# Patient Record
Sex: MALE | Race: BLACK OR AFRICAN AMERICAN | NOT HISPANIC OR LATINO | Employment: UNEMPLOYED | URBAN - METROPOLITAN AREA
[De-identification: names, ages, dates, MRNs, and addresses within clinical notes are randomized per-mention and may not be internally consistent; named-entity substitution may affect disease eponyms.]

---

## 2017-02-23 ENCOUNTER — ANTICOAGULATION MONITORING (OUTPATIENT)
Dept: VASCULAR LAB | Facility: MEDICAL CENTER | Age: 32
End: 2017-02-23

## 2017-02-23 DIAGNOSIS — I27.82 CHRONIC SEPTIC PULMONARY EMBOLISM WITH ACUTE COR PULMONALE (HCC): ICD-10-CM

## 2017-02-23 DIAGNOSIS — I26.01 CHRONIC SEPTIC PULMONARY EMBOLISM WITH ACUTE COR PULMONALE (HCC): ICD-10-CM

## 2017-02-23 NOTE — PROGRESS NOTES
Discharged from [facility].    Pt is managed by the correctional facility.  Consuelo More, PHARMD

## 2018-01-04 ENCOUNTER — APPOINTMENT (OUTPATIENT)
Dept: RADIOLOGY | Facility: MEDICAL CENTER | Age: 33
End: 2018-01-04
Attending: EMERGENCY MEDICINE
Payer: MEDICAID

## 2018-01-04 ENCOUNTER — HOSPITAL ENCOUNTER (EMERGENCY)
Facility: MEDICAL CENTER | Age: 33
End: 2018-01-04
Attending: EMERGENCY MEDICINE
Payer: MEDICAID

## 2018-01-04 VITALS
SYSTOLIC BLOOD PRESSURE: 121 MMHG | DIASTOLIC BLOOD PRESSURE: 92 MMHG | BODY MASS INDEX: 25.18 KG/M2 | HEART RATE: 82 BPM | WEIGHT: 190 LBS | TEMPERATURE: 98.4 F | HEIGHT: 73 IN | OXYGEN SATURATION: 94 % | RESPIRATION RATE: 18 BRPM

## 2018-01-04 DIAGNOSIS — R55 SYNCOPE, UNSPECIFIED SYNCOPE TYPE: ICD-10-CM

## 2018-01-04 DIAGNOSIS — S09.90XA CLOSED HEAD INJURY, INITIAL ENCOUNTER: ICD-10-CM

## 2018-01-04 DIAGNOSIS — F10.920 ALCOHOLIC INTOXICATION WITHOUT COMPLICATION (HCC): ICD-10-CM

## 2018-01-04 DIAGNOSIS — K08.89 PAIN, DENTAL: ICD-10-CM

## 2018-01-04 DIAGNOSIS — S16.1XXA STRAIN OF NECK MUSCLE, INITIAL ENCOUNTER: ICD-10-CM

## 2018-01-04 LAB
ALBUMIN SERPL BCP-MCNC: 4.5 G/DL (ref 3.2–4.9)
ALBUMIN/GLOB SERPL: 1 G/DL
ALP SERPL-CCNC: 71 U/L (ref 30–99)
ALT SERPL-CCNC: 11 U/L (ref 2–50)
ANION GAP SERPL CALC-SCNC: 12 MMOL/L (ref 0–11.9)
APTT PPP: 24.6 SEC (ref 24.7–36)
AST SERPL-CCNC: 29 U/L (ref 12–45)
BASOPHILS # BLD AUTO: 0 % (ref 0–1.8)
BASOPHILS # BLD: 0 K/UL (ref 0–0.12)
BILIRUB SERPL-MCNC: 0.1 MG/DL (ref 0.1–1.5)
BNP SERPL-MCNC: 21 PG/ML (ref 0–100)
BUN SERPL-MCNC: 11 MG/DL (ref 8–22)
CALCIUM SERPL-MCNC: 9.4 MG/DL (ref 8.4–10.2)
CHLORIDE SERPL-SCNC: 104 MMOL/L (ref 96–112)
CO2 SERPL-SCNC: 22 MMOL/L (ref 20–33)
CREAT SERPL-MCNC: 0.7 MG/DL (ref 0.5–1.4)
EKG IMPRESSION: NORMAL
EOSINOPHIL # BLD AUTO: 0 K/UL (ref 0–0.51)
EOSINOPHIL NFR BLD: 0 % (ref 0–6.9)
ERYTHROCYTE [DISTWIDTH] IN BLOOD BY AUTOMATED COUNT: 41 FL (ref 35.9–50)
ETHANOL BLD-MCNC: 0.38 G/DL
GFR SERPL CREATININE-BSD FRML MDRD: >60 ML/MIN/1.73 M 2
GLOBULIN SER CALC-MCNC: 4.5 G/DL (ref 1.9–3.5)
GLUCOSE SERPL-MCNC: 88 MG/DL (ref 65–99)
HCT VFR BLD AUTO: 43.6 % (ref 42–52)
HGB BLD-MCNC: 15 G/DL (ref 14–18)
INR PPP: 0.82 (ref 0.87–1.13)
LYMPHOCYTES # BLD AUTO: 6.1 K/UL (ref 1–4.8)
LYMPHOCYTES NFR BLD: 56 % (ref 22–41)
MANUAL DIFF BLD: NORMAL
MCH RBC QN AUTO: 30.7 PG (ref 27–33)
MCHC RBC AUTO-ENTMCNC: 34.4 G/DL (ref 33.7–35.3)
MCV RBC AUTO: 89.3 FL (ref 81.4–97.8)
MONOCYTES # BLD AUTO: 0.87 K/UL (ref 0–0.85)
MONOCYTES NFR BLD AUTO: 8 % (ref 0–13.4)
NEUTROPHILS # BLD AUTO: 3.92 K/UL (ref 1.82–7.42)
NEUTROPHILS NFR BLD: 34 % (ref 44–72)
NEUTS BAND NFR BLD MANUAL: 2 % (ref 0–10)
NRBC # BLD AUTO: 0 K/UL
NRBC BLD-RTO: 0 /100 WBC
PLATELET # BLD AUTO: 311 K/UL (ref 164–446)
PLATELET BLD QL SMEAR: NORMAL
PMV BLD AUTO: 9.8 FL (ref 9–12.9)
POTASSIUM SERPL-SCNC: 3.8 MMOL/L (ref 3.6–5.5)
PROT SERPL-MCNC: 9 G/DL (ref 6–8.2)
PROTHROMBIN TIME: 10.9 SEC (ref 12–14.6)
RBC # BLD AUTO: 4.88 M/UL (ref 4.7–6.1)
RBC BLD AUTO: NORMAL
SODIUM SERPL-SCNC: 138 MMOL/L (ref 135–145)
TROPONIN I SERPL-MCNC: <0.02 NG/ML (ref 0–0.04)
VARIANT LYMPHS BLD QL SMEAR: NORMAL
WBC # BLD AUTO: 10.9 K/UL (ref 4.8–10.8)

## 2018-01-04 PROCEDURE — 93005 ELECTROCARDIOGRAM TRACING: CPT | Performed by: EMERGENCY MEDICINE

## 2018-01-04 PROCEDURE — 99285 EMERGENCY DEPT VISIT HI MDM: CPT

## 2018-01-04 PROCEDURE — 83880 ASSAY OF NATRIURETIC PEPTIDE: CPT

## 2018-01-04 PROCEDURE — 72125 CT NECK SPINE W/O DYE: CPT

## 2018-01-04 PROCEDURE — 700111 HCHG RX REV CODE 636 W/ 250 OVERRIDE (IP): Performed by: EMERGENCY MEDICINE

## 2018-01-04 PROCEDURE — 71045 X-RAY EXAM CHEST 1 VIEW: CPT

## 2018-01-04 PROCEDURE — 70450 CT HEAD/BRAIN W/O DYE: CPT

## 2018-01-04 PROCEDURE — 96361 HYDRATE IV INFUSION ADD-ON: CPT

## 2018-01-04 PROCEDURE — 700105 HCHG RX REV CODE 258: Performed by: EMERGENCY MEDICINE

## 2018-01-04 PROCEDURE — 80307 DRUG TEST PRSMV CHEM ANLYZR: CPT

## 2018-01-04 PROCEDURE — 80053 COMPREHEN METABOLIC PANEL: CPT

## 2018-01-04 PROCEDURE — 85730 THROMBOPLASTIN TIME PARTIAL: CPT

## 2018-01-04 PROCEDURE — 84484 ASSAY OF TROPONIN QUANT: CPT

## 2018-01-04 PROCEDURE — 96374 THER/PROPH/DIAG INJ IV PUSH: CPT

## 2018-01-04 PROCEDURE — 85007 BL SMEAR W/DIFF WBC COUNT: CPT

## 2018-01-04 PROCEDURE — 85610 PROTHROMBIN TIME: CPT

## 2018-01-04 PROCEDURE — 85027 COMPLETE CBC AUTOMATED: CPT

## 2018-01-04 RX ORDER — SODIUM CHLORIDE 9 MG/ML
2000 INJECTION, SOLUTION INTRAVENOUS ONCE
Status: COMPLETED | OUTPATIENT
Start: 2018-01-04 | End: 2018-01-04

## 2018-01-04 RX ORDER — AMOXICILLIN 500 MG/1
500 CAPSULE ORAL 3 TIMES DAILY
Qty: 30 CAP | Refills: 0 | Status: SHIPPED | OUTPATIENT
Start: 2018-01-04 | End: 2018-01-04

## 2018-01-04 RX ORDER — AMOXICILLIN 500 MG/1
500 CAPSULE ORAL 3 TIMES DAILY
Qty: 21 CAP | Refills: 0 | Status: SHIPPED | OUTPATIENT
Start: 2018-01-04 | End: 2018-01-11

## 2018-01-04 RX ORDER — LORAZEPAM 2 MG/ML
2 INJECTION INTRAMUSCULAR ONCE
Status: COMPLETED | OUTPATIENT
Start: 2018-01-04 | End: 2018-01-04

## 2018-01-04 RX ADMIN — SODIUM CHLORIDE 2000 ML: 900 INJECTION, SOLUTION INTRAVENOUS at 18:30

## 2018-01-04 RX ADMIN — LORAZEPAM 2 MG: 2 INJECTION INTRAMUSCULAR; INTRAVENOUS at 18:30

## 2018-01-04 ASSESSMENT — LIFESTYLE VARIABLES
ON A TYPICAL DAY WHEN YOU DRINK ALCOHOL HOW MANY DRINKS DO YOU HAVE: 2
AVERAGE NUMBER OF DAYS PER WEEK YOU HAVE A DRINK CONTAINING ALCOHOL: 1
TOTAL SCORE: 1
EVER HAD A DRINK FIRST THING IN THE MORNING TO STEADY YOUR NERVES TO GET RID OF A HANGOVER: NO
DO YOU DRINK ALCOHOL: YES
TOTAL SCORE: 1
TOTAL SCORE: 1
HAVE PEOPLE ANNOYED YOU BY CRITICIZING YOUR DRINKING: NO
CONSUMPTION TOTAL: POSITIVE
EVER FELT BAD OR GUILTY ABOUT YOUR DRINKING: YES
HOW MANY TIMES IN THE PAST YEAR HAVE YOU HAD 5 OR MORE DRINKS IN A DAY: 50
HAVE YOU EVER FELT YOU SHOULD CUT DOWN ON YOUR DRINKING: NO

## 2018-01-04 ASSESSMENT — PAIN SCALES - GENERAL: PAINLEVEL_OUTOF10: 0

## 2018-01-05 NOTE — ED NOTES
Back from radiology - patient stating he wanted to leave for a cigarette. Security called. Patient calmed. Patient medicated per ERP orders.

## 2018-01-05 NOTE — ED NOTES
"Brought in via [facility]. Patient states he is anemic. Wife states he has had multiple syncopal episodes in the last several days.  Per EMS Patient has drank 2 pints of alcohol.   Initially patient was hollering \"I don't want to be here\". Security to bedside. Patient calmed. Patient now apologetic and cooperative.   "

## 2018-01-05 NOTE — ED PROVIDER NOTES
ED Provider Note    CHIEF COMPLAINT  Chief Complaint   Patient presents with   • Syncope       HPI  Sagrario Berkowitz is a 32 y.o. male with a history of HIV, oriented embolism who presents with his significant other saying he has had several syncopal episodes over the past several days. The patient is to drinking 2 pints of vodka today. His significant other says he only usually drinks once a week. The patient is quite intoxicated and somewhat belligerent and aggressive. He does complain of having head and neck pain from a fall yesterday. He has had no vomiting or diarrhea, denies any current chest pain, back pain, or abdominal pain.    REVIEW OF SYSTEMS  See HPI for further details. All other systems are negative.     PAST MEDICAL HISTORY  Past Medical History:   Diagnosis Date   • Infectious disease     HIV   • Pulmonary embolism (CMS-HCC)        FAMILY HISTORY  Family History   Problem Relation Age of Onset   • Thyroid Mother    • Hypertension Mother    • Hypertension Father        SOCIAL HISTORY  Social History     Social History   • Marital status: Single     Spouse name: N/A   • Number of children: N/A   • Years of education: N/A     Social History Main Topics   • Smoking status: Current Every Day Smoker     Packs/day: 0.50     Types: Cigarettes   • Smokeless tobacco: Never Used      Comment: ppd   • Alcohol use Yes      Comment: vodka   • Drug use: No      Comment: thc daily   • Sexual activity: Not Currently     Partners: Female     Other Topics Concern   • Not on file     Social History Narrative    ** Merged History Encounter **         ** Merged History Encounter **            SURGICAL HISTORY  No past surgical history on file.    CURRENT MEDICATIONS  Home Medications     Reviewed by Destini Kamara R.N. (Registered Nurse) on 01/04/18 at 1724  Med List Status: Complete   Medication Last Dose Status        Patient Alexander Taking any Medications                       ALLERGIES  No Known Allergies    PHYSICAL  "EXAM  VITAL SIGNS: /100   Pulse (!) 115   Temp 36.7 °C (98.1 °F)   Resp 18   Ht 1.854 m (6' 1\")   Wt 86.2 kg (190 lb)   SpO2 98%   BMI 25.07 kg/m²   Constitutional: Asleep, arousable, appears moderately intoxicated, agitated, swearing, verbally abusive to his significant other, Non-toxic appearance  HENT: Atraumatic. Bilateral external ears normal, mucous membranes dry, lips are dry, dentition is poor, there is a severely fractured 3rd molar to the right upper jaw but only about 20% remaining, there is no significant swelling to the gum or buccal mucosa. No swelling to the soft palate or uvula, no trismus, throat nonerythematous without exudates, nose is normal.  Eyes: PERRL, EOMI, conjunctiva moist, mildly injected.  Neck: Nontender, Normal range of motion, No nuchal rigidity, No stridor.   Lymphatic: No lymphadenopathy noted.   Cardiovascular: Regular rate and rhythm, no murmurs, rubs, gallops.  Thorax & Lungs:  Good breath sounds bilaterally, no wheezes, rales, or retractions.  No chest tenderness.  Abdomen: Bowel sounds normal, Soft, nontender, nondistended, no rebound, guarding, masses.  Back: No CVA or spinal tenderness.  Extremities: Intact distal pulses, No edema, No tenderness.   Skin: Warm, Dry, No rashes.   Musculoskeletal: No joint swelling or tenderness.  Neurologic: Alert & oriented x 3, sensory and motor function normal. No focal deficits.   Psychiatric: Affect intoxicated, verbally aggressive, Judgment inpaired due to intoxication.    EKG  Twelve-lead EKG shows a normal sinus rhythm, rate of D5, normal intervals, normal axis, no acute ST-T wave elevations or ST depressions, there is a early repolarization pattern, no pathologic Q waves, no evidence for an acute injury or ischemia pattern on my reading, in comparison to a previous EKG from May, 2016 there are no acute changes.        RADIOLOGY/PROCEDURES  DX-CHEST-PORTABLE (1 VIEW)   Final Result      No evidence of acute cardiopulmonary " process.      CT-CSPINE WITHOUT PLUS RECONS   Final Result      No evidence of cervical spine fracture.      CT-HEAD W/O   Final Result      No evidence of acute intracranial process.            COURSE & MEDICAL DECISION MAKING  Pertinent Labs & Imaging studies reviewed. (See chart for details)  The patient presents with the above complaints. His significant other says he has been drinking because of a toothache. The patient does admit to drinking 2/5 of vodka today. He also was complaining of a headache and neck pain earlier today, and his significant other said he has fallen down several times since yesterday. The patient was quite agitated and swearing at times. Clinically he appears dehydrated with dry mucous membranes and was given a bolus of normal saline. He was given Ativan for his agitation. EKG shows a normal sinus rhythm. Chest x-ray shows no acute cardiopulmonary abnormalities. CT scan of the head was negative for any acute intracranial findings. CT scan of cervical spine negative for fracture.  CBC shows a white count 10,900, with a rightward shift, hemoglobin normal 15.0, chemistry profile normal, alcohol 0.38, troponin less than 0.02, BNP 21, INR 0.82. I suspect the patient's syncope and falling is secondary to his alcohol use.  I spoke with the patient's significant other, and he has no desire to go to a rehab program. The patient will be placed on a course of amoxicillin for his dental pain, with referral to the [facility] clinic to see a dentist as soon as possible.  His lab work is otherwise unremarkable. When the patient is sober and able to ambulate safely he will be discharged home.    FINAL IMPRESSION  1. Acute alcohol intoxication  2. Syncope likely secondary to alcohol intoxication  3. Dental pain         Electronically signed by: Tunde Andrew, 1/4/2018 6:09 PM

## 2018-01-05 NOTE — DISCHARGE INSTRUCTIONS
"Alcohol Intoxication  Alcohol intoxication occurs when the amount of alcohol that a person has consumed impairs his or her ability to mentally and physically function. Alcohol directly impairs the normal chemical activity of the brain. Drinking large amounts of alcohol can lead to changes in mental function and behavior, and it can cause many physical effects that can be harmful.   Alcohol intoxication can range in severity from mild to very severe. Various factors can affect the level of intoxication that occurs, such as the person's age, gender, weight, frequency of alcohol consumption, and the presence of other medical conditions (such as diabetes, seizures, or heart conditions). Dangerous levels of alcohol intoxication may occur when people drink large amounts of alcohol in a short period (binge drinking). Alcohol can also be especially dangerous when combined with certain prescription medicines or \"recreational\" drugs.  SIGNS AND SYMPTOMS  Some common signs and symptoms of mild alcohol intoxication include:  · Loss of coordination.  · Changes in mood and behavior.  · Impaired judgment.  · Slurred speech.  As alcohol intoxication progresses to more severe levels, other signs and symptoms will appear. These may include:  · Vomiting.  · Confusion and impaired memory.  · Slowed breathing.  · Seizures.  · Loss of consciousness.  DIAGNOSIS   Your health care provider will take a medical history and perform a physical exam. You will be asked about the amount and type of alcohol you have consumed. Blood tests will be done to measure the concentration of alcohol in your blood. In many places, your blood alcohol level must be lower than 80 mg/dL (0.08%) to legally drive. However, many dangerous effects of alcohol can occur at much lower levels.   TREATMENT   People with alcohol intoxication often do not require treatment. Most of the effects of alcohol intoxication are temporary, and they go away as the alcohol naturally " leaves the body. Your health care provider will monitor your condition until you are stable enough to go home. Fluids are sometimes given through an IV access tube to help prevent dehydration.   HOME CARE INSTRUCTIONS  · Do not drive after drinking alcohol.  · Stay hydrated. Drink enough water and fluids to keep your urine clear or pale yellow. Avoid caffeine.    · Only take over-the-counter or prescription medicines as directed by your health care provider.    SEEK MEDICAL CARE IF:   · You have persistent vomiting.    · You do not feel better after a few days.  · You have frequent alcohol intoxication. Your health care provider can help determine if you should see a substance use treatment counselor.  SEEK IMMEDIATE MEDICAL CARE IF:   · You become shaky or tremble when you try to stop drinking.    · You shake uncontrollably (seizure).    · You throw up (vomit) blood. This may be bright red or may look like black coffee grounds.    · You have blood in your stool. This may be bright red or may appear as a black, tarry, bad smelling stool.    · You become lightheaded or faint.    MAKE SURE YOU:   · Understand these instructions.  · Will watch your condition.  · Will get help right away if you are not doing well or get worse.     This information is not intended to replace advice given to you by your health care provider. Make sure you discuss any questions you have with your health care provider.     Document Released: 09/27/2006 Document Revised: 08/20/2014 Document Reviewed: 05/23/2014  [employer] Interactive Patient Education ©2016 [employer]        Cervical Sprain  A cervical sprain is an injury in the neck in which the strong, fibrous tissues (ligaments) that connect your neck bones stretch or tear. Cervical sprains can range from mild to severe. Severe cervical sprains can cause the neck vertebrae to be unstable. This can lead to damage of the spinal cord and can result in serious nervous system problems. The  amount of time it takes for a cervical sprain to get better depends on the cause and extent of the injury. Most cervical sprains heal in 1 to 3 weeks.  CAUSES   Severe cervical sprains may be caused by:   · Contact sport injuries (such as from football, rugby, wrestling, hockey, auto racing, gymnastics, diving, martial arts, or boxing).    · Motor vehicle collisions.    · Whiplash injuries. This is an injury from a sudden forward and backward whipping movement of the head and neck.   · Falls.    Mild cervical sprains may be caused by:   · Being in an awkward position, such as while cradling a telephone between your ear and shoulder.    · Sitting in a chair that does not offer proper support.    · Working at a poorly designed computer station.    · Looking up or down for long periods of time.    SYMPTOMS   · Pain, soreness, stiffness, or a burning sensation in the front, back, or sides of the neck. This discomfort may develop immediately after the injury or slowly, 24 hours or more after the injury.    · Pain or tenderness directly in the middle of the back of the neck.    · Shoulder or upper back pain.    · Limited ability to move the neck.    · Headache.    · Dizziness.    · Weakness, numbness, or tingling in the hands or arms.    · Muscle spasms.    · Difficulty swallowing or chewing.    · Tenderness and swelling of the neck.    DIAGNOSIS   Most of the time your health care provider can diagnose a cervical sprain by taking your history and doing a physical exam. Your health care provider will ask about previous neck injuries and any known neck problems, such as arthritis in the neck. X-rays may be taken to find out if there are any other problems, such as with the bones of the neck. Other tests, such as a CT scan or MRI, may also be needed.   TREATMENT   Treatment depends on the severity of the cervical sprain. Mild sprains can be treated with rest, keeping the neck in place (immobilization), and pain medicines.  Severe cervical sprains are immediately immobilized. Further treatment is done to help with pain, muscle spasms, and other symptoms and may include:  · Medicines, such as pain relievers, numbing medicines, or muscle relaxants.    · Physical therapy. This may involve stretching exercises, strengthening exercises, and posture training. Exercises and improved posture can help stabilize the neck, strengthen muscles, and help stop symptoms from returning.    HOME CARE INSTRUCTIONS   · Put ice on the injured area.    ¨ Put ice in a plastic bag.    ¨ Place a towel between your skin and the bag.    ¨ Leave the ice on for 15-20 minutes, 3-4 times a day.    · If your injury was severe, you may have been given a cervical collar to wear. A cervical collar is a two-piece collar designed to keep your neck from moving while it heals.  ¨ Do not remove the collar unless instructed by your health care provider.  ¨ If you have long hair, keep it outside of the collar.  ¨ Ask your health care provider before making any adjustments to your collar. Minor adjustments may be required over time to improve comfort and reduce pressure on your chin or on the back of your head.  ¨ If you are allowed to remove the collar for cleaning or bathing, follow your health care provider's instructions on how to do so safely.  ¨ Keep your collar clean by wiping it with mild soap and water and drying it completely. If the collar you have been given includes removable pads, remove them every 1-2 days and hand wash them with soap and water. Allow them to air dry. They should be completely dry before you wear them in the collar.  ¨ If you are allowed to remove the collar for cleaning and bathing, wash and dry the skin of your neck. Check your skin for irritation or sores. If you see any, tell your health care provider.  ¨ Do not drive while wearing the collar.    · Only take over-the-counter or prescription medicines for pain, discomfort, or fever as directed  by your health care provider.    · Keep all follow-up appointments as directed by your health care provider.    · Keep all physical therapy appointments as directed by your health care provider.    · Make any needed adjustments to your workstation to promote good posture.    · Avoid positions and activities that make your symptoms worse.    · Warm up and stretch before being active to help prevent problems.    SEEK MEDICAL CARE IF:   · Your pain is not controlled with medicine.    · You are unable to decrease your pain medicine over time as planned.    · Your activity level is not improving as expected.    SEEK IMMEDIATE MEDICAL CARE IF:   · You develop any bleeding.  · You develop stomach upset.  · You have signs of an allergic reaction to your medicine.    · Your symptoms get worse.    · You develop new, unexplained symptoms.    · You have numbness, tingling, weakness, or paralysis in any part of your body.    MAKE SURE YOU:   · Understand these instructions.  · Will watch your condition.  · Will get help right away if you are not doing well or get worse.     This information is not intended to replace advice given to you by your health care provider. Make sure you discuss any questions you have with your health care provider.     Document Released: 10/14/2008 Document Revised: 12/23/2014 Document Reviewed: 06/25/2014  [employer] Interactive Patient Education ©2016 [employer]  Dental Pain  Dental pain may be caused by many things, including:  · Tooth decay (cavities or caries). Cavities expose the nerve of your tooth to air and hot or cold temperatures. This can cause pain or discomfort.  · Abscess or infection. A dental abscess is a collection of infected pus from a bacterial infection in the inner part of the tooth (pulp). It usually occurs at the end of the tooth's root.  · Injury.  · An unknown reason (idiopathic).  Your pain may be mild or severe. It may only occur when:  · You are chewing.  · You are  exposed to hot or cold temperature.  · You are eating or drinking sugary foods or beverages, such as soda or candy.  Your pain may also be constant.  HOME CARE INSTRUCTIONS  Watch your dental pain for any changes. The following actions may help to lessen any discomfort that you are feeling:  · Take medicines only as directed by your dentist.  · If you were prescribed an antibiotic medicine, finish all of it even if you start to feel better.  · Keep all follow-up visits as directed by your dentist. This is important.  · Do not apply heat to the outside of your face.  · Rinse your mouth or gargle with salt water if directed by your dentist. This helps with pain and swelling.  ¨ You can make salt water by adding ¼ tsp of salt to 1 cup of warm water.  · Apply ice to the painful area of your face:  ¨ Put ice in a plastic bag.  ¨ Place a towel between your skin and the bag.  ¨ Leave the ice on for 20 minutes, 2-3 times per day.  · Avoid foods or drinks that cause you pain, such as:  ¨ Very hot or very cold foods or drinks.  ¨ Sweet or sugary foods or drinks.  SEEK MEDICAL CARE IF:  · Your pain is not controlled with medicines.  · Your symptoms are worse.  · You have new symptoms.  SEEK IMMEDIATE MEDICAL CARE IF:  · You are unable to open your mouth.  · You are having trouble breathing or swallowing.  · You have a fever.  · Your face, neck, or jaw is swollen.     This information is not intended to replace advice given to you by your health care provider. Make sure you discuss any questions you have with your health care provider.     Document Released: 12/18/2006 Document Revised: 05/03/2016 Document Reviewed: 12/14/2015  [employer] Interactive Patient Education ©2016 [employer]

## 2018-01-05 NOTE — ED NOTES
Patient educated to stay in bed.   Lab called to check on why blood drawn on arrival is not in process.

## 2018-01-05 NOTE — ED NOTES
Discharge instructions provided.  Pt verbalized the understanding of discharge instructions to follow up with Dentist, PCP and to return to ER if condition worsens.  Pt ambulated out of ER without difficulty with significant other.

## 2018-02-23 ENCOUNTER — HOSPITAL ENCOUNTER (EMERGENCY)
Facility: MEDICAL CENTER | Age: 33
End: 2018-02-23
Attending: EMERGENCY MEDICINE
Payer: MEDICAID

## 2018-02-23 VITALS
RESPIRATION RATE: 16 BRPM | HEART RATE: 95 BPM | SYSTOLIC BLOOD PRESSURE: 139 MMHG | WEIGHT: 175 LBS | HEIGHT: 74 IN | BODY MASS INDEX: 22.46 KG/M2 | DIASTOLIC BLOOD PRESSURE: 97 MMHG | TEMPERATURE: 98.4 F

## 2018-02-23 PROCEDURE — 302449 STATCHG TRIAGE ONLY (STATISTIC)

## 2018-02-23 ASSESSMENT — LIFESTYLE VARIABLES: DO YOU DRINK ALCOHOL: NO

## 2018-02-24 NOTE — ED NOTES
PT UNABLE TO FOCUS ON ANY COMPLAINT, RAMBLING ABOUT HIS FAMILY AND US RESPECTING HIM, PT REFUSING TO STAY, SAYING WE'RE NOT RESPECTING HIM.  AMB WITH STEADY GAIT OUT TO WR WITH SECURITY OBSERVATION.  LEFT BEFORE MD SEYMOUR.

## 2018-02-24 NOTE — ED PROVIDER NOTES
"ED Provider Note    Scribed for Guido Browning M.D. by Waqar Silva. 2/23/2018, 6:51 PM.    Primary care provider: None  Means of arrival: walk-in    CHIEF COMPLAINT  Chief Complaint   Patient presents with   • Syncope     PT BROUGHT IN BY EMS, JOVANNY, C/O \"PASSING OUT 7 TIMES TODAY\", AND THAT HIS TOOTH HURTS, APPEARS EITHER MANIC OR INTOXICATED       HPI    Patient eloped from the ED and was arrested prior to my evaluation.     "

## 2019-12-25 ENCOUNTER — HOSPITAL ENCOUNTER (EMERGENCY)
Facility: MEDICAL CENTER | Age: 34
End: 2019-12-25
Attending: EMERGENCY MEDICINE
Payer: MEDICAID

## 2019-12-25 VITALS
SYSTOLIC BLOOD PRESSURE: 139 MMHG | WEIGHT: 182.1 LBS | RESPIRATION RATE: 16 BRPM | DIASTOLIC BLOOD PRESSURE: 103 MMHG | BODY MASS INDEX: 24.66 KG/M2 | OXYGEN SATURATION: 98 % | HEIGHT: 72 IN | HEART RATE: 99 BPM | TEMPERATURE: 98.1 F

## 2019-12-25 DIAGNOSIS — H10.33 ACUTE CONJUNCTIVITIS OF BOTH EYES, UNSPECIFIED ACUTE CONJUNCTIVITIS TYPE: ICD-10-CM

## 2019-12-25 LAB
GRAM STN SPEC: NORMAL
SIGNIFICANT IND 70042: NORMAL
SITE SITE: NORMAL
SOURCE SOURCE: NORMAL
SPECIMEN SOURCE: NORMAL

## 2019-12-25 PROCEDURE — 87186 SC STD MICRODIL/AGAR DIL: CPT

## 2019-12-25 PROCEDURE — 87077 CULTURE AEROBIC IDENTIFY: CPT

## 2019-12-25 PROCEDURE — 87491 CHLMYD TRACH DNA AMP PROBE: CPT

## 2019-12-25 PROCEDURE — 87070 CULTURE OTHR SPECIMN AEROBIC: CPT

## 2019-12-25 PROCEDURE — 99283 EMERGENCY DEPT VISIT LOW MDM: CPT

## 2019-12-25 PROCEDURE — 87591 N.GONORRHOEAE DNA AMP PROB: CPT

## 2019-12-25 PROCEDURE — 87205 SMEAR GRAM STAIN: CPT

## 2019-12-25 RX ORDER — TOBRAMYCIN 3 MG/ML
1 SOLUTION/ DROPS OPHTHALMIC EVERY 4 HOURS
Qty: 1 BOTTLE | Refills: 0 | Status: ON HOLD | OUTPATIENT
Start: 2019-12-25 | End: 2019-12-30

## 2019-12-25 NOTE — LETTER
12/28/2019               Sagrario Berkowitz  [address]  [facility] [address]        Dear Sagrario (MR#[id number])    This letter is sent in regards to your, recent visit to the [facility] Emergency Department on 12/25/2019.  During the visit, tests were performed to assist the physician in a medical diagnosis.  A review of those tests requires that we notify you of the following:    Your wound culture was POSITIVE for a bacteria called Methicillin Sensitive Staphylococcus aureus and Neisseria gonorrhoeae. The antibiotic prescribed for you (tobramycin eye drops) should be active to treat methicillin sensitive Staphlococcus aureus, but not Neisseria gonorrhoeae IT IS IMPORTANT THAT YOU CONTINUE ADMINISTERING THE EYE DROPS UNTIL IT IS FINISHED, AND RETURN TO THE ED, OR FOLLOW UP WITH YOUR PRIMARY CARE PROVIDER OR [facility] DEPARTMENT ([phone]) TO RECEIVE TREATMENT FOR GONORRHEA. It is recommended you avoid sexual activity for 7 days after treatment, notify partners within the past 60 days to seek testing, and obtain testing for additional sexually transmitted diseases from the [facility].       Please feel free to contact me at the number below if you have any questions or concerns. Thank you for your cooperation in the matter.    Sincerely,  ED Culture Follow-Up Staff  Kelli Christy, PharmD    [facility], Emergency Department  [address], [place] [id number] [phone] (ED Culture Line)  [phone]

## 2019-12-26 NOTE — DISCHARGE INSTRUCTIONS
"Conjunctivitis  Conjunctivitis is commonly called \"pink eye.\" Conjunctivitis can be caused by bacterial or viral infection, allergies, or injuries. There is usually redness of the lining of the eye, itching, discomfort, and sometimes discharge. There may be deposits of matter along the eyelids. A viral infection usually causes a watery discharge, while a bacterial infection causes a yellowish, thick discharge. Pink eye is very contagious and spreads by direct contact.  You may be given antibiotic eyedrops as part of your treatment. Before using your eye medicine, remove all drainage from the eye by washing gently with warm water and cotton balls. Continue to use the medication until you have awakened 2 mornings in a row without discharge from the eye. Do not rub your eye. This increases the irritation and helps spread infection. Use separate towels from other household members. Wash your hands with soap and water before and after touching your eyes. Use cold compresses to reduce pain and sunglasses to relieve irritation from light. Do not wear contact lenses or wear eye makeup until the infection is gone.  SEEK MEDICAL CARE IF:   · Your symptoms are not better after 3 days of treatment.  · You have increased pain or trouble seeing.  · The outer eyelids become very red or swollen.  Document Released: 01/25/2006 Document Revised: 03/11/2013 Document Reviewed: 12/18/2006  [employer]® Patient Information ©2014 [employer].    "

## 2019-12-26 NOTE — ED PROVIDER NOTES
ED Provider Note    CHIEF COMPLAINT  Chief Complaint   Patient presents with   • Eye Drainage     LT eye since yesterday. large amount of discharge, periorbital swelling observed.        HPI  Sagrario Berkowitz is a 34 y.o. male with a history of HIV currently noncompliant with medications, who presents with eye drainage from both eyes left greater than right.  He reports that the symptoms started yesterday but got worse today.  The patient denies fever, he denies cough.  He denies any other infectious symptoms.    REVIEW OF SYSTEMS  See HPI for further details. All other systems are negative.     PAST MEDICAL HISTORY   has a past medical history of Infectious disease and Pulmonary embolism (HCC).    SOCIAL HISTORY  Social History     Tobacco Use   • Smoking status: Current Every Day Smoker     Packs/day: 0.50     Types: Cigarettes   • Smokeless tobacco: Never Used   • Tobacco comment: ppd   Substance and Sexual Activity   • Alcohol use: Yes     Comment: vodka    • Drug use: No     Types: Inhaled     Comment: thc daily   • Sexual activity: Not Currently     Partners: Female       SURGICAL HISTORY  patient denies any surgical history    CURRENT MEDICATIONS  Home Medications     Reviewed by Carson Flores R.N. (Registered Nurse) on 12/25/19 at 1654  Med List Status: Partial   Medication Last Dose Status        Patient Alexander Taking any Medications                       ALLERGIES  No Known Allergies    PHYSICAL EXAM  VITAL SIGNS: /103   Pulse 99   Temp 36.7 °C (98.1 °F) (Temporal)   Resp 16   Ht 1.829 m (6')   Wt 82.6 kg (182 lb 1.6 oz)   SpO2 98%   BMI 24.70 kg/m²  @DIONTE[[id number]::@   Pulse ox interpretation: I interpret this pulse ox as normal.  Constitutional: Alert.  HENT: No signs of trauma, Bilateral external ears normal, Nose normal.   Eyes: Pupils are equal and reactive, the patient has erythematous conjunctiva bilaterally, he has pus with crusting of the left eye, he has chemosis of the left eye.  The  right eye has the same symptoms to a lesser degree.  Neck: Normal range of motion, No tenderness, Supple, No stridor.   Lymphatic: No lymphadenopathy noted.   Cardiovascular: Regular rate and rhythm, no murmurs.   Thorax & Lungs: Normal breath sounds, No respiratory distress, No wheezing, No chest tenderness.   Abdomen: Bowel sounds normal, Soft, No tenderness, No masses, No pulsatile masses. No peritoneal signs.  Skin: Warm, Dry, No erythema, No rash.   Back: No bony tenderness, No CVA tenderness.   Extremities: Intact distal pulses, No edema, No tenderness, No cyanosis.  Musculoskeletal: Good range of motion in all major joints. No tenderness to palpation or major deformities noted.   Neurologic: Alert , Normal motor function, Normal sensory function, No focal deficits noted.   Psychiatric: Affect normal, Judgment normal, Mood normal.       DIAGNOSTIC STUDIES / PROCEDURES      LABS  Labs Reviewed   CHLAMYDIA/GC PCR URINE OR SWAB    Narrative:     Left eye conjuctivitis   CULTURE WOUND W/ GRAM STAIN             COURSE & MEDICAL DECISION MAKING  Pertinent Labs & Imaging studies reviewed. (See chart for details)    The patient had visual acuity, 20/30 left, 20/20 right, 20/20 both.    The pus from the eye was cultured for general culture as well as gonorrhea and chlamydia.  The patient is treated with tobramycin eyedrops.  He will return to the ER if no better in 1 to 2 days or at any time if worse.  He is asked to follow-up with the [facility] clinic to have his viral load tested and to establish a primary care doctor.  He is asked to return for any cough or any signs or symptoms of infection.    The patient will return for new or worsening symptoms and is stable at the time of discharge.    The patient is referred to a primary physician for blood pressure management, diabetic screening, and for all other preventative health concerns.        DISPOSITION:  Patient will be discharged home in stable condition.    FOLLOW  UP:  [facility], Emergency Dept  [address] [address]  [phone]    If symptoms worsen    [facility][1] [place]  [address] [address]  [phone]    Call for appointment to have your viral load tested and to establish a primary care doctor      OUTPATIENT MEDICATIONS:  New Prescriptions    TOBRAMYCIN (TOBREX) 0.3 % SOLUTION OPHTHALMIC SOLUTION    Place 1 Drop in both eyes every 4 hours for 7 days.       The patient will return for worsening symptoms and is stable at the time of discharge. The patient verbalizes understanding and will comply.    FINAL IMPRESSION  1. Acute conjunctivitis of both eyes, unspecified acute conjunctivitis type                Electronically signed by: Gordon Romano, 12/25/2019 5:37 PM

## 2019-12-26 NOTE — ED TRIAGE NOTES
Chief Complaint   Patient presents with   • Eye Drainage     LT eye since yesterday. large amount of discharge, periorbital swelling observed.      Pt to triage for above. Vision blurred from drainage.     /103   Pulse 99   Temp 36.7 °C (98.1 °F) (Temporal)   Resp 16   Ht 1.829 m (6')   Wt 82.6 kg (182 lb 1.6 oz)   SpO2 98%   BMI 24.70 kg/m²

## 2019-12-27 LAB — TEST NAME 95000: ABNORMAL

## 2019-12-28 LAB
BACTERIA WND AEROBE CULT: ABNORMAL
BACTERIA WND AEROBE CULT: ABNORMAL
GRAM STN SPEC: ABNORMAL
SIGNIFICANT IND 70042: ABNORMAL
SITE SITE: ABNORMAL
SOURCE SOURCE: ABNORMAL

## 2019-12-28 NOTE — ED NOTES
ED Positive Culture Follow-up/Notification Note:    Date: 12/28/2019     Patient seen in the ED on 12/25/2019 for L eye drainage w/periorbital swelling. H/o HIV noncompliant with medication. Bilateral erythematous conjunctiva and pus with crusting and chemosis of left eye, less in R eye.  1. Acute conjunctivitis of both eyes, unspecified acute conjunctivitis type       Discharge Medication List as of 12/25/2019  5:50 PM      START taking these medications    Details   tobramycin (TOBREX) 0.3 % Solution ophthalmic solution Place 1 Drop in both eyes every 4 hours for 7 days., Disp-1 Bottle, R-0, Print Rx Paper             Allergies: Patient has no known allergies.     Vitals:    12/25/19 1651 12/25/19 1652   BP: 139/103    Pulse: 99    Resp: 16    Temp: 36.7 °C (98.1 °F)    TempSrc: Temporal    SpO2: 98%    Weight:  82.6 kg (182 lb 1.6 oz)   Height: 1.829 m (6')        Final cultures:   Results     Procedure Component Value Units Date/Time    CULTURE WOUND W/ GRAM STAIN [[id number]]  (Abnormal)  (Susceptibility) Collected:  12/25/19 2020    Order Status:  Completed Specimen:  Wound from Head Updated:  12/28/19 0832     Significant Indicator POS     Source WND     Site Left Eye     Culture Result Growth noted after further incubation, see below for  organism identification.       Gram Stain Result Few WBCs.  No organisms seen.       Culture Result Staphylococcus aureus  Rare growth      Narrative:       Left eye conjuctivitis  Left eye conjuctivitis    Susceptibility     Staphylococcus aureus (1)     Antibiotic Interpretation Microscan Method Status    Azithromycin Sensitive <=2 mcg/mL MARGARETH Final    Clindamycin Sensitive <=0.5 mcg/mL MARGARETH Final    Cefazolin Sensitive <=8 mcg/mL MARGARETH Final    Ceftaroline Sensitive <=0.5 mcg/mL MARGARETH Final    Daptomycin Sensitive <=1 mcg/mL MARGARETH Final    Ampicillin/sulbactam Sensitive <=8/4 mcg/mL MARGARETH Final    Erythromycin Sensitive 0.5 mcg/mL MARGARETH Final    Vancomycin Sensitive 1 mcg/mL MARGARETH  Final    Oxacillin Sensitive <=0.25 mcg/mL MARGARETH Final    Pip/Tazobactam Sensitive <=4 mcg/mL MARGARETH Final    Trimeth/Sulfa Sensitive <=0.5/9.5 mcg/mL MARGARETH Final    Tetracycline Sensitive <=4 mcg/mL MARGARETH Final                   GRAM STAIN [[id number]] Collected:  12/25/19 1730    Order Status:  Completed Specimen:  Wound Updated:  12/25/19 2100     Significant Indicator .     Source WND     Site Left Eye     Gram Stain Result Few WBCs.  No organisms seen.      Narrative:       Left eye conjuctivitis  Left eye conjuctivitis    Chlamydia/GC PCR Urine Or Swab [[id number]] Collected:  12/25/19 1730    Order Status:  Completed Specimen:  Genital Updated:  12/25/19 1741     Source Other    Narrative:       Left eye conjuctivitis        Collected: 12/25/19 1747   Comment: Test name                     Result Flag Units  RefIntvl   -------------------------------------------------------------   APTIMA Media Type        Unisex Swab   Specimen Source                  Eye   C. trachomatis by TMA       Negative             Negative   N. gonorrhoeae by TMA       Positive A           Negative      Plan:   Per tobramycin eye drop package insert, is active against susceptible S aureus. Although micro lab didn't have tobramycin susceptibilities, did report gentamicin sensitive. Isolated MSSA likely sensitive to prescribed therapy.     Unable to reach by phone, sent letter to notify of results, recommend returning to ED, or follow up with PCP or [facility] Department to receive treatment for gonococcal conjunctivitis (ceftriaxone 1g IM + azithromycin 1 g PO), abstain from sexual activity for 7 days after treatment, notifying partners within the past 60 days to receive treatment, and obtaining additional STD screening.     Kelli Christy

## 2019-12-29 ENCOUNTER — APPOINTMENT (OUTPATIENT)
Dept: RADIOLOGY | Facility: MEDICAL CENTER | Age: 34
DRG: 603 | End: 2019-12-29
Attending: EMERGENCY MEDICINE
Payer: MEDICAID

## 2019-12-29 ENCOUNTER — HOSPITAL ENCOUNTER (INPATIENT)
Facility: MEDICAL CENTER | Age: 34
LOS: 1 days | DRG: 603 | End: 2019-12-30
Attending: EMERGENCY MEDICINE | Admitting: HOSPITALIST
Payer: MEDICAID

## 2019-12-29 DIAGNOSIS — D84.9 IMMUNOCOMPROMISED STATE (HCC): ICD-10-CM

## 2019-12-29 DIAGNOSIS — L03.213 PERIORBITAL CELLULITIS OF LEFT EYE: ICD-10-CM

## 2019-12-29 DIAGNOSIS — H10.32 ACUTE BACTERIAL CONJUNCTIVITIS OF LEFT EYE: ICD-10-CM

## 2019-12-29 PROBLEM — A54.9 GONORRHEA: Status: ACTIVE | Noted: 2019-12-29

## 2019-12-29 PROBLEM — H16.9: Status: ACTIVE | Noted: 2019-12-29

## 2019-12-29 LAB
ALBUMIN SERPL BCP-MCNC: 3.8 G/DL (ref 3.2–4.9)
ALBUMIN/GLOB SERPL: 0.9 G/DL
ALP SERPL-CCNC: 93 U/L (ref 30–99)
ALT SERPL-CCNC: 8 U/L (ref 2–50)
ANION GAP SERPL CALC-SCNC: 10 MMOL/L (ref 0–11.9)
AST SERPL-CCNC: 22 U/L (ref 12–45)
BASOPHILS # BLD AUTO: 0.2 % (ref 0–1.8)
BASOPHILS # BLD: 0.01 K/UL (ref 0–0.12)
BILIRUB SERPL-MCNC: 0.2 MG/DL (ref 0.1–1.5)
BUN SERPL-MCNC: 11 MG/DL (ref 8–22)
CALCIUM SERPL-MCNC: 9.4 MG/DL (ref 8.5–10.5)
CHLORIDE SERPL-SCNC: 103 MMOL/L (ref 96–112)
CO2 SERPL-SCNC: 24 MMOL/L (ref 20–33)
CREAT SERPL-MCNC: 0.77 MG/DL (ref 0.5–1.4)
EOSINOPHIL # BLD AUTO: 0.02 K/UL (ref 0–0.51)
EOSINOPHIL NFR BLD: 0.3 % (ref 0–6.9)
ERYTHROCYTE [DISTWIDTH] IN BLOOD BY AUTOMATED COUNT: 39.7 FL (ref 35.9–50)
GLOBULIN SER CALC-MCNC: 4.3 G/DL (ref 1.9–3.5)
GLUCOSE SERPL-MCNC: 84 MG/DL (ref 65–99)
HCT VFR BLD AUTO: 42.1 % (ref 42–52)
HGB BLD-MCNC: 14.2 G/DL (ref 14–18)
IMM GRANULOCYTES # BLD AUTO: 0.01 K/UL (ref 0–0.11)
IMM GRANULOCYTES NFR BLD AUTO: 0.2 % (ref 0–0.9)
LYMPHOCYTES # BLD AUTO: 1.85 K/UL (ref 1–4.8)
LYMPHOCYTES NFR BLD: 29 % (ref 22–41)
MAGNESIUM SERPL-MCNC: 2 MG/DL (ref 1.5–2.5)
MCH RBC QN AUTO: 30.7 PG (ref 27–33)
MCHC RBC AUTO-ENTMCNC: 33.7 G/DL (ref 33.7–35.3)
MCV RBC AUTO: 90.9 FL (ref 81.4–97.8)
MONOCYTES # BLD AUTO: 0.5 K/UL (ref 0–0.85)
MONOCYTES NFR BLD AUTO: 7.8 % (ref 0–13.4)
NEUTROPHILS # BLD AUTO: 3.99 K/UL (ref 1.82–7.42)
NEUTROPHILS NFR BLD: 62.5 % (ref 44–72)
NRBC # BLD AUTO: 0 K/UL
NRBC BLD-RTO: 0 /100 WBC
PLATELET # BLD AUTO: 213 K/UL (ref 164–446)
PMV BLD AUTO: 9.6 FL (ref 9–12.9)
POTASSIUM SERPL-SCNC: 4.1 MMOL/L (ref 3.6–5.5)
PROT SERPL-MCNC: 8.1 G/DL (ref 6–8.2)
RBC # BLD AUTO: 4.63 M/UL (ref 4.7–6.1)
SODIUM SERPL-SCNC: 137 MMOL/L (ref 135–145)
WBC # BLD AUTO: 6.4 K/UL (ref 4.8–10.8)

## 2019-12-29 PROCEDURE — 99223 1ST HOSP IP/OBS HIGH 75: CPT | Performed by: HOSPITALIST

## 2019-12-29 PROCEDURE — A9270 NON-COVERED ITEM OR SERVICE: HCPCS | Performed by: HOSPITALIST

## 2019-12-29 PROCEDURE — 700101 HCHG RX REV CODE 250: Performed by: HOSPITALIST

## 2019-12-29 PROCEDURE — 770006 HCHG ROOM/CARE - MED/SURG/GYN SEMI*

## 2019-12-29 PROCEDURE — 83735 ASSAY OF MAGNESIUM: CPT

## 2019-12-29 PROCEDURE — 96365 THER/PROPH/DIAG IV INF INIT: CPT

## 2019-12-29 PROCEDURE — 87040 BLOOD CULTURE FOR BACTERIA: CPT | Mod: 91

## 2019-12-29 PROCEDURE — 700102 HCHG RX REV CODE 250 W/ 637 OVERRIDE(OP): Performed by: HOSPITALIST

## 2019-12-29 PROCEDURE — 99285 EMERGENCY DEPT VISIT HI MDM: CPT

## 2019-12-29 PROCEDURE — 85025 COMPLETE CBC W/AUTO DIFF WBC: CPT

## 2019-12-29 PROCEDURE — 96367 TX/PROPH/DG ADDL SEQ IV INF: CPT

## 2019-12-29 PROCEDURE — 80053 COMPREHEN METABOLIC PANEL: CPT

## 2019-12-29 PROCEDURE — 700105 HCHG RX REV CODE 258: Performed by: HOSPITALIST

## 2019-12-29 PROCEDURE — 87205 SMEAR GRAM STAIN: CPT

## 2019-12-29 PROCEDURE — 700105 HCHG RX REV CODE 258: Performed by: EMERGENCY MEDICINE

## 2019-12-29 PROCEDURE — 700111 HCHG RX REV CODE 636 W/ 250 OVERRIDE (IP): Performed by: EMERGENCY MEDICINE

## 2019-12-29 PROCEDURE — 96366 THER/PROPH/DIAG IV INF ADDON: CPT

## 2019-12-29 PROCEDURE — 700111 HCHG RX REV CODE 636 W/ 250 OVERRIDE (IP): Performed by: HOSPITALIST

## 2019-12-29 PROCEDURE — 70481 CT ORBIT/EAR/FOSSA W/DYE: CPT

## 2019-12-29 PROCEDURE — 87070 CULTURE OTHR SPECIMN AEROBIC: CPT

## 2019-12-29 PROCEDURE — 700117 HCHG RX CONTRAST REV CODE 255: Performed by: EMERGENCY MEDICINE

## 2019-12-29 RX ORDER — PROMETHAZINE HYDROCHLORIDE 25 MG/1
12.5-25 TABLET ORAL EVERY 4 HOURS PRN
Status: DISCONTINUED | OUTPATIENT
Start: 2019-12-29 | End: 2019-12-31 | Stop reason: HOSPADM

## 2019-12-29 RX ORDER — ACETAMINOPHEN 325 MG/1
650 TABLET ORAL EVERY 6 HOURS PRN
Status: DISCONTINUED | OUTPATIENT
Start: 2019-12-29 | End: 2019-12-31 | Stop reason: HOSPADM

## 2019-12-29 RX ORDER — OXYCODONE HYDROCHLORIDE 5 MG/1
5 TABLET ORAL EVERY 4 HOURS PRN
Status: DISCONTINUED | OUTPATIENT
Start: 2019-12-29 | End: 2019-12-31 | Stop reason: HOSPADM

## 2019-12-29 RX ORDER — ONDANSETRON 4 MG/1
4 TABLET, ORALLY DISINTEGRATING ORAL EVERY 4 HOURS PRN
Status: DISCONTINUED | OUTPATIENT
Start: 2019-12-29 | End: 2019-12-31 | Stop reason: HOSPADM

## 2019-12-29 RX ORDER — PROCHLORPERAZINE EDISYLATE 5 MG/ML
5-10 INJECTION INTRAMUSCULAR; INTRAVENOUS EVERY 4 HOURS PRN
Status: DISCONTINUED | OUTPATIENT
Start: 2019-12-29 | End: 2019-12-31 | Stop reason: HOSPADM

## 2019-12-29 RX ORDER — PROMETHAZINE HYDROCHLORIDE 12.5 MG/1
12.5-25 SUPPOSITORY RECTAL EVERY 4 HOURS PRN
Status: DISCONTINUED | OUTPATIENT
Start: 2019-12-29 | End: 2019-12-31 | Stop reason: HOSPADM

## 2019-12-29 RX ORDER — TOBRAMYCIN 3 MG/ML
1 SOLUTION/ DROPS OPHTHALMIC EVERY 4 HOURS
Status: DISCONTINUED | OUTPATIENT
Start: 2019-12-29 | End: 2019-12-29

## 2019-12-29 RX ORDER — ERYTHROMYCIN 5 MG/G
OINTMENT OPHTHALMIC EVERY 4 HOURS
Status: DISCONTINUED | OUTPATIENT
Start: 2019-12-29 | End: 2019-12-31 | Stop reason: HOSPADM

## 2019-12-29 RX ORDER — BISACODYL 10 MG
10 SUPPOSITORY, RECTAL RECTAL
Status: DISCONTINUED | OUTPATIENT
Start: 2019-12-29 | End: 2019-12-31 | Stop reason: HOSPADM

## 2019-12-29 RX ORDER — AMOXICILLIN 250 MG
2 CAPSULE ORAL 2 TIMES DAILY
Status: DISCONTINUED | OUTPATIENT
Start: 2019-12-29 | End: 2019-12-31 | Stop reason: HOSPADM

## 2019-12-29 RX ORDER — ONDANSETRON 2 MG/ML
4 INJECTION INTRAMUSCULAR; INTRAVENOUS EVERY 4 HOURS PRN
Status: DISCONTINUED | OUTPATIENT
Start: 2019-12-29 | End: 2019-12-31 | Stop reason: HOSPADM

## 2019-12-29 RX ORDER — IBUPROFEN 800 MG/1
400 TABLET ORAL EVERY 6 HOURS PRN
Status: DISCONTINUED | OUTPATIENT
Start: 2019-12-29 | End: 2019-12-31 | Stop reason: HOSPADM

## 2019-12-29 RX ORDER — POLYETHYLENE GLYCOL 3350 17 G/17G
1 POWDER, FOR SOLUTION ORAL
Status: DISCONTINUED | OUTPATIENT
Start: 2019-12-29 | End: 2019-12-31 | Stop reason: HOSPADM

## 2019-12-29 RX ORDER — CEFAZOLIN SODIUM 2 G/100ML
2 INJECTION, SOLUTION INTRAVENOUS EVERY 8 HOURS
Status: DISCONTINUED | OUTPATIENT
Start: 2019-12-29 | End: 2019-12-29

## 2019-12-29 RX ORDER — AZITHROMYCIN 250 MG/1
1000 TABLET, FILM COATED ORAL ONCE
Status: COMPLETED | OUTPATIENT
Start: 2019-12-29 | End: 2019-12-29

## 2019-12-29 RX ADMIN — IOHEXOL 80 ML: 350 INJECTION, SOLUTION INTRAVENOUS at 16:01

## 2019-12-29 RX ADMIN — CEFTRIAXONE SODIUM 2 G: 2 INJECTION, POWDER, FOR SOLUTION INTRAMUSCULAR; INTRAVENOUS at 20:58

## 2019-12-29 RX ADMIN — ERYTHROMYCIN: 5 OINTMENT OPHTHALMIC at 20:59

## 2019-12-29 RX ADMIN — AZITHROMYCIN 1000 MG: 250 TABLET, FILM COATED ORAL at 20:32

## 2019-12-29 RX ADMIN — VANCOMYCIN HYDROCHLORIDE 2100 MG: 500 INJECTION, POWDER, LYOPHILIZED, FOR SOLUTION INTRAVENOUS at 16:15

## 2019-12-29 ASSESSMENT — COGNITIVE AND FUNCTIONAL STATUS - GENERAL
SUGGESTED CMS G CODE MODIFIER MOBILITY: CH
SUGGESTED CMS G CODE MODIFIER DAILY ACTIVITY: CH
MOBILITY SCORE: 24
DAILY ACTIVITIY SCORE: 24

## 2019-12-29 ASSESSMENT — LIFESTYLE VARIABLES
TOTAL SCORE: 4
PAROXYSMAL SWEATS: NO SWEAT VISIBLE
ALCOHOL_USE: YES
TOTAL SCORE: 0
VISUAL DISTURBANCES: NOT PRESENT
EVER HAD A DRINK FIRST THING IN THE MORNING TO STEADY YOUR NERVES TO GET RID OF A HANGOVER: YES
NAUSEA AND VOMITING: NO NAUSEA AND NO VOMITING
AGITATION: NORMAL ACTIVITY
HAVE PEOPLE ANNOYED YOU BY CRITICIZING YOUR DRINKING: YES
TOTAL SCORE: 4
EVER FELT BAD OR GUILTY ABOUT YOUR DRINKING: YES
ANXIETY: NO ANXIETY (AT EASE)
DO YOU DRINK ALCOHOL: YES
DOES PATIENT WANT TO STOP DRINKING: NO
HAVE YOU EVER FELT YOU SHOULD CUT DOWN ON YOUR DRINKING: YES
CONSUMPTION TOTAL: POSITIVE
AUDITORY DISTURBANCES: NOT PRESENT
SUBSTANCE_ABUSE: 0
EVER_SMOKED: YES
AVERAGE NUMBER OF DAYS PER WEEK YOU HAVE A DRINK CONTAINING ALCOHOL: 4
TOTAL SCORE: 4
ON A TYPICAL DAY WHEN YOU DRINK ALCOHOL HOW MANY DRINKS DO YOU HAVE: 5
HOW MANY TIMES IN THE PAST YEAR HAVE YOU HAD 5 OR MORE DRINKS IN A DAY: 208
TREMOR: NO TREMOR
ORIENTATION AND CLOUDING OF SENSORIUM: ORIENTED AND CAN DO SERIAL ADDITIONS
HEADACHE, FULLNESS IN HEAD: NOT PRESENT

## 2019-12-29 ASSESSMENT — ENCOUNTER SYMPTOMS
FALLS: 0
SINUS PAIN: 0
SORE THROAT: 0
EYE REDNESS: 1
HEARTBURN: 0
WHEEZING: 0
CLAUDICATION: 0
ORTHOPNEA: 0
DOUBLE VISION: 0
DIARRHEA: 0
FEVER: 0
ABDOMINAL PAIN: 0
FLANK PAIN: 0
VOMITING: 0
CHILLS: 0
WEAKNESS: 0
EYE DISCHARGE: 1
NECK PAIN: 0
BLOOD IN STOOL: 0
DEPRESSION: 0
BRUISES/BLEEDS EASILY: 0
PND: 0
HEADACHES: 0
CONSTIPATION: 0
MYALGIAS: 0
BACK PAIN: 0
DIZZINESS: 0
SHORTNESS OF BREATH: 0
TREMORS: 0
HEMOPTYSIS: 0
PHOTOPHOBIA: 1
SPUTUM PRODUCTION: 0
BLURRED VISION: 1
PALPITATIONS: 0
DIAPHORESIS: 0
EYE PAIN: 1
HALLUCINATIONS: 0
STRIDOR: 0
COUGH: 0
POLYDIPSIA: 0
TINGLING: 0
NAUSEA: 0

## 2019-12-29 ASSESSMENT — COPD QUESTIONNAIRES
DO YOU EVER COUGH UP ANY MUCUS OR PHLEGM?: NO/ONLY WITH OCCASIONAL COLDS OR INFECTIONS
IN THE PAST 12 MONTHS DO YOU DO LESS THAN YOU USED TO BECAUSE OF YOUR BREATHING PROBLEMS: DISAGREE/UNSURE
DURING THE PAST 4 WEEKS HOW MUCH DID YOU FEEL SHORT OF BREATH: NONE/LITTLE OF THE TIME
HAVE YOU SMOKED AT LEAST 100 CIGARETTES IN YOUR ENTIRE LIFE: NO/DON'T KNOW

## 2019-12-29 ASSESSMENT — PATIENT HEALTH QUESTIONNAIRE - PHQ9
SUM OF ALL RESPONSES TO PHQ9 QUESTIONS 1 AND 2: 0
1. LITTLE INTEREST OR PLEASURE IN DOING THINGS: NOT AT ALL
2. FEELING DOWN, DEPRESSED, IRRITABLE, OR HOPELESS: NOT AT ALL

## 2019-12-29 NOTE — ED PROVIDER NOTES
ED Provider Note    Scribed for Nataly Beauchamp M.D. by Racquel Bowman. 12/29/2019  2:15 PM    Primary care provider: None  Means of arrival: Walk In  History obtained from: Patient  History limited by: None    CHIEF COMPLAINT  Chief Complaint   Patient presents with   • Eye Pain     Pt with pain/drainage to left eye. seen here recently for same and pt states not better.        HPI  Sagrario Berkowitz is a 34 y.o. male with a history of HIV and medication noncompliance who presents to the Emergency Department for evaluation of left eye pain/drainage. The patient states he was seen on the 25th for similar complaints of eye pain and drainage. He reports vision is blurry and he has a hard time seeing. He states he has been using the prescribed eyedrops 4x daily. No vomiting or abdominal pain. No neck stiffness. The patient is unsure of last viral load or CD4.      REVIEW OF SYSTEMS  HEENT:  No ear pain, congestion, or sore throat   EYES: Left eye: pain and drainage. Difficulty seeing from left eye.  CARDIAC: no chest pain, no palpitations    PULMONARY: no dyspnea, cough, or congestion   GI: no vomiting, diarrhea, or abdominal pain   Neuro: no weakness, numbness, aphasia, or headache  Endocrine: no fevers, sweating, or weight loss     See history of present illness. All other systems are negative. C.    PAST MEDICAL HISTORY   has a past medical history of Infectious disease and Pulmonary embolism (HCC).    SURGICAL HISTORY  patient denies any surgical history    SOCIAL HISTORY  Social History     Tobacco Use   • Smoking status: Current Every Day Smoker     Packs/day: 0.50     Types: Cigarettes   • Smokeless tobacco: Never Used   • Tobacco comment: ppd   Substance Use Topics   • Alcohol use: Yes     Comment: vodka    • Drug use: No     Types: Inhaled     Comment: thc daily      Social History     Substance and Sexual Activity   Drug Use No   • Types: Inhaled    Comment: thc daily       FAMILY HISTORY  Family History   Problem  "Relation Age of Onset   • Thyroid Mother    • Hypertension Mother    • Hypertension Father        CURRENT MEDICATIONS  Current Outpatient Medications on File Prior to Encounter   Medication Sig Dispense Refill   • tobramycin (TOBREX) 0.3 % Solution ophthalmic solution Place 1 Drop in both eyes every 4 hours for 7 days. 1 Bottle 0        ALLERGIES  No Known Allergies    PHYSICAL EXAM  VITAL SIGNS: /98   Pulse 99   Temp 37 °C (98.6 °F) (Temporal)   Resp 18   Ht 1.854 m (6' 1\")   Wt 83 kg (182 lb 15.7 oz)   SpO2 95%   BMI 24.14 kg/m²     Constitutional: Uncomfortable appearing, Well developed, Well nourished,  Non-toxic appearance.   HEENT: Normocephalic, Atraumatic,  external ears normal, pharynx pink,  Mucous  Membranes moist, No rhinorrhea or mucosal edema  Eyes: Left eye: Lot of purulent drainage that is green and yellow color. Injection and ecchymosis of conjunctiva. Pain with EOMI  Neck: Normal range of motion, No tenderness, Supple, No stridor.   Lymphatic: No lymphadenopathy    Cardiovascular: Regular Rate and Rhythm, No murmurs,  rubs, or gallops.   Thorax & Lungs: Lungs clear to auscultation bilaterally, No respiratory distress, No wheezes, rhales or rhonchi, No chest wall tenderness.   Abdomen: Bowel sounds normal, Soft, non tender, non distended,  No pulsatile masses., no rebound guarding or peritoneal signs.   Skin: Multiple healed track marks on forearms. Warm, Dry, No erythema, No rash,   Back:  No CVA tenderness,  No spinal tenderness, bony crepitance, step offs, or instability.   Neurologic: Alert & oriented x 3, Normal motor function, Normal sensory function, No focal deficits noted. Normal reflexes. Normal Cranial Nerves.  Extremities: Normal ROM. Equal, intact distal pulses, No cyanosis, clubbing or edema,  No tenderness.   Musculoskeletal: Good range of motion in all major joints. No tenderness to palpation or major deformities noted.       DIAGNOSTIC STUDIES / " PROCEDURES    LABS  Results for orders placed or performed during the hospital encounter of 12/29/19   CBC WITH DIFFERENTIAL   Result Value Ref Range    WBC 6.4 4.8 - 10.8 K/uL    RBC 4.63 (L) 4.70 - 6.10 M/uL    Hemoglobin 14.2 14.0 - 18.0 g/dL    Hematocrit 42.1 42.0 - 52.0 %    MCV 90.9 81.4 - 97.8 fL    MCH 30.7 27.0 - 33.0 pg    MCHC 33.7 33.7 - 35.3 g/dL    RDW 39.7 35.9 - 50.0 fL    Platelet Count 213 164 - 446 K/uL    MPV 9.6 9.0 - 12.9 fL    Neutrophils-Polys 62.50 44.00 - 72.00 %    Lymphocytes 29.00 22.00 - 41.00 %    Monocytes 7.80 0.00 - 13.40 %    Eosinophils 0.30 0.00 - 6.90 %    Basophils 0.20 0.00 - 1.80 %    Immature Granulocytes 0.20 0.00 - 0.90 %    Nucleated RBC 0.00 /100 WBC    Neutrophils (Absolute) 3.99 1.82 - 7.42 K/uL    Lymphs (Absolute) 1.85 1.00 - 4.80 K/uL    Monos (Absolute) 0.50 0.00 - 0.85 K/uL    Eos (Absolute) 0.02 0.00 - 0.51 K/uL    Baso (Absolute) 0.01 0.00 - 0.12 K/uL    Immature Granulocytes (abs) 0.01 0.00 - 0.11 K/uL    NRBC (Absolute) 0.00 K/uL   COMP METABOLIC PANEL   Result Value Ref Range    Sodium 137 135 - 145 mmol/L    Potassium 4.1 3.6 - 5.5 mmol/L    Chloride 103 96 - 112 mmol/L    Co2 24 20 - 33 mmol/L    Anion Gap 10.0 0.0 - 11.9    Glucose 84 65 - 99 mg/dL    Bun 11 8 - 22 mg/dL    Creatinine 0.77 0.50 - 1.40 mg/dL    Calcium 9.4 8.5 - 10.5 mg/dL    AST(SGOT) 22 12 - 45 U/L    ALT(SGPT) 8 2 - 50 U/L    Alkaline Phosphatase 93 30 - 99 U/L    Total Bilirubin 0.2 0.1 - 1.5 mg/dL    Albumin 3.8 3.2 - 4.9 g/dL    Total Protein 8.1 6.0 - 8.2 g/dL    Globulin 4.3 (H) 1.9 - 3.5 g/dL    A-G Ratio 0.9 g/dL   ESTIMATED GFR   Result Value Ref Range    GFR If African American >60 >60 mL/min/1.73 m 2    GFR If Non African American >60 >60 mL/min/1.73 m 2   Magnesium   Result Value Ref Range    Magnesium 2.0 1.5 - 2.5 mg/dL      All labs reviewed by me.      RADIOLOGY  [id number] WITH PLUS RECONS   Final Result      1.  Extensive preseptal inflammatory changes in the LEFT  orbit, likely periorbital cellulitis.   2.  No postseptal inflammatory changes or intraorbital abscess.   3.  Calcification is seen along the RIGHT optic nerve, likely chronic.   4.  Mild chronic paranasal sinus disease.        The radiologist's interpretation of all radiological studies have been reviewed by me.    COURSE & MEDICAL DECISION MAKING  Nursing notes, VS, PMSFHx reviewed in chart.    Obtained and reviewed past medical records from 12/25/192 which indicated the patient has a history of HIV, and is noncompliant with medications. He was seen by Dr. Rangel on the 25th for purulent drainage to eyes. Culture was notable for staph and he was prescribed Tobramycin eye drops and follow up with [facility].     2:15 PM Patient seen and examined at bedside. Discussed plan of care with patient. Patient will be given fluids, cultures will be taken. CT imaging will be ordered, and opthalmology will be consulted. I informed them that labs and imaging will be ordered to evaluate symptoms. Patient is understanding and agreeable with plan.  Patient will be treated with vancomycin 2100 mg in  mL IVPB. Intravenous fluids administered for medication administration. Ordered CBC with diff. CMP, blood culture x2, CT Orbbits sella plus recons to evaluate his symptoms. The differential diagnoses include but are not limited to: orbital cellulitis.     4:14 PM Patient was reevaluated at bedside. Discussed lab and radiology results with the patient and informed them that results show periorbital cellulitis, no orbital cellulitis. Considering the patient is immune compromised, he will be admitted to the hospitalist and Ophthalmology will consult patient. Hospitalist (Dr. Pastor) and Ophthalmology (Dr. Bishop are both on page.     4:17 PM I discussed the patient's case and the above findings with Dr. Pastor ([facility]) who will evaluate the patient for hospitalization.    4:35 PM - I discussed the patient's case and  the above findings with Dr. Bishop (Opthamology) who states there is no need for antibiotic eye drops due to periorbital cellulitis, and need for IV antibiotics.They will follow up with the patient as an outpatient and advises for patient to return if symptoms are not improving within the next 24 hours. Updated the patient on plan and they are agreeable. The patient will return for new or worsening symptoms and is stable at the time of discharge. The patient is referred to a primary physician for blood pressure management, diabetic screening, and for all other preventative health concerns.     HYDRATION: Based on the patient's presentation of Other medication administration the patient was given IV fluids. IV Hydration was used because oral hydration was not adequate alone. Upon recheck following hydration, the patient was improved.        DISPOSITION:  Patient will be discharged home in stable condition.    FOLLOW UP:  No follow-up provider specified.    OUTPATIENT MEDICATIONS:  New Prescriptions    No medications on file        FINAL IMPRESSION  1. Acute bacterial conjunctivitis of left eye    2. Periorbital cellulitis of left eye    3. Immunocompromised state (HCC)          Racquel GIRON (Janee), am scribing for, and in the presence of, Nataly Beauchamp M.D..    Electronically signed by: Racquel Bowman (Janee), 12/29/2019    Nataly GIRON M.D. personally performed the services described in this documentation, as scribed by Racquel Bowman in my presence, and it is both accurate and complete. C    The note accurately reflects work and decisions made by me.  Nataly Beauchamp  12/29/2019  7:11 PM

## 2019-12-30 ENCOUNTER — PATIENT OUTREACH (OUTPATIENT)
Dept: HEALTH INFORMATION MANAGEMENT | Facility: OTHER | Age: 34
End: 2019-12-30

## 2019-12-30 VITALS
SYSTOLIC BLOOD PRESSURE: 128 MMHG | OXYGEN SATURATION: 96 % | RESPIRATION RATE: 16 BRPM | HEIGHT: 73 IN | WEIGHT: 188.27 LBS | TEMPERATURE: 98.3 F | BODY MASS INDEX: 24.95 KG/M2 | DIASTOLIC BLOOD PRESSURE: 95 MMHG | HEART RATE: 83 BPM

## 2019-12-30 PROBLEM — H10.32 ACUTE BACTERIAL CONJUNCTIVITIS OF LEFT EYE: Status: ACTIVE | Noted: 2019-12-30

## 2019-12-30 PROBLEM — A49.01 MSSA (METHICILLIN SUSCEPTIBLE STAPHYLOCOCCUS AUREUS) INFECTION: Status: ACTIVE | Noted: 2019-12-30

## 2019-12-30 LAB
ALBUMIN SERPL BCP-MCNC: 3.6 G/DL (ref 3.2–4.9)
ALBUMIN/GLOB SERPL: 0.9 G/DL
ALP SERPL-CCNC: 81 U/L (ref 30–99)
ALT SERPL-CCNC: 7 U/L (ref 2–50)
ANION GAP SERPL CALC-SCNC: 6 MMOL/L (ref 0–11.9)
AST SERPL-CCNC: 18 U/L (ref 12–45)
BASOPHILS # BLD AUTO: 0.2 % (ref 0–1.8)
BASOPHILS # BLD: 0.01 K/UL (ref 0–0.12)
BILIRUB SERPL-MCNC: 0.3 MG/DL (ref 0.1–1.5)
BUN SERPL-MCNC: 12 MG/DL (ref 8–22)
CALCIUM SERPL-MCNC: 8.9 MG/DL (ref 8.5–10.5)
CHLORIDE SERPL-SCNC: 102 MMOL/L (ref 96–112)
CO2 SERPL-SCNC: 26 MMOL/L (ref 20–33)
CREAT SERPL-MCNC: 0.68 MG/DL (ref 0.5–1.4)
EOSINOPHIL # BLD AUTO: 0.04 K/UL (ref 0–0.51)
EOSINOPHIL NFR BLD: 0.8 % (ref 0–6.9)
ERYTHROCYTE [DISTWIDTH] IN BLOOD BY AUTOMATED COUNT: 40.9 FL (ref 35.9–50)
GLOBULIN SER CALC-MCNC: 3.8 G/DL (ref 1.9–3.5)
GLUCOSE SERPL-MCNC: 93 MG/DL (ref 65–99)
GRAM STN SPEC: NORMAL
HCT VFR BLD AUTO: 42.6 % (ref 42–52)
HGB BLD-MCNC: 14.3 G/DL (ref 14–18)
IMM GRANULOCYTES # BLD AUTO: 0.01 K/UL (ref 0–0.11)
IMM GRANULOCYTES NFR BLD AUTO: 0.2 % (ref 0–0.9)
LYMPHOCYTES # BLD AUTO: 1.7 K/UL (ref 1–4.8)
LYMPHOCYTES NFR BLD: 34.8 % (ref 22–41)
MCH RBC QN AUTO: 30.8 PG (ref 27–33)
MCHC RBC AUTO-ENTMCNC: 33.6 G/DL (ref 33.7–35.3)
MCV RBC AUTO: 91.8 FL (ref 81.4–97.8)
MONOCYTES # BLD AUTO: 0.42 K/UL (ref 0–0.85)
MONOCYTES NFR BLD AUTO: 8.6 % (ref 0–13.4)
NEUTROPHILS # BLD AUTO: 2.7 K/UL (ref 1.82–7.42)
NEUTROPHILS NFR BLD: 55.4 % (ref 44–72)
NRBC # BLD AUTO: 0 K/UL
NRBC BLD-RTO: 0 /100 WBC
PLATELET # BLD AUTO: 200 K/UL (ref 164–446)
PMV BLD AUTO: 9.6 FL (ref 9–12.9)
POTASSIUM SERPL-SCNC: 4 MMOL/L (ref 3.6–5.5)
PROT SERPL-MCNC: 7.4 G/DL (ref 6–8.2)
RBC # BLD AUTO: 4.64 M/UL (ref 4.7–6.1)
SIGNIFICANT IND 70042: NORMAL
SITE SITE: NORMAL
SODIUM SERPL-SCNC: 134 MMOL/L (ref 135–145)
SOURCE SOURCE: NORMAL
VANCOMYCIN TROUGH SERPL-MCNC: 20.7 UG/ML (ref 10–20)
WBC # BLD AUTO: 4.9 K/UL (ref 4.8–10.8)

## 2019-12-30 PROCEDURE — 80053 COMPREHEN METABOLIC PANEL: CPT

## 2019-12-30 PROCEDURE — 99239 HOSP IP/OBS DSCHRG MGMT >30: CPT | Performed by: HOSPITALIST

## 2019-12-30 PROCEDURE — 85025 COMPLETE CBC W/AUTO DIFF WBC: CPT

## 2019-12-30 PROCEDURE — 700105 HCHG RX REV CODE 258: Performed by: HOSPITALIST

## 2019-12-30 PROCEDURE — 36415 COLL VENOUS BLD VENIPUNCTURE: CPT

## 2019-12-30 PROCEDURE — 87536 HIV-1 QUANT&REVRSE TRNSCRPJ: CPT

## 2019-12-30 PROCEDURE — 700105 HCHG RX REV CODE 258

## 2019-12-30 PROCEDURE — 700111 HCHG RX REV CODE 636 W/ 250 OVERRIDE (IP): Performed by: HOSPITALIST

## 2019-12-30 PROCEDURE — 86360 T CELL ABSOLUTE COUNT/RATIO: CPT

## 2019-12-30 PROCEDURE — 86359 T CELLS TOTAL COUNT: CPT

## 2019-12-30 PROCEDURE — A9270 NON-COVERED ITEM OR SERVICE: HCPCS | Performed by: HOSPITALIST

## 2019-12-30 PROCEDURE — 80202 ASSAY OF VANCOMYCIN: CPT

## 2019-12-30 PROCEDURE — 700102 HCHG RX REV CODE 250 W/ 637 OVERRIDE(OP): Performed by: HOSPITALIST

## 2019-12-30 RX ORDER — SULFAMETHOXAZOLE AND TRIMETHOPRIM 800; 160 MG/1; MG/1
1 TABLET ORAL 2 TIMES DAILY
Qty: 14 TAB | Refills: 0 | Status: SHIPPED | OUTPATIENT
Start: 2019-12-30 | End: 2020-01-06

## 2019-12-30 RX ORDER — SODIUM CHLORIDE 9 MG/ML
INJECTION, SOLUTION INTRAVENOUS
Status: COMPLETED
Start: 2019-12-30 | End: 2019-12-30

## 2019-12-30 RX ORDER — ERYTHROMYCIN 5 MG/G
1 OINTMENT OPHTHALMIC EVERY 4 HOURS
Qty: 2 TUBE | Refills: 0 | Status: SHIPPED | OUTPATIENT
Start: 2019-12-30 | End: 2020-01-06

## 2019-12-30 RX ADMIN — ERYTHROMYCIN: 5 OINTMENT OPHTHALMIC at 09:03

## 2019-12-30 RX ADMIN — OXYCODONE HYDROCHLORIDE 5 MG: 5 TABLET ORAL at 09:10

## 2019-12-30 RX ADMIN — SODIUM CHLORIDE 500 ML: 9 INJECTION, SOLUTION INTRAVENOUS at 00:39

## 2019-12-30 RX ADMIN — VANCOMYCIN HYDROCHLORIDE 1200 MG: 500 INJECTION, POWDER, LYOPHILIZED, FOR SOLUTION INTRAVENOUS at 00:39

## 2019-12-30 RX ADMIN — VANCOMYCIN HYDROCHLORIDE 1200 MG: 500 INJECTION, POWDER, LYOPHILIZED, FOR SOLUTION INTRAVENOUS at 09:03

## 2019-12-30 RX ADMIN — ERYTHROMYCIN: 5 OINTMENT OPHTHALMIC at 05:27

## 2019-12-30 RX ADMIN — ERYTHROMYCIN: 5 OINTMENT OPHTHALMIC at 12:35

## 2019-12-30 RX ADMIN — ERYTHROMYCIN: 5 OINTMENT OPHTHALMIC at 00:40

## 2019-12-30 RX ADMIN — ERYTHROMYCIN: 5 OINTMENT OPHTHALMIC at 16:51

## 2019-12-30 NOTE — ASSESSMENT & PLAN NOTE
Significant drainage coming from the periorbital site  Drainage sent for wound culture  Previous cultures of found MSSA- failed outpatient topical tobramycin  Found to be worsening and I will cover him with broad-spectrum de-escalate as necessary  Start ceftriaxone and vancomycin  IV vancomycin to cover probable strep and MRSA  Dose of vancomycin will be titrated to serum trough levels to ensure adequate levels and reduce risk of vancomycin toxicity  Goal trough 15-20  Ophthalmology was consulted in ER and suggested to start IV antibiotics.  If no improvement by tomorrow she asked to be called.  The patient returns back to baseline he can be discharged with oral antibiotics

## 2019-12-30 NOTE — ED NOTES
Pt was asking for pain medication, but had a difficult time waking him to give oral abx, will hold at this time. Pt VSS. Cleaned left eye, but continues to have discharge. Girlfriend is at bedside,

## 2019-12-30 NOTE — ED NOTES
Patient had 2 handheld devices and cash on side table, sent upstairs with the girlfriend with the okay of the patient.

## 2019-12-30 NOTE — PROGRESS NOTES
Patient is AOx4, patient is very sleepy    Pain controlled at this time.  Patient is tolerating regular diet, denies nausea/vomiting. + flatus (last BM PTA), + void  Up self with steady gait.    L eye crust and swelling.    Plan of care discussed, all questions answered.    Call light and belongings within reach, treaded slipper socks on, bed in lowest locked position.  Hourly rounding in place, all needs met at this time

## 2019-12-30 NOTE — PROGRESS NOTES
"Pharmacy Kinetics 34 y.o. male on vancomycin day # 1 2019    Currently on Vancomycin new start  Provider specified end date: TBD    Indication for Treatment: periorbital cellulitis     Pertinent history per medical record: Admitted on 2019 for worsening swelling L eye drainage.  Patient has a history of HIV (noncompliant with meds). Patient had eye swelling 3 days PTA, with rapid progression and eventual discharge. He was seen in the ED on  and cultures from that visit were positive for MSSA. Given worsening with tobramycin drops, coverage broadened pending further plan of care.     Other antibiotics: ceftriaxone 2 g IV q24h    Allergies: Patient has no known allergies.     List concerns for renal function: none    Pertinent cultures to date:    wound culture: in process   PBC x 2: in process   wound cultures: MSSA    MRSA nares swab if pneumonia is a concern: N/A    Recent Labs     19  1505   WBC 6.4   NEUTSPOLYS 62.50     Recent Labs     19  1505   BUN 11   CREATININE 0.77   ALBUMIN 3.8     /89   Pulse 83   Temp 37 °C (98.6 °F) (Temporal)   Resp 15   Ht 1.854 m (6' 1\")   Wt 83 kg (182 lb 15.7 oz)   SpO2 98%  Temp (24hrs), Av °C (98.6 °F), Min:37 °C (98.6 °F), Max:37 °C (98.6 °F)      A/P   1. Vancomycin dose change: initiate 14 mg/kg q8h  2. Next vancomycin level:  @ 1530 (prior to 4th total dose)  3. Goal trough: 12-16 mcg/mL  4. Comments: new start vancomycin for periorbital cellulitis. Minimal risk factors for renal insult and accumulation. Dose per protocol for now, level ordered for steady state. Anticipating de-escalation with new culture results. Will follow.    Abigail Ortiz, PharmD    "

## 2019-12-30 NOTE — DISCHARGE PLANNING
Care Transition Team Assessment    Pt to discharge home with no needs; uses [facility] on [address] for Rx. Pt to follow up with [facility] clinic.       Information Source  Orientation : Oriented x 4  Information Given By: Patient  Informant's Name: Sagrario Berkowitz         Elopement Risk  Legal Hold: No  Ambulatory or Self Mobile in Wheelchair: Yes  Disoriented: No  Psychiatric Symptoms: None  History of Wandering: No  Elopement this Admit: No  Vocalizing Wanting to Leave: No  Displays Behaviors, Body Language Wanting to Leave: No-Not at Risk for Elopement  Elopement Risk: Not at Risk for Elopement    Interdisciplinary Discharge Planning  Does Admitting Nurse Feel This Could be a Complex Discharge?: No  Lives with - Patient's Self Care Capacity: Other (Comments)(2 brothers)  Patient or legal guardian wants to designate a caregiver (see row info): No  Support Systems: Family Member(s), Friends / Neighbors  Housing / Facility: [address] [facility]""  Do You Take your Prescribed Medications Regularly: No  Reasons Why Not Taking Medications : Doesn't like Side Effects  Able to Return to Previous ADL's: Yes  Mobility Issues: No  Prior Services: None  Patient Expects to be Discharged to:: Home  Assistance Needed: No  Durable Medical Equipment: Not Applicable    Discharge Preparedness  What is your plan after discharge?: Home with help  What are your discharge supports?: Partner  Prior Functional Level: Ambulatory, Drives Self, Independent with Activities of Daily Living, Independent with Medication Management  Difficulity with ADLs: None  Difficulity with IADLs: None    Functional Assesment  Prior Functional Level: Ambulatory, Drives Self, Independent with Activities of Daily Living, Independent with Medication Management    Finances  Financial Barriers to Discharge: No  Prescription Coverage: Yes    Vision / Hearing Impairment  Vision Impairment : Yes  Left Eye Vision: Other (Comments)(cellulitis)  Hearing Impairment : No               Domestic Abuse  Have you ever been the victim of abuse or violence?: No  Physical Abuse or Sexual Abuse: No  Verbal Abuse or Emotional Abuse: No  Possible Abuse Reported to:: Not Applicable         Discharge Risks or Barriers  Discharge risks or barriers?: Uninsured / underinsured, Complex medical needs, No PCP  Patient risk factors: No PCP    Anticipated Discharge Information  Anticipated discharge disposition: Home  Discharge Address: [facility] [id number]  Discharge Contact Phone Number: [phone]

## 2019-12-30 NOTE — CARE PLAN
"  Problem: Communication  Goal: The ability to communicate needs accurately and effectively will improve  Outcome: PROGRESSING AS EXPECTED  Note:   Appropriately asks for what he needs.      Problem: Safety  Goal: Will remain free from falls  Outcome: PROGRESSING AS EXPECTED  Note:   Stable on his feet.      Problem: Infection  Goal: Will remain free from infection  Outcome: PROGRESSING AS EXPECTED  Note:   ABX on board. WBCs decreasing. \"My eye is feeling better\"     "

## 2019-12-30 NOTE — ED NOTES
Report given to Reginaldo RN for admit. Patient sleeping at time of transfer, also sent with eye ointment .

## 2019-12-30 NOTE — CARE PLAN
Problem: Infection  Goal: Will remain free from infection  Outcome: PROGRESSING AS EXPECTED  Intervention: Implement standard precautions and perform hand washing before and after patient contact  Note:   Implemented standard precautions foaming in and out before care     Problem: Knowledge Deficit  Goal: Knowledge of disease process/condition, treatment plan, diagnostic tests, and medications will improve  Outcome: PROGRESSING AS EXPECTED  Intervention: Assess knowledge level of disease process/condition, treatment plan, diagnostic tests, and medications  Note:   Educated patient on plan of care

## 2019-12-30 NOTE — H&P
Hospital Medicine History & Physical Note    Date of Service  12/29/2019    Primary Care Physician  No primary care provider on file.    Consultants  Ophthalmology- Dr. Bishop    Code Status  Full    Chief Complaint  Left eye discharge    History of Presenting Illness  34 y.o. male who presented 12/29/2019 with past medical history of HIV, pulmonary embolism comes into the emergency room with left eye swelling and eye pain.  Patient states that the swelling started 3 days ago.  At the time he had a runny nose and headaches.  Swelling progressed, his eyes started turning red and then he noticed discharge.  Patient does have some vision loss but states that he cannot open up his eyelids.  He also feels a gritty feeling and burning sensation of his eyes.  Patient denies any fever, chills, nausea, vomiting.  Patient right the hospital his vitals were within normal limits.  Lactic acid was within normal limits  CT scan of the head found periorbital cellulitis  Review of Systems  Review of Systems   Constitutional: Negative for chills, diaphoresis, fever and malaise/fatigue.   HENT: Positive for congestion. Negative for ear discharge, ear pain, hearing loss, nosebleeds, sinus pain, sore throat and tinnitus.    Eyes: Positive for blurred vision, photophobia, pain, discharge and redness. Negative for double vision.   Respiratory: Negative for cough, hemoptysis, sputum production, shortness of breath, wheezing and stridor.    Cardiovascular: Negative for chest pain, palpitations, orthopnea, claudication, leg swelling and PND.   Gastrointestinal: Negative for abdominal pain, blood in stool, constipation, diarrhea, heartburn, melena, nausea and vomiting.   Genitourinary: Negative for dysuria, flank pain, frequency, hematuria and urgency.   Musculoskeletal: Negative for back pain, falls, joint pain, myalgias and neck pain.   Skin: Negative for itching and rash.   Neurological: Negative for dizziness, tingling, tremors,  weakness and headaches.   Endo/Heme/Allergies: Negative for environmental allergies and polydipsia. Does not bruise/bleed easily.   Psychiatric/Behavioral: Negative for depression, hallucinations, substance abuse and suicidal ideas.       Past Medical History   has a past medical history of Infectious disease and Pulmonary embolism (HCC).    Surgical History  Surgical history is reviewed and not pertinent    Family History  family history includes Hypertension in his father and mother; Thyroid in his mother.     Social History   reports that he has been smoking cigarettes. He has been smoking about 0.50 packs per day. He has never used smokeless tobacco. He reports current alcohol use. He reports that he does not use drugs.    Allergies  No Known Allergies    Medications  Prior to Admission Medications   Prescriptions Last Dose Informant Patient Reported? Taking?   tobramycin (TOBREX) 0.3 % Solution ophthalmic solution   No No   Sig: Place 1 Drop in both eyes every 4 hours for 7 days.      Facility-Administered Medications: None       Physical Exam  Temp:  [37 °C (98.6 °F)] 37 °C (98.6 °F)  Pulse:  [83-99] 83  Resp:  [15-18] 15  BP: (107-160)/(63-98) 130/89  SpO2:  [95 %-98 %] 98 %    Physical Exam  Vitals signs and nursing note reviewed.   Constitutional:       General: He is not in acute distress.     Appearance: Normal appearance. He is not ill-appearing, toxic-appearing or diaphoretic.   HENT:      Head: Normocephalic and atraumatic.      Nose: No congestion or rhinorrhea.      Mouth/Throat:      Pharynx: No oropharyngeal exudate or posterior oropharyngeal erythema.   Eyes:      General: No scleral icterus.        Left eye: Discharge present.     Comments: Keratitis noticed   Neck:      Musculoskeletal: No neck rigidity or muscular tenderness.      Vascular: No carotid bruit.   Cardiovascular:      Rate and Rhythm: Normal rate and regular rhythm.      Pulses: Normal pulses.      Heart sounds: Normal heart  sounds. No murmur. No friction rub. No gallop.    Pulmonary:      Effort: Pulmonary effort is normal. No respiratory distress.      Breath sounds: Normal breath sounds. No stridor. No wheezing or rhonchi.   Abdominal:      General: Abdomen is flat. There is no distension.      Palpations: There is no mass.      Tenderness: There is no tenderness. There is no left CVA tenderness, guarding or rebound.      Hernia: No hernia is present.   Musculoskeletal: Normal range of motion.         General: No swelling.      Right lower leg: No edema.      Left lower leg: No edema.   Lymphadenopathy:      Cervical: No cervical adenopathy.   Skin:     General: Skin is warm and dry.      Capillary Refill: Capillary refill takes more than 3 seconds.      Coloration: Skin is not jaundiced or pale.      Findings: No bruising or erythema.   Neurological:      Mental Status: He is alert.         Laboratory:  Recent Labs     12/29/19  1505   WBC 6.4   RBC 4.63*   HEMOGLOBIN 14.2   HEMATOCRIT 42.1   MCV 90.9   MCH 30.7   MCHC 33.7   RDW 39.7   PLATELETCT 213   MPV 9.6     Recent Labs     12/29/19  1505   SODIUM 137   POTASSIUM 4.1   CHLORIDE 103   CO2 24   GLUCOSE 84   BUN 11   CREATININE 0.77   CALCIUM 9.4     Recent Labs     12/29/19  1505   ALTSGPT 8   ASTSGOT 22   ALKPHOSPHAT 93   TBILIRUBIN 0.2   GLUCOSE 84         No results for input(s): NTPROBNP in the last 72 hours.      No results for input(s): TROPONINT in the last 72 hours.    Urinalysis:    No results found     Imaging:  [id number] WITH PLUS RECONS   Final Result      1.  Extensive preseptal inflammatory changes in the LEFT orbit, likely periorbital cellulitis.   2.  No postseptal inflammatory changes or intraorbital abscess.   3.  Calcification is seen along the RIGHT optic nerve, likely chronic.   4.  Mild chronic paranasal sinus disease.            Assessment/Plan:  I anticipate this patient will require at least two midnights for appropriate medical management,  necessitating inpatient admission.    * Periorbital cellulitis  Assessment & Plan  Significant drainage coming from the periorbital site  Drainage sent for wound culture  Previous cultures of found MSSA- failed outpatient topical tobramycin  Found to be worsening and I will cover him with broad-spectrum de-escalate as necessary  Start ceftriaxone and vancomycin  IV vancomycin to cover probable strep and MRSA  Dose of vancomycin will be titrated to serum trough levels to ensure adequate levels and reduce risk of vancomycin toxicity  Goal trough 15-20  Ophthalmology was consulted in ER and suggested to start IV antibiotics.  If no improvement by tomorrow she asked to be called.  The patient returns back to baseline he can be discharged with oral antibiotics    Gonorrhea  Assessment & Plan  Positive for gonorrhea  Continue ceftriaxone  1 dose of azithromycin was given    Keratitis due to infection  Assessment & Plan  Prescribe topical erythromycin    HIV (human immunodeficiency virus infection) (HCC)- (present on admission)  Assessment & Plan  Follow-up on CD4 count and HIV DNA  Currently not on Haart therapy      VTE prophylaxis: SCD

## 2019-12-30 NOTE — DISCHARGE PLANNING
Patient is eligible for Medicaid Meds to Beds at discharge if they have coverage with [place] Medicaid, Medicaid FFS, Medicaid HMO ([facility]), or [place]. This service is provided through the [facility] if orders are received prior to 4 p.m. Monday through Friday, excluding holidays. Please call [phone] prior to discharge.

## 2019-12-30 NOTE — PROGRESS NOTES
2 RN skin check:    Left eye swelling and crustiness with yellow drainage.  Healing abrasions on BUE's and BLE's.  No other remarkable skin findings

## 2019-12-31 LAB
ANNOTATION COMMENT IMP: ABNORMAL
CD3 CELLS # BLD: 1445 CELLS/UL (ref 570–2400)
CD3+CD4+ CELLS # BLD: 251 CELLS/UL (ref 430–1800)
CD3+CD4+ CELLS/CD3+CD8+ CLL BLD: 0.22 RATIO (ref 0.8–3.9)
CD3+CD8+ CELLS # BLD: 1129 CELLS/UL (ref 210–1200)

## 2019-12-31 NOTE — DISCHARGE INSTRUCTIONS
Discharge Instructions    Discharged to home by car with friend. Discharged via walking, hospital escort: Refused.  Special equipment needed: Not Applicable    Be sure to schedule a follow-up appointment with your primary care doctor or any specialists as instructed.     Discharge Plan:   Diet Plan: Discussed  Activity Level: Discussed  Smoking Cessation Offered: Patient Refused  Confirmed Follow up Appointment: Patient to Call and Schedule Appointment  Confirmed Symptoms Management: Discussed  Medication Reconciliation Updated: Yes  Influenza Vaccine Indication: Patient Refuses    I understand that a diet low in cholesterol, fat, and sodium is recommended for good health. Unless I have been given specific instructions below for another diet, I accept this instruction as my diet prescription.   Other diet: Regular Diet    Special Instructions: None    · Is patient discharged on Warfarin / Coumadin?   No     Depression / Suicide Risk    As [facility] are [facility], it is important to learn how to keep safe from harming yourself.    Recognize the warning signs:  · Abrupt changes in personality, positive or negative- including increase in energy   · Giving away possessions  · Change in eating patterns- significant weight changes-  positive or negative  · Change in sleeping patterns- unable to sleep or sleeping all the time   · Unwillingness or inability to communicate  · Depression  · Unusual sadness, discouragement and loneliness  · Talk of wanting to die  · Neglect of personal appearance   · Rebelliousness- reckless behavior  · Withdrawal from people/activities they love  · Confusion- inability to concentrate     If you or a loved one observes any of these behaviors or has concerns about self-harm, here's what you can do:  · Talk about it- your feelings and reasons for harming yourself  · Remove any means that you might use to hurt yourself (examples: pills, rope, extension cords,  firearm)  · Get professional help from the community (Mental Health, Substance Abuse, psychological counseling)  · Do not be alone:Call your Safe Contact- someone whom you trust who will be there for you.  · Call your local CRISIS HOTLINE 629-2575 or 982-997-4812  · Call your local Children's Mobile Crisis Response Team [place] (770) 688-0446 or www.Networker  · Call the toll free National Suicide Prevention Hotlines   · National Suicide Prevention Lifeline 732-775-PNND (7915)  · [employer] Line Network 800-SUICIDE (235-1446)      Orbital Cellulitis  Introduction  Orbital cellulitis is an infection in the eye socket (orbit) and the tissues that surround the eye. The infection can spread to the eyelids, eyebrow area, and cheek. It can also cause a pocket of pus to develop around the eye (orbital abscess). In severe cases, the infection can spread to the brain. Orbital cellulitis is a medical emergency.  What are the causes?  The most common cause of this condition is a bacterial infection. The infection usually spreads to the eye socket from another part of the body. The infection may start in:  · The nose or sinuses.  · The eyelids.  · Facial skin.  · The bloodstream.  What increases the risk?  This condition is more likely to develop in people who have recently had one of the following:  · Upper respiratory infection.  · Sinus infection.  · Eyelid or facial infection.  · Eye injury.  · Infection that affects the entire body or the bloodstream (systemic infection).  What are the signs or symptoms?  Symptoms of this condition usually start quickly. Symptoms include:  · Eye pain that gets worse with eye movement.  · Swelling around the eye.  · Eye redness.  · Bulging of the eye.  · Inability to move the eye.  · Double vision.  · Fever.  How is this diagnosed?  This condition may be diagnosed based on your symptoms and an eye exam. You may also have tests to confirm the diagnosis and to check for an  orbital abscess. Other tests (cultures) may be done to find out what type of bacteria is causing the infection. Tests may include:  · Complete blood count (CBC).  · Blood culture.  · Nose, sinus, or throat culture.  · Imaging studies such as a CT scan or MRI.  How is this treated?  This condition is usually treated in a hospital. Antibiotic medicines are given directly into a vein through an IV tube.  · At first, you may get IV antibiotics to kill bacteria that often cause orbital cellulitis (broad spectrum antibiotics).  · Your medicine may be changed if cultures suggest that another antibiotic would be better.  · If the IV antibiotics are working to treat your infection, you may be switched to oral antibiotics and allowed to go home.  · In some cases, surgery may be needed to drain an orbital abscess.  Follow these instructions at home:  · Take medicines only as directed by your health care provider.  · Take your antibiotic medicine as directed by your health care provider. Finish the antibiotic even if you start to feel better.  · Return to your normal activities as directed by your health care provider. Ask your health care provider what activities are safe for you.  · Keep all follow-up visits as directed by your health care provider. This is important.  Get help right away if:  · Your eye pain or swelling returns or it gets worse.  · You have any changes in your vision.  · You have a fever.  This information is not intended to replace advice given to you by your health care provider. Make sure you discuss any questions you have with your health care provider.  Document Released: 12/12/2002 Document Revised: 05/25/2017 Document Reviewed: 12/14/2015  © 2017 Elsevier

## 2019-12-31 NOTE — DISCHARGE SUMMARY
Discharge Summary    CHIEF COMPLAINT ON ADMISSION  Chief Complaint   Patient presents with   • Eye Pain     Pt with pain/drainage to left eye. seen here recently for same and pt states not better.        Reason for Admission  Eye pain     Admission Date  12/29/2019    CODE STATUS  Full Code    HPI & HOSPITAL COURSE  This is a 34 y.o. male here with past history of HIV + not on antiviral medication and remote PE presents to the ER on 12/25/19 with left eye discharge.  Started on tobramycin ophthalmic eye drops without improvement.  Left eye culture obtained on 12/25 grew MSSA.  He had a CT orbits with preseptal cellulitis, no abscesses seen.  He has bright erythematous conjunctiva, no blurred vision.  Overnight on vancomycin IV, rocephin IV and erythromycin ophthalmic ointment, had dramatic improvement of swelling and drainage. EOMI intact, no pain on exam, no photophobia. No keratitis on exam.  Given his culture results of MSSA, dc home with bactrim DS and erythromycin ophthalmic ointment x 7 more days.  He was counseled about starting antiretroviral medications for his HIV.  Given instructions for health care clinic on [facility], he is discharged in good and stable condition to home with close outpatient follow-up.    Improved quicker than originally anticipated.    Discharge Date  12/30/19    FOLLOW UP ITEMS POST DISCHARGE  Follow up with PCP in 1 week for recheck.    DISCHARGE DIAGNOSES  Principal Problem:    Periorbital cellulitis POA: Yes  Active Problems:    HIV (human immunodeficiency virus infection) ([facility]) POA: Yes    Acute bacterial conjunctivitis of left eye POA: Yes    MSSA (methicillin susceptible Staphylococcus aureus) infection POA: Yes  Resolved Problems:    * No resolved hospital problems. *      FOLLOW UP  No future appointments.  [address] [address]  [phone]  Schedule an appointment as soon as possible for a visit        MEDICATIONS  ON DISCHARGE     Medication List      START taking these medications      Instructions   erythromycin 5 MG/GM Oint   Place 1 Application in left eye every 4 hours for 7 days.  Dose:  1 Application     sulfamethoxazole-trimethoprim 800-160 MG tablet  Commonly known as:  BACTRIM DS   Take 1 Tab by mouth 2 times a day for 7 days.  Dose:  1 Tab        STOP taking these medications    tobramycin 0.3 % Soln ophthalmic solution  Commonly known as:  TOBREX            Allergies  No Known Allergies    DIET  Orders Placed This Encounter   Procedures   • Diet Order Regular     Standing Status:   Standing     Number of Occurrences:   1     Order Specific Question:   Diet:     Answer:   Regular [1]       ACTIVITY  As tolerated.  Weight bearing as tolerated    CONSULTATIONS  Dr. Posada    PROCEDURES  12/29/2019 3:42 PM     HISTORY/REASON FOR EXAM:  Orbital cellulitis.  LEFT eye pain with drainage.     TECHNIQUE/EXAM DESCRIPTION AND NUMBER OF VIEWS:  CT Orbits with contrast, axial with coronal reconstructions.     The study was performed on a helical multidetector CT scanner. Contiguous thin section helical images were obtained of the orbits in axial plane. Coronal images were reconstructed from the helical data set. Images are reviewed at bone and soft tissue   windows.     80 mL of Omnipaque 350 nonionic contrast was injected intravenously.     Low dose optimization technique was utilized for this CT exam including automated exposure control and adjustment of the mA and/or kV according to patient size.     COMPARISON: CT head 1/4/2018     FINDINGS:  Bony orbits are intact.  Calcification seen along the RIGHT optic nerve, likely chronic.  Ocular globes are symmetric.  Disconjugate gaze noted.  Extraocular muscles are symmetric and within normal limits.  LEFT preseptal inflammatory changes with periorbital soft tissue swelling.  LEFT superior ophthalmic vein is prominent but enhances normally.  LEFT maxillary sinus polypoid  mucosal thickening.  The visualized mastoids are unremarkable.     IMPRESSION:     1.  Extensive preseptal inflammatory changes in the LEFT orbit, likely periorbital cellulitis.  2.  No postseptal inflammatory changes or intraorbital abscess.  3.  Calcification is seen along the RIGHT optic nerve, likely chronic.  4.  Mild chronic paranasal sinus disease.    LABORATORY  Lab Results   Component Value Date    SODIUM 134 (L) 12/30/2019    POTASSIUM 4.0 12/30/2019    CHLORIDE 102 12/30/2019    CO2 26 12/30/2019    GLUCOSE 93 12/30/2019    BUN 12 12/30/2019    CREATININE 0.68 12/30/2019        Lab Results   Component Value Date    WBC 4.9 12/30/2019    HEMOGLOBIN 14.3 12/30/2019    HEMATOCRIT 42.6 12/30/2019    PLATELETCT 200 12/30/2019        Total time of the discharge process exceeds 40 minutes.

## 2019-12-31 NOTE — PROGRESS NOTES
Community Health Worker Intake completed at bedside 12/30/19 by OVIDIO Salguero.  • Social determinates of health intake completed.   • Identified transportation barrier.  • Contact information provided to Sagrario Berkowitz.   • Pt. Would like to TC ZAIN[facility] to schedule an appointment in the [facility].  • Referral to RN or SW declined at this time.  • Pt. Accepted meds-to beds.    Pt. Reports living in a home with his family and girlfriend, has a support system, and feels confident managing his health.  Pt. Provided CHW with Mother's phone number and gave permission to send text messages/phone calls post-discharge.     Plan:  OVIDIO Salguero will TC pt. to conference call [facility] for an establishing pt. Appointment and a hospital follow-up appointment.  OVIDIO Salguero will provide pt. With [facility] and the [facility] transportation resource information.

## 2019-12-31 NOTE — DISCHARGE PLANNING
Medicaid Meds-to-Beds: Discharge prescription orders listed below delivered to patient's bedside. RN notified. Patient counseled.       Sagrario Berkowitz   Home Medication Instructions GILMA:[id number]    Printed on:12/30/19 2593   Medication Information                      erythromycin 5 MG/GM Ointment  Place 1 Application in left eye every 4 hours for 7 days.             sulfamethoxazole-trimethoprim (BACTRIM DS) 800-160 MG tablet  Take 1 Tab by mouth 2 times a day for 7 days.               Jane Chandler, PharmD

## 2020-01-01 LAB
HIV-1 NAAT (COPIES/ML) L204479A: ABNORMAL
HIV-1 NAAT (LOG COPIES/ML) L295410: 5.58 LOG CPY/ML
HIV1 RNA SERPL QL NAA+PROBE: DETECTED

## 2020-01-02 LAB
BACTERIA WND AEROBE CULT: NORMAL
GRAM STN SPEC: NORMAL
SIGNIFICANT IND 70042: NORMAL
SITE SITE: NORMAL
SOURCE SOURCE: NORMAL

## 2020-01-02 NOTE — PROGRESS NOTES
OVIDIO Salguero mailed pt. CCM letter since unable to reach through telephone.     Plan:  OVIDIO Salguero will d/c pt. From [facility] services 1/6/20 if unable to make contact.

## 2020-01-03 LAB
BACTERIA BLD CULT: NORMAL
BACTERIA BLD CULT: NORMAL
SIGNIFICANT IND 70042: NORMAL
SIGNIFICANT IND 70042: NORMAL
SITE SITE: NORMAL
SITE SITE: NORMAL
SOURCE SOURCE: NORMAL
SOURCE SOURCE: NORMAL

## 2020-01-09 NOTE — DOCUMENTATION QUERY
[facility]                                                                       Query Response Note      PATIENT:               EARL MCDONNELL  ACCT #:                  [id number]  MRN:                     [id number]  :                      1985  ADMIT DATE:       2019 1:51 PM  DISCH DATE:        2019 10:32 PM  RESPONDING  PROVIDER #:        [id number]           QUERY TEXT:    Please clarify patient's HIV-related diagnosis.  If an appropriate response is not listed below, please respond with a new note.      The patient's Clinical Indicators include:  Patient is a 33 y/o male presenting with periorbital cellulitis and bacterial conjunctivitis.  Patient is also HIV+ non-compliant with medications and was diagnosed this admission with Gonorrhea.  Patient was treated with IV antibiotics but was not (re)started on Haart therapy.  CD4 count was 251 on 19.  No documentation indicating patient is currently symptomatic.    Clarification is needed regarding the patient's HIV diagnosis as it impacts how ICD-10 codes are assigned.  Options provided:   -- AIDS   -- HIV+ status only   -- Unable to determine      Query created by: Odessa Craig on 2020 4:54 AM    RESPONSE TEXT:    HIV+ status only          Electronically signed by:  JORY HICKMAN MD 2020 6:12 PM

## 2020-04-06 ENCOUNTER — HOSPITAL ENCOUNTER (EMERGENCY)
Facility: MEDICAL CENTER | Age: 35
End: 2020-04-06
Attending: EMERGENCY MEDICINE
Payer: MEDICAID

## 2020-04-06 VITALS
RESPIRATION RATE: 16 BRPM | DIASTOLIC BLOOD PRESSURE: 84 MMHG | OXYGEN SATURATION: 95 % | BODY MASS INDEX: 23.58 KG/M2 | WEIGHT: 177.91 LBS | HEIGHT: 73 IN | SYSTOLIC BLOOD PRESSURE: 146 MMHG | HEART RATE: 82 BPM | TEMPERATURE: 97.2 F

## 2020-04-06 DIAGNOSIS — L02.91 ABSCESS: ICD-10-CM

## 2020-04-06 PROCEDURE — 303977 HCHG I & D

## 2020-04-06 PROCEDURE — 99283 EMERGENCY DEPT VISIT LOW MDM: CPT

## 2020-04-06 PROCEDURE — 700101 HCHG RX REV CODE 250: Performed by: EMERGENCY MEDICINE

## 2020-04-06 RX ORDER — SULFAMETHOXAZOLE AND TRIMETHOPRIM 800; 160 MG/1; MG/1
1 TABLET ORAL 2 TIMES DAILY
Qty: 14 TAB | Refills: 0 | Status: SHIPPED | OUTPATIENT
Start: 2020-04-06 | End: 2020-04-13

## 2020-04-06 RX ORDER — LIDOCAINE HYDROCHLORIDE AND EPINEPHRINE BITARTRATE 20; .01 MG/ML; MG/ML
10 INJECTION, SOLUTION SUBCUTANEOUS ONCE
Status: COMPLETED | OUTPATIENT
Start: 2020-04-06 | End: 2020-04-06

## 2020-04-06 RX ORDER — CEPHALEXIN 500 MG/1
1000 CAPSULE ORAL 2 TIMES DAILY
Qty: 28 CAP | Refills: 0 | Status: SHIPPED | OUTPATIENT
Start: 2020-04-06 | End: 2020-04-13

## 2020-04-06 RX ADMIN — LIDOCAINE HYDROCHLORIDE AND EPINEPHRINE 10 ML: 20; 10 INJECTION, SOLUTION INFILTRATION; PERINEURAL at 09:15

## 2020-04-06 ASSESSMENT — FIBROSIS 4 INDEX: FIB4 SCORE: 1.16

## 2020-04-06 NOTE — DISCHARGE INSTRUCTIONS
Follow-up in the next 2 to 3 days for recheck.  Return for increased redness swelling or discharge or should you develop fever

## 2020-04-06 NOTE — ED PROVIDER NOTES
ED Provider Note    Scribed for Hany Stoner M.D. by Reinaldo Washburn. 4/6/2020, 8:45 AM.    Primary care provider: None noted  Means of arrival: Walk in  History obtained from: Patient  History limited by: None    CHIEF COMPLAINT  Chief Complaint   Patient presents with   • Abscess     in left jaw x days. states it was just ingown hair then pulled it out popped it then worse the last 2 days. denies fever/denies chills.         HPI  Sagrario Berkowitz is a 34 y.o. male who presents to the Emergency Department for evaluation of an abscess to the left jaw onset 6 days. He reports the abscess is secondary to an ingrown hair, which he pulled out. He says he popped the abscess and a large amount of purulent discharge was expressed. He notes symptoms have worsened over the past 2 days. He denies fever, chills, cough or shortness of breath. He denies allergies to antibiotics. He denies alleviating factors.     REVIEW OF SYSTEMS  Pertinent positives include abscess to the left jaw, purulent discharge. Pertinent negatives include no fever, chills, cough or shortness of breath.      PAST MEDICAL HISTORY   has a past medical history of Infectious disease and Pulmonary embolism (HCC).    SURGICAL HISTORY  patient denies any surgical history    SOCIAL HISTORY  Social History     Tobacco Use   • Smoking status: Current Every Day Smoker     Packs/day: 0.50     Types: Cigarettes   • Smokeless tobacco: Never Used   • Tobacco comment: ppd   Substance Use Topics   • Alcohol use: Yes     Comment: vodka    • Drug use: No     Types: Inhaled     Comment: thc daily      Social History     Substance and Sexual Activity   Drug Use No   • Types: Inhaled    Comment: thc daily       FAMILY HISTORY  Family History   Problem Relation Age of Onset   • Thyroid Mother    • Hypertension Mother    • Hypertension Father        CURRENT MEDICATIONS  Home Medications     Reviewed by Laly Escobedo R.N. (Registered Nurse) on 04/06/20 at 0836  Med List Status:  "<None>   Medication Last Dose Status        Patient Alexander Taking any Medications                     ALLERGIES  No Known Allergies    PHYSICAL EXAM  VITAL SIGNS: /84   Pulse 82   Temp 36.2 °C (97.2 °F) (Temporal)   Resp 16   Ht 1.854 m (6' 1\")   Wt 80.7 kg (177 lb 14.6 oz)   SpO2 95%   BMI 23.47 kg/m²     Constitutional: Well developed, Well nourished, No acute distress, Non-toxic appearance.   HENT: Open wound at the angle of left mandible, 1 inch in diameter, With opening of 1 cm, no active purulent drainage, No surrounding erythema, No submandibular or sublingual swelling. Normocephalic, Atraumatic, TMs normal, mucous membranes moist, no erythema, exudates, swelling, or masses, nares patent  Eyes: nonicteric  Neck: No neck swelling. Supple, no meningismus  Lymphatic: No lymphadenopathy noted.   Cardiovascular: Regular rate and rhythm, no gallops rubs or murmurs  Lungs: Clear bilaterally   Abdomen: Bowel sounds normal, Soft, No tenderness  Skin: Warm, Dry, no rash  Back: No tenderness, No CVA tenderness.   Genitalia: Deferred  Rectal: Deferred  Extremities: No edema  Neurologic: Alert, appropriate, follows commands, moving all extremities, normal speech   Psychiatric: Affect normal    Incision and Drainage Procedure Note    Indication: Abscess    Procedure: The patient was positioned appropriately and the skin over the incision site was prepped with betadine and draped in a sterile fashion. Local anesthesia was obtained by infiltration using 2% Lidocaine with epinephrine. Purulent material was expressed. Loculations were not present. The drainage cavity was then irrigated and dressed with a sterile dressing. The patient’s tetanus status was up to date and did not require a booster dose.    The patient tolerated the procedure well.    Complications: None    COURSE & MEDICAL DECISION MAKING  Nursing notes, VS, PMSFHx reviewed in chart.     8:45 AM Patient seen and examined at bedside. Incision and " Drainage procedure performed by me. I discussed plan for discharge and follow up as outlined below. The patient verbalizes they feel comfortable going home. The patient is stable for discharge at this time and will return for any new or worsening symptoms. Patient verbalizes understanding and support with my plan for discharge.      Decision Making:  This is a 34 y.o. year old male who presents with a previously infected hair follicle that he pulled out and an associated abscess that he lanced several days ago.  He has an open wound at this time.  It was debrided after anesthetization.  The patient will be placed on antibiotics to cover for MRSA and standard strep.  He will otherwise follow-up in the next 2 days for recheck.    The patient will return for new or worsening symptoms and is stable at the time of discharge.    The patient is referred to a primary physician for blood pressure management, diabetic screening, and for all other preventative health concerns.    DISPOSITION:  Patient will be discharged home in stable condition.    FOLLOW UP:  [address] [address]  [phone]  In 2 days        OUTPATIENT MEDICATIONS:  New Prescriptions    CEPHALEXIN (KEFLEX) 500 MG CAP    Take 2 Caps by mouth 2 times a day for 7 days.    SULFAMETHOXAZOLE-TRIMETHOPRIM (BACTRIM DS) 800-160 MG TABLET    Take 1 Tab by mouth 2 times a day for 7 days.        FINAL IMPRESSION  1. Abscess    2.      Incision and Drainage procedure performed by Reinaldo VENEGAS (Janee), am scribing for, and in the presence of, Hany Stoner M.D..    Electronically signed by: Reinaldo Kim), 4/6/2020    Hany GIRON M.D. personally performed the services described in this documentation, as scribed by Reinaldo Washburn in my presence, and it is both accurate and complete.    The note accurately reflects work and decisions made by me.  Hany Stoner M.D.  4/6/2020  9:04 AM

## 2020-04-06 NOTE — ED TRIAGE NOTES
Chief Complaint   Patient presents with   • Abscess     in left jaw x days. states it was just ingown hair then pulled it out popped it then worse the last 2 days. denies fever/denies chills.       Denies any respiratory complaints. Denies travel/exposure to covid positive pt. Educated on triage process. Instructed to notify staff for any worsening symptoms. NAD.

## 2020-04-06 NOTE — ED NOTES
Discharge instructions reviewed with patient and signed. Prescriptions provided to him. He has all his belongings and ambulates with a steady gait

## 2020-09-29 PROCEDURE — RXMED WILLOW AMBULATORY MEDICATION CHARGE: Performed by: SPECIALIST

## 2020-09-30 ENCOUNTER — PHARMACY VISIT (OUTPATIENT)
Dept: PHARMACY | Facility: MEDICAL CENTER | Age: 35
End: 2020-09-30
Payer: OTHER MISCELLANEOUS

## 2020-10-26 PROCEDURE — RXMED WILLOW AMBULATORY MEDICATION CHARGE: Performed by: SPECIALIST

## 2020-11-10 ENCOUNTER — PHARMACY VISIT (OUTPATIENT)
Dept: PHARMACY | Facility: MEDICAL CENTER | Age: 35
End: 2020-11-10
Payer: OTHER GOVERNMENT

## 2020-12-01 PROCEDURE — RXMED WILLOW AMBULATORY MEDICATION CHARGE: Performed by: SPECIALIST

## 2020-12-02 ENCOUNTER — PHARMACY VISIT (OUTPATIENT)
Dept: PHARMACY | Facility: MEDICAL CENTER | Age: 35
End: 2020-12-02
Payer: OTHER GOVERNMENT

## 2021-01-06 PROCEDURE — RXMED WILLOW AMBULATORY MEDICATION CHARGE: Performed by: SPECIALIST

## 2021-01-07 ENCOUNTER — PHARMACY VISIT (OUTPATIENT)
Dept: PHARMACY | Facility: MEDICAL CENTER | Age: 36
End: 2021-01-07
Payer: OTHER GOVERNMENT

## 2021-01-13 ENCOUNTER — TELEMEDICINE2 (OUTPATIENT)
Dept: VASCULAR LAB | Facility: MEDICAL CENTER | Age: 36
End: 2021-01-13
Attending: INTERNAL MEDICINE
Payer: OTHER GOVERNMENT

## 2021-01-13 DIAGNOSIS — I26.99 PULMONARY EMBOLISM, OTHER, UNSPECIFIED CHRONICITY, UNSPECIFIED WHETHER ACUTE COR PULMONALE PRESENT (HCC): ICD-10-CM

## 2021-01-13 DIAGNOSIS — R63.5 WEIGHT GAIN: ICD-10-CM

## 2021-01-13 DIAGNOSIS — D72.810 CD4 T LYMPHOCYTE DEFICIENCY: ICD-10-CM

## 2021-01-13 DIAGNOSIS — B20 SYMPTOMATIC HIV INFECTION (HCC): ICD-10-CM

## 2021-01-13 PROCEDURE — 99204 OFFICE O/P NEW MOD 45 MIN: CPT | Performed by: INTERNAL MEDICINE

## 2021-01-13 ASSESSMENT — ENCOUNTER SYMPTOMS
SPUTUM PRODUCTION: 0
HEADACHES: 0
CONSTIPATION: 0
ABDOMINAL PAIN: 0
VOMITING: 0
BACK PAIN: 0
BLURRED VISION: 0
MYALGIAS: 0
NAUSEA: 0
SHORTNESS OF BREATH: 0
WEAKNESS: 0
WEIGHT LOSS: 0
DOUBLE VISION: 0
CHILLS: 0
DIARRHEA: 0
NERVOUS/ANXIOUS: 0
COUGH: 0
FEVER: 0

## 2021-01-13 NOTE — PROGRESS NOTES
Date of Service: 1/13/2021    Consult Requested By: UNIQUE    Reason for Consultation: HIV     History of Present Illness:   Sagrario Hale is a 35 y.o.  Pt has a past medical history of HIV diagnosed approximately 6 years ago.  Risk factors are unprotected sex with women.  He had not been on medications until June 2020 when they were initiated in [facility].  He was started on Tivicay 50 mg daily and Epzicom on once daily.  States he is tolerating the medications well without adverse effect and has no missed doses.  He reports that started since trying medications he is gained 15 to 20 pounds.  No recent illnesses or current complaints.    Current ART regimen: Tivicay and Epzicom    Diagnosed with HIV: 2015, confirmed HIV 1 antibody positive on 10/15/2020  Risk factors: MSW  Initial viral load, CD4, resistance testing: unknown  Prior ART regimens:     Infectious history: As any prior history of STD    Review Of Systems:  Review of Systems   Constitutional: Negative for chills, fever, malaise/fatigue and weight loss.   HENT: Negative for hearing loss.    Eyes: Negative for blurred vision and double vision.   Respiratory: Negative for cough, sputum production and shortness of breath.    Cardiovascular: Negative for chest pain and leg swelling.   Gastrointestinal: Negative for abdominal pain, constipation, diarrhea, nausea and vomiting.   Musculoskeletal: Negative for back pain, joint pain and myalgias.   Skin: Negative for rash.   Neurological: Negative for weakness and headaches.   Psychiatric/Behavioral: The patient is not nervous/anxious.          PMH:   There is no problem list on file for this patient.        PSH:  No past surgical history on file.      FAMILY HX:  None pertinent    SOCIAL HX:  ETOH: denies   Tobacco: Denies  Drug use: Denies   sexual activity: Denies    Allergies/Intolerances:  Not on File    Other Current Medications:  ART as above only    Most Recent Vital Signs:  T 97.1, heart rate 66, /79,  90% on room air, weight 214 pounds    Physical Exam(Telemedicine appointment and exam through video/audio and assistant) the auscultation equipment at a fluctuating connection so relied on the onsite provider for this portion of the exam.    Physical Exam   Constitutional: He is oriented to person, place, and time and well-developed, well-nourished, and in no distress. No distress.   HENT:   Head: Normocephalic and atraumatic.   Right Ear: External ear normal.   Left Ear: External ear normal.   Nose: Nose normal.   Mouth/Throat: Oropharynx is clear and moist.   Eyes: Pupils are equal, round, and reactive to light. Conjunctivae and EOM are normal.   Neck: Normal range of motion. Neck supple.   Cardiovascular: Normal rate, regular rhythm and normal heart sounds.   Pulmonary/Chest: Effort normal and breath sounds normal.   Abdominal: Soft. Bowel sounds are normal.   Musculoskeletal: Normal range of motion.   Neurological: He is alert and oriented to person, place, and time.   Skin: Skin is warm and dry. He is not diaphoretic.   Psychiatric: Affect and judgment normal.       Vaccines    There is no immunization history on file for this patient.      Pertinent Lab Results:    Date  CD4/%  Viral Load  12/23/20 593/26% <20     Screening:   HCV: Negative on 12/20/2020  HBV: Surface antibody low, no immunity, corner body total positive,   HAV: Hep A antibody total negative, not immune-no surface antigen results provide  Syphilis: RPR nonreactive on 12/20/20  TB  Chlamydia   GC  UA      CBC  Date  WBC  HGB  PLAT  12/23/20 5.9  16.1  237      CMP  Date  CR/BUN E-LYTE TBili  AlkPh  AST ALT  12/23/20  1.00/12   0.3  81  20 6    Lipids   Total cholesterol 183, triglycerides 124, HDL 33, PDL D 23,     HgbA1C:  6.3     Imaging/Studies:      ASSESSMENT:     35 y.o.  Pt has a past medical history of HIV diagnosed approximately 6 years ago.  Risk factors are unprotected sex with women.  He had not been on medications until June  2020 when [facility]  He was started on Tivicay 50 mg daily and Epzicom on once daily.  States he is tolerating the medications well without adverse effect and has no missed doses.  He reports that started since trying medications he is gained 15 to 20 pounds.      Current ART regimen: Tivicay and Epzicom, or load undetected        PLAN:     HIV  --- Continue ART -we will transition from Tivicay and Epzicom to Triumeq which is essentially the same 3 drug regimen but combined into 1 pill as he appears to be tolerating it well and viral load is undetected.  Unknown if HLA-B*5701 was ever tested.  Unlikely to be a concern given that he is tolerating well but will recommend testing to ensure no potential for adverse effect.  If the patient has new onset fevers, rash nausea vomiting diarrhea or abdominal pain or respiratory symptoms then stop Triumeq and notify ID.    --- Labs now:  Hepatitis B surface antigen & HLA-B*5701 screen (179068)     --- Labs prior to next visit: ([facility] numbers are provided):   o HIV Viral Load (444810)  o CD4-Lymphocyte Assay (787983)   o Complete Blood Count with differential and platelets  o Comprehensive Metabolic Profile  o Urinalysis   o Hepatitis C virus antibody (214362) - prior to 1st visit and once a year   o PPD or Quantiferon Gold - or documentation -  prior to 1st visit and once a year   o Documentation of CXR if positive PPD    --- Vaccines: HBV and HAV vaccine series if patient is not immune. Tdap, PCV13, PPSV 23, Influenza and meningococcal vaccines series once available. (Do not give meningococcal and PCV13 together space by at least 4 weeks. Do not give PPSV 23 and PCV13 together - space by at least 8 weeks.)     --- RTC in 3 months     Positive hepatitis B core antibody, surface antigen testing not available on recent labs  Patients with a positive HepB core antibody and negative surface antigen likely have a resolved prior HBV infection.  Rarely the  testing could be due to a false positive, low-level chronic HBV infection or resolving acute infection.     --- Monitor liver function as above and notify ID if any new concerning symptoms such as fever, jaundice, severe abdominal pain or marked increase in ALT.      Weight gain, 15-20 pound since starting ART  -Weight gain has been associated with dolutegravir but it is more typically associated with articular plus tenofovir which patient is not on.  Weight gain can also be normalization of weight which was abnormally low due to HIV.  --- Monitor, increase exercise and healthy diet    Prediabetes  --- Discuss with onsite provider    History of pulmonary embolism  -Per patient in no records to confirm  --- Discuss with onsite provider      Jesenia Lundberg MD  Infectious Diseases           Jesenia Lundberg M.D.

## 2021-02-08 PROCEDURE — RXMED WILLOW AMBULATORY MEDICATION CHARGE: Performed by: INTERNAL MEDICINE

## 2021-02-25 ENCOUNTER — PHARMACY VISIT (OUTPATIENT)
Dept: PHARMACY | Facility: MEDICAL CENTER | Age: 36
End: 2021-02-25
Payer: OTHER GOVERNMENT

## 2021-05-13 ENCOUNTER — HOSPITAL ENCOUNTER (EMERGENCY)
Facility: MEDICAL CENTER | Age: 36
End: 2021-05-13
Attending: EMERGENCY MEDICINE
Payer: MEDICAID

## 2021-05-13 VITALS
BODY MASS INDEX: 23.96 KG/M2 | SYSTOLIC BLOOD PRESSURE: 136 MMHG | TEMPERATURE: 98.2 F | OXYGEN SATURATION: 95 % | RESPIRATION RATE: 18 BRPM | DIASTOLIC BLOOD PRESSURE: 84 MMHG | HEART RATE: 109 BPM | HEIGHT: 73 IN | WEIGHT: 180.78 LBS

## 2021-05-13 DIAGNOSIS — Z11.52 ENCOUNTER FOR SCREENING FOR COVID-19: ICD-10-CM

## 2021-05-13 PROCEDURE — 99283 EMERGENCY DEPT VISIT LOW MDM: CPT

## 2021-05-13 PROCEDURE — U0005 INFEC AGEN DETEC AMPLI PROBE: HCPCS

## 2021-05-13 PROCEDURE — U0003 INFECTIOUS AGENT DETECTION BY NUCLEIC ACID (DNA OR RNA); SEVERE ACUTE RESPIRATORY SYNDROME CORONAVIRUS 2 (SARS-COV-2) (CORONAVIRUS DISEASE [COVID-19]), AMPLIFIED PROBE TECHNIQUE, MAKING USE OF HIGH THROUGHPUT TECHNOLOGIES AS DESCRIBED BY CMS-2020-01-R: HCPCS

## 2021-05-13 ASSESSMENT — FIBROSIS 4 INDEX: FIB4 SCORE: 1.19

## 2021-05-14 LAB
SARS-COV-2 RNA RESP QL NAA+PROBE: NOTDETECTED
SPECIMEN SOURCE: NORMAL

## 2021-05-14 NOTE — ED TRIAGE NOTES
Chief Complaint   Patient presents with   • Coronavirus Screening     pt's neighbor just recently got out of the hospital for COVID and pt has been over to neighbor's house multiple times before     Pt ambulatory to triage with girlfriend for above complaint. Pt denies any symptoms at this time.     Pt is alert/oriented and follows commands. Pt speaking in full sentences and responds appropriately to questions. No acute distress noted in triage and respirations are even and unlabored.     Pt placed in lobby and educated on triage process. Pt encouraged to alert staff for any changes in condition.

## 2021-05-14 NOTE — ED PROVIDER NOTES
"ED Provider Note    Scribed for Bishop Viera M.D. by Raleigh Romo. 5/13/2021, 8:24 PM.    Primary care provider: No primary care provider noted.  Means of arrival: Walk in  History obtained from: Patient  History limited by: None    CHIEF COMPLAINT  Chief Complaint   Patient presents with    Coronavirus Screening     pt's neighbor just recently got out of the hospital for COVID and pt has been over to neighbor's house multiple times before       HPI  Sagrario Berkowitz is a 35 y.o. male who presents to the Emergency Department for evaluation of possible COVID-19. He denies cough, fever, or chills. No alleviating factors were reported. The patient is relatively healthy but has a history of pulmonary embolism, and HIV.  Currently taking medications his CD4 count has been normal viral load is undetectable.      REVIEW OF SYSTEMS  See HPI above     PAST MEDICAL HISTORY   has a past medical history of Infectious disease and Pulmonary embolism (HCC).    SURGICAL HISTORY  patient denies any surgical history    SOCIAL HISTORY  Social History     Tobacco Use    Smoking status: Current Every Day Smoker     Packs/day: 0.50     Types: Cigarettes    Smokeless tobacco: Former User    Tobacco comment: ppd   Vaping Use    Vaping Use: Former   Substance Use Topics    Alcohol use: Yes     Comment: Occasionally; \"2-3x/month maybe\"    Drug use: Not Currently     Types: Inhaled     Comment: previously smoked marijauna      Social History     Substance and Sexual Activity   Drug Use Not Currently    Types: Inhaled    Comment: previously smoked marijauna       FAMILY HISTORY  Family History   Problem Relation Age of Onset    Thyroid Mother     Hypertension Mother     Hypertension Father        CURRENT MEDICATIONS  Home Medications       Reviewed by Krystal Banks R.N. (Registered Nurse) on 05/13/21 at 1721  Med List Status: Not Addressed     Medication Last Dose Status   abacavir-lamivudine (EPZICOM) 600-300 MG per tablet  Active " "  dolutegravir (TIVICAY) 50 MG Tab tablet  Active                    ALLERGIES  No Known Allergies    PHYSICAL EXAM  VITAL SIGNS: /81   Pulse (!) 105   Temp 36.8 °C (98.3 °F) (Temporal)   Resp 18   Ht 1.854 m (6' 1\")   Wt 82 kg (180 lb 12.4 oz)   SpO2 96%   BMI 23.85 kg/m²     Constitutional: Well developed, Well nourished, No acute distress, Non-toxic appearance.   Eyes: conjunctiva is normal. There are no signs of exudate.  Cardiovascular: Regular rate and rhythm without murmurs gallops or rubs.   Thorax & Lungs: No respiratory distress. Breathing comfortably. Lungs are clear to auscultation bilaterally, there are no wheezes no rales. Chest wall is nontender.  Neurologic: Alert & oriented x 3, Normal motor function, Normal sensory function, No focal deficits noted.   Psychiatric: Affect normal, Judgment normal, Mood normal.     COURSE & MEDICAL DECISION MAKING  Nursing notes, VS, PMSFHx reviewed in chart.    8:24 PM - Patient seen and examined at bedside. Patient exhibited no symptoms at bedside but is concerned of infection due to exposure to infected individual with COVID-19. Ordered SARS-CoV-2 PCR to evaluate his symptoms. I discussed plan for discharge and follow up as outlined below. The patient verbalizes they feel comfortable going home. The patient is stable for discharge at this time and will return for any new or worsening symptoms. Patient verbalizes understanding and support with my plan for discharge.     Decision Making:  Patient is requesting a Covid test just for his area of location.  Currently is asymptomatic.  I advised for patient to continue quarantining for ten days after exposure. Patient should hydrate and rest if symptoms occur. Fever and muscle aches may be treated with Tylenol and ibuprofen. If patient tests positive for COVID-19, they should proceed with caution and take all preventative measures such as social distancing and isolating. See primary care physician for any " new or worsening symptoms.    The patient will return for new or worsening symptoms and is stable at the time of discharge.    The patient is referred to a primary physician for blood pressure management, diabetic screening, and for all other preventative health concerns.    DISPOSITION:  Patient will be discharged home in stable condition.    FOLLOW UP:  [address] [id number] [phone]        [address] [address] [phone]      FINAL IMPRESSION  1. Encounter for screening for COVID-19          Raleigh GIRON (Scribe), am scribing for, and in the presence of, Bishop Viera M.D..    Electronically signed by: Raleigh Romo (Joniibkady), 5/13/2021    IBsihop M.D. personally performed the services described in this documentation, as scribed by Raleigh Romo in my presence, and it is both accurate and complete. E    The note accurately reflects work and decisions made by me.  Bishop Viera M.D.  5/13/2021  10:24 PM

## 2021-10-26 ENCOUNTER — APPOINTMENT (OUTPATIENT)
Dept: RADIOLOGY | Facility: MEDICAL CENTER | Age: 36
DRG: 917 | End: 2021-10-26
Attending: EMERGENCY MEDICINE
Payer: MEDICAID

## 2021-10-26 ENCOUNTER — APPOINTMENT (OUTPATIENT)
Dept: RADIOLOGY | Facility: MEDICAL CENTER | Age: 36
DRG: 917 | End: 2021-10-26
Attending: INTERNAL MEDICINE
Payer: MEDICAID

## 2021-10-26 ENCOUNTER — HOSPITAL ENCOUNTER (INPATIENT)
Facility: MEDICAL CENTER | Age: 36
LOS: 5 days | DRG: 917 | End: 2021-10-31
Attending: EMERGENCY MEDICINE | Admitting: INTERNAL MEDICINE
Payer: MEDICAID

## 2021-10-26 DIAGNOSIS — J96.00 ACUTE RESPIRATORY FAILURE, UNSPECIFIED WHETHER WITH HYPOXIA OR HYPERCAPNIA (HCC): ICD-10-CM

## 2021-10-26 DIAGNOSIS — K04.7 DENTAL ABSCESS: ICD-10-CM

## 2021-10-26 DIAGNOSIS — K04.7 APICAL ALVEOLAR ABSCESS: ICD-10-CM

## 2021-10-26 DIAGNOSIS — G93.40 ENCEPHALOPATHY: ICD-10-CM

## 2021-10-26 LAB
ALBUMIN SERPL BCP-MCNC: 4.5 G/DL (ref 3.2–4.9)
ALBUMIN/GLOB SERPL: 1.1 G/DL
ALP SERPL-CCNC: 94 U/L (ref 30–99)
ALT SERPL-CCNC: 11 U/L (ref 2–50)
AMMONIA PLAS-SCNC: 16 UMOL/L (ref 11–45)
AMPHET UR QL SCN: POSITIVE
ANION GAP SERPL CALC-SCNC: 14 MMOL/L (ref 7–16)
ANION GAP SERPL CALC-SCNC: 8 MMOL/L (ref 7–16)
APPEARANCE UR: CLEAR
APTT PPP: 30.8 SEC (ref 24.7–36)
AST SERPL-CCNC: 28 U/L (ref 12–45)
BACTERIA #/AREA URNS HPF: NEGATIVE /HPF
BARBITURATES UR QL SCN: NEGATIVE
BASE EXCESS BLDA CALC-SCNC: -4 MMOL/L (ref -4–3)
BASE EXCESS BLDA CALC-SCNC: 2 MMOL/L (ref -4–3)
BASOPHILS # BLD AUTO: 0.2 % (ref 0–1.8)
BASOPHILS # BLD: 0.02 K/UL (ref 0–0.12)
BENZODIAZ UR QL SCN: NEGATIVE
BILIRUB SERPL-MCNC: 0.2 MG/DL (ref 0.1–1.5)
BILIRUB UR QL STRIP.AUTO: NEGATIVE
BODY TEMPERATURE: ABNORMAL DEGREES
BODY TEMPERATURE: ABNORMAL DEGREES
BREATHS SETTING VENT: 20
BREATHS SETTING VENT: 24
BUN SERPL-MCNC: 11 MG/DL (ref 8–22)
BUN SERPL-MCNC: 11 MG/DL (ref 8–22)
BZE UR QL SCN: NEGATIVE
CALCIUM SERPL-MCNC: 10.2 MG/DL (ref 8.5–10.5)
CALCIUM SERPL-MCNC: 8.8 MG/DL (ref 8.5–10.5)
CANNABINOIDS UR QL SCN: POSITIVE
CHLORIDE SERPL-SCNC: 100 MMOL/L (ref 96–112)
CHLORIDE SERPL-SCNC: 103 MMOL/L (ref 96–112)
CO2 BLDA-SCNC: 25 MMOL/L (ref 20–33)
CO2 BLDA-SCNC: 28 MMOL/L (ref 20–33)
CO2 SERPL-SCNC: 22 MMOL/L (ref 20–33)
CO2 SERPL-SCNC: 25 MMOL/L (ref 20–33)
COLOR UR: YELLOW
CREAT SERPL-MCNC: 0.84 MG/DL (ref 0.5–1.4)
CREAT SERPL-MCNC: 1.01 MG/DL (ref 0.5–1.4)
CRYPTOC AG CSF QL LA: NEGATIVE
DELSYS IDSYS: ABNORMAL
DELSYS IDSYS: ABNORMAL
EKG IMPRESSION: NORMAL
END TIDAL CARBON DIOXIDE IECO2: 34 MMHG
EOSINOPHIL # BLD AUTO: 0.07 K/UL (ref 0–0.51)
EOSINOPHIL NFR BLD: 0.8 % (ref 0–6.9)
EPI CELLS #/AREA URNS HPF: ABNORMAL /HPF
ERYTHROCYTE [DISTWIDTH] IN BLOOD BY AUTOMATED COUNT: 39.7 FL (ref 35.9–50)
GLOBULIN SER CALC-MCNC: 4.2 G/DL (ref 1.9–3.5)
GLUCOSE BLD-MCNC: 104 MG/DL (ref 65–99)
GLUCOSE BLD-MCNC: 108 MG/DL (ref 65–99)
GLUCOSE BLD-MCNC: 44 MG/DL (ref 65–99)
GLUCOSE BLD-MCNC: 93 MG/DL (ref 65–99)
GLUCOSE CSF-MCNC: 58 MG/DL (ref 40–80)
GLUCOSE SERPL-MCNC: 106 MG/DL (ref 65–99)
GLUCOSE SERPL-MCNC: 135 MG/DL (ref 65–99)
GLUCOSE UR STRIP.AUTO-MCNC: NEGATIVE MG/DL
GRAM STN SPEC: NORMAL
HCO3 BLDA-SCNC: 23.8 MMOL/L (ref 17–25)
HCO3 BLDA-SCNC: 26.7 MMOL/L (ref 17–25)
HCT VFR BLD AUTO: 48.5 % (ref 42–52)
HGB BLD-MCNC: 16.7 G/DL (ref 14–18)
HOROWITZ INDEX BLDA+IHG-RTO: 257 MM[HG]
HOROWITZ INDEX BLDA+IHG-RTO: 393 MM[HG]
HYALINE CASTS #/AREA URNS LPF: ABNORMAL /LPF
IMM GRANULOCYTES # BLD AUTO: 0.03 K/UL (ref 0–0.11)
IMM GRANULOCYTES NFR BLD AUTO: 0.3 % (ref 0–0.9)
INR PPP: 0.95 (ref 0.87–1.13)
KETONES UR STRIP.AUTO-MCNC: NEGATIVE MG/DL
LACTATE BLD-SCNC: 2 MMOL/L (ref 0.5–2)
LACTATE BLD-SCNC: 2.8 MMOL/L (ref 0.5–2)
LEUKOCYTE ESTERASE UR QL STRIP.AUTO: NEGATIVE
LYMPHOCYTES # BLD AUTO: 2.28 K/UL (ref 1–4.8)
LYMPHOCYTES NFR BLD: 25 % (ref 22–41)
MAGNESIUM SERPL-MCNC: 2.2 MG/DL (ref 1.5–2.5)
MCH RBC QN AUTO: 30.9 PG (ref 27–33)
MCHC RBC AUTO-ENTMCNC: 34.4 G/DL (ref 33.7–35.3)
MCV RBC AUTO: 89.8 FL (ref 81.4–97.8)
METHADONE UR QL SCN: NEGATIVE
MICRO URNS: ABNORMAL
MODE IMODE: ABNORMAL
MODE IMODE: ABNORMAL
MONOCYTES # BLD AUTO: 0.7 K/UL (ref 0–0.85)
MONOCYTES NFR BLD AUTO: 7.7 % (ref 0–13.4)
NEUTROPHILS # BLD AUTO: 6.03 K/UL (ref 1.82–7.42)
NEUTROPHILS NFR BLD: 66 % (ref 44–72)
NITRITE UR QL STRIP.AUTO: NEGATIVE
NRBC # BLD AUTO: 0 K/UL
NRBC BLD-RTO: 0 /100 WBC
O2/TOTAL GAS SETTING VFR VENT: 30 %
O2/TOTAL GAS SETTING VFR VENT: 30 %
OPIATES UR QL SCN: NEGATIVE
OSMOLALITY SERPL: 332 MOSM/KG H2O (ref 278–298)
OXYCODONE UR QL SCN: NEGATIVE
PCO2 BLDA: 41.3 MMHG (ref 26–37)
PCO2 BLDA: 52 MMHG (ref 26–37)
PCO2 TEMP ADJ BLDA: 40.7 MMHG (ref 26–37)
PCO2 TEMP ADJ BLDA: 51.8 MMHG (ref 26–37)
PCP UR QL SCN: NEGATIVE
PEEP END EXPIRATORY PRESSURE IPEEP: 8 CMH20
PEEP END EXPIRATORY PRESSURE IPEEP: 8 CMH20
PH BLDA: 7.27 [PH] (ref 7.4–7.5)
PH BLDA: 7.42 [PH] (ref 7.4–7.5)
PH TEMP ADJ BLDA: 7.27 [PH] (ref 7.4–7.5)
PH TEMP ADJ BLDA: 7.42 [PH] (ref 7.4–7.5)
PH UR STRIP.AUTO: 6 [PH] (ref 5–8)
PLATELET # BLD AUTO: 305 K/UL (ref 164–446)
PMV BLD AUTO: 9.6 FL (ref 9–12.9)
PO2 BLDA: 118 MMHG (ref 64–87)
PO2 BLDA: 77 MMHG (ref 64–87)
PO2 TEMP ADJ BLDA: 117 MMHG (ref 64–87)
PO2 TEMP ADJ BLDA: 75 MMHG (ref 64–87)
POTASSIUM SERPL-SCNC: 4 MMOL/L (ref 3.6–5.5)
POTASSIUM SERPL-SCNC: 4.5 MMOL/L (ref 3.6–5.5)
PROPOXYPH UR QL SCN: NEGATIVE
PROT CSF-MCNC: 29 MG/DL (ref 15–45)
PROT SERPL-MCNC: 8.7 G/DL (ref 6–8.2)
PROT UR QL STRIP: NEGATIVE MG/DL
PROTHROMBIN TIME: 12.4 SEC (ref 12–14.6)
RBC # BLD AUTO: 5.4 M/UL (ref 4.7–6.1)
RBC # URNS HPF: ABNORMAL /HPF
RBC UR QL AUTO: ABNORMAL
RENAL EPI CELLS #/AREA URNS HPF: ABNORMAL /HPF
RPR SER QL: NON REACTIVE
SAO2 % BLDA: 95 % (ref 93–99)
SAO2 % BLDA: 98 % (ref 93–99)
SARS-COV-2 RNA RESP QL NAA+PROBE: NOTDETECTED
SIGNIFICANT IND 70042: NORMAL
SITE SITE: NORMAL
SODIUM SERPL-SCNC: 136 MMOL/L (ref 135–145)
SODIUM SERPL-SCNC: 136 MMOL/L (ref 135–145)
SOURCE SOURCE: NORMAL
SP GR UR STRIP.AUTO: 1.02
SPECIMEN DRAWN FROM PATIENT: ABNORMAL
SPECIMEN DRAWN FROM PATIENT: ABNORMAL
SPECIMEN SOURCE: NORMAL
TIDAL VOLUME IVT: 400 ML
TIDAL VOLUME IVT: 430 ML
TREPONEMA PALLIDUM IGG+IGM AB [PRESENCE] IN SERUM OR PLASMA BY IMMUNOASSAY: REACTIVE
TROPONIN T SERPL-MCNC: 7 NG/L (ref 6–19)
UROBILINOGEN UR STRIP.AUTO-MCNC: 0.2 MG/DL
WBC # BLD AUTO: 9.1 K/UL (ref 4.8–10.8)
WBC #/AREA URNS HPF: ABNORMAL /HPF

## 2021-10-26 PROCEDURE — 31500 INSERT EMERGENCY AIRWAY: CPT

## 2021-10-26 PROCEDURE — 83605 ASSAY OF LACTIC ACID: CPT

## 2021-10-26 PROCEDURE — 96367 TX/PROPH/DG ADDL SEQ IV INF: CPT

## 2021-10-26 PROCEDURE — 5A1945Z RESPIRATORY VENTILATION, 24-96 CONSECUTIVE HOURS: ICD-10-PCS | Performed by: EMERGENCY MEDICINE

## 2021-10-26 PROCEDURE — 82962 GLUCOSE BLOOD TEST: CPT

## 2021-10-26 PROCEDURE — 80053 COMPREHEN METABOLIC PANEL: CPT

## 2021-10-26 PROCEDURE — 86592 SYPHILIS TEST NON-TREP QUAL: CPT

## 2021-10-26 PROCEDURE — 81001 URINALYSIS AUTO W/SCOPE: CPT

## 2021-10-26 PROCEDURE — 96365 THER/PROPH/DIAG IV INF INIT: CPT

## 2021-10-26 PROCEDURE — 700101 HCHG RX REV CODE 250: Performed by: INTERNAL MEDICINE

## 2021-10-26 PROCEDURE — 36600 WITHDRAWAL OF ARTERIAL BLOOD: CPT

## 2021-10-26 PROCEDURE — U0005 INFEC AGEN DETEC AMPLI PROBE: HCPCS

## 2021-10-26 PROCEDURE — 94003 VENT MGMT INPAT SUBQ DAY: CPT

## 2021-10-26 PROCEDURE — 700102 HCHG RX REV CODE 250 W/ 637 OVERRIDE(OP): Performed by: INTERNAL MEDICINE

## 2021-10-26 PROCEDURE — 700105 HCHG RX REV CODE 258: Performed by: EMERGENCY MEDICINE

## 2021-10-26 PROCEDURE — 85025 COMPLETE CBC W/AUTO DIFF WBC: CPT

## 2021-10-26 PROCEDURE — 86403 PARTICLE AGGLUT ANTBDY SCRN: CPT

## 2021-10-26 PROCEDURE — 87040 BLOOD CULTURE FOR BACTERIA: CPT | Mod: 91

## 2021-10-26 PROCEDURE — 96368 THER/DIAG CONCURRENT INF: CPT

## 2021-10-26 PROCEDURE — 70450 CT HEAD/BRAIN W/O DYE: CPT

## 2021-10-26 PROCEDURE — 82945 GLUCOSE OTHER FLUID: CPT

## 2021-10-26 PROCEDURE — 82140 ASSAY OF AMMONIA: CPT

## 2021-10-26 PROCEDURE — 94799 UNLISTED PULMONARY SVC/PX: CPT

## 2021-10-26 PROCEDURE — 87070 CULTURE OTHR SPECIMN AEROBIC: CPT

## 2021-10-26 PROCEDURE — 80307 DRUG TEST PRSMV CHEM ANLYZR: CPT

## 2021-10-26 PROCEDURE — 82803 BLOOD GASES ANY COMBINATION: CPT | Mod: 91

## 2021-10-26 PROCEDURE — 89051 BODY FLUID CELL COUNT: CPT

## 2021-10-26 PROCEDURE — 85610 PROTHROMBIN TIME: CPT

## 2021-10-26 PROCEDURE — 009U3ZX DRAINAGE OF SPINAL CANAL, PERCUTANEOUS APPROACH, DIAGNOSTIC: ICD-10-PCS | Performed by: EMERGENCY MEDICINE

## 2021-10-26 PROCEDURE — 770022 HCHG ROOM/CARE - ICU (200)

## 2021-10-26 PROCEDURE — 84484 ASSAY OF TROPONIN QUANT: CPT

## 2021-10-26 PROCEDURE — 87205 SMEAR GRAM STAIN: CPT

## 2021-10-26 PROCEDURE — 94002 VENT MGMT INPAT INIT DAY: CPT

## 2021-10-26 PROCEDURE — 84157 ASSAY OF PROTEIN OTHER: CPT

## 2021-10-26 PROCEDURE — 96375 TX/PRO/DX INJ NEW DRUG ADDON: CPT

## 2021-10-26 PROCEDURE — 700111 HCHG RX REV CODE 636 W/ 250 OVERRIDE (IP): Performed by: INTERNAL MEDICINE

## 2021-10-26 PROCEDURE — 700105 HCHG RX REV CODE 258: Performed by: INTERNAL MEDICINE

## 2021-10-26 PROCEDURE — 303105 HCHG CATHETER EXTRA

## 2021-10-26 PROCEDURE — 700117 HCHG RX CONTRAST REV CODE 255: Performed by: EMERGENCY MEDICINE

## 2021-10-26 PROCEDURE — 85730 THROMBOPLASTIN TIME PARTIAL: CPT

## 2021-10-26 PROCEDURE — 80048 BASIC METABOLIC PNL TOTAL CA: CPT

## 2021-10-26 PROCEDURE — 62270 DX LMBR SPI PNXR: CPT

## 2021-10-26 PROCEDURE — 71045 X-RAY EXAM CHEST 1 VIEW: CPT

## 2021-10-26 PROCEDURE — 700111 HCHG RX REV CODE 636 W/ 250 OVERRIDE (IP): Performed by: EMERGENCY MEDICINE

## 2021-10-26 PROCEDURE — 94760 N-INVAS EAR/PLS OXIMETRY 1: CPT

## 2021-10-26 PROCEDURE — 302214 INTUBATION BOX: Performed by: EMERGENCY MEDICINE

## 2021-10-26 PROCEDURE — 93005 ELECTROCARDIOGRAM TRACING: CPT | Performed by: EMERGENCY MEDICINE

## 2021-10-26 PROCEDURE — 99291 CRITICAL CARE FIRST HOUR: CPT

## 2021-10-26 PROCEDURE — A9270 NON-COVERED ITEM OR SERVICE: HCPCS | Performed by: INTERNAL MEDICINE

## 2021-10-26 PROCEDURE — 51702 INSERT TEMP BLADDER CATH: CPT

## 2021-10-26 PROCEDURE — 70487 CT MAXILLOFACIAL W/DYE: CPT

## 2021-10-26 PROCEDURE — U0003 INFECTIOUS AGENT DETECTION BY NUCLEIC ACID (DNA OR RNA); SEVERE ACUTE RESPIRATORY SYNDROME CORONAVIRUS 2 (SARS-COV-2) (CORONAVIRUS DISEASE [COVID-19]), AMPLIFIED PROBE TECHNIQUE, MAKING USE OF HIGH THROUGHPUT TECHNOLOGIES AS DESCRIBED BY CMS-2020-01-R: HCPCS

## 2021-10-26 PROCEDURE — 700101 HCHG RX REV CODE 250: Performed by: STUDENT IN AN ORGANIZED HEALTH CARE EDUCATION/TRAINING PROGRAM

## 2021-10-26 PROCEDURE — 86780 TREPONEMA PALLIDUM: CPT

## 2021-10-26 PROCEDURE — 99291 CRITICAL CARE FIRST HOUR: CPT | Mod: GC | Performed by: INTERNAL MEDICINE

## 2021-10-26 PROCEDURE — 83930 ASSAY OF BLOOD OSMOLALITY: CPT

## 2021-10-26 PROCEDURE — 0BH17EZ INSERTION OF ENDOTRACHEAL AIRWAY INTO TRACHEA, VIA NATURAL OR ARTIFICIAL OPENING: ICD-10-PCS | Performed by: EMERGENCY MEDICINE

## 2021-10-26 PROCEDURE — 99292 CRITICAL CARE ADDL 30 MIN: CPT | Performed by: INTERNAL MEDICINE

## 2021-10-26 PROCEDURE — 700111 HCHG RX REV CODE 636 W/ 250 OVERRIDE (IP)

## 2021-10-26 PROCEDURE — 83735 ASSAY OF MAGNESIUM: CPT

## 2021-10-26 RX ORDER — HALOPERIDOL 5 MG/ML
5 INJECTION INTRAMUSCULAR ONCE
Status: COMPLETED | OUTPATIENT
Start: 2021-10-26 | End: 2021-10-26

## 2021-10-26 RX ORDER — DIPHENHYDRAMINE HYDROCHLORIDE 50 MG/ML
50 INJECTION INTRAMUSCULAR; INTRAVENOUS ONCE
Status: COMPLETED | OUTPATIENT
Start: 2021-10-26 | End: 2021-10-26

## 2021-10-26 RX ORDER — SUCCINYLCHOLINE CHLORIDE 20 MG/ML
100 INJECTION INTRAMUSCULAR; INTRAVENOUS ONCE
Status: COMPLETED | OUTPATIENT
Start: 2021-10-26 | End: 2021-10-26

## 2021-10-26 RX ORDER — LORAZEPAM 2 MG/ML
2 INJECTION INTRAMUSCULAR ONCE
Status: COMPLETED | OUTPATIENT
Start: 2021-10-26 | End: 2021-10-26

## 2021-10-26 RX ORDER — LORAZEPAM 2 MG/ML
INJECTION INTRAMUSCULAR
Status: COMPLETED
Start: 2021-10-26 | End: 2021-10-26

## 2021-10-26 RX ORDER — FAMOTIDINE 20 MG/1
20 TABLET, FILM COATED ORAL EVERY 12 HOURS
Status: DISCONTINUED | OUTPATIENT
Start: 2021-10-26 | End: 2021-10-28

## 2021-10-26 RX ORDER — ETOMIDATE 2 MG/ML
10 INJECTION INTRAVENOUS ONCE
Status: COMPLETED | OUTPATIENT
Start: 2021-10-26 | End: 2021-10-26

## 2021-10-26 RX ORDER — HALOPERIDOL 5 MG/ML
INJECTION INTRAMUSCULAR
Status: COMPLETED
Start: 2021-10-26 | End: 2021-10-26

## 2021-10-26 RX ORDER — DIPHENHYDRAMINE HYDROCHLORIDE 50 MG/ML
INJECTION INTRAMUSCULAR; INTRAVENOUS
Status: DISPENSED
Start: 2021-10-26 | End: 2021-10-26

## 2021-10-26 RX ORDER — AMOXICILLIN 250 MG
2 CAPSULE ORAL 2 TIMES DAILY
Status: DISCONTINUED | OUTPATIENT
Start: 2021-10-26 | End: 2021-10-28

## 2021-10-26 RX ORDER — IPRATROPIUM BROMIDE AND ALBUTEROL SULFATE 2.5; .5 MG/3ML; MG/3ML
3 SOLUTION RESPIRATORY (INHALATION)
Status: DISCONTINUED | OUTPATIENT
Start: 2021-10-26 | End: 2021-10-31 | Stop reason: HOSPADM

## 2021-10-26 RX ORDER — SODIUM CHLORIDE 9 MG/ML
1000 INJECTION, SOLUTION INTRAVENOUS ONCE
Status: COMPLETED | OUTPATIENT
Start: 2021-10-26 | End: 2021-10-26

## 2021-10-26 RX ORDER — POLYETHYLENE GLYCOL 3350 17 G/17G
1 POWDER, FOR SOLUTION ORAL
Status: DISCONTINUED | OUTPATIENT
Start: 2021-10-26 | End: 2021-10-28

## 2021-10-26 RX ORDER — CLINDAMYCIN PHOSPHATE 600 MG/50ML
600 INJECTION, SOLUTION INTRAVENOUS EVERY 8 HOURS
Status: DISCONTINUED | OUTPATIENT
Start: 2021-10-26 | End: 2021-10-27

## 2021-10-26 RX ORDER — DEXTROSE MONOHYDRATE 25 G/50ML
50 INJECTION, SOLUTION INTRAVENOUS
Status: DISCONTINUED | OUTPATIENT
Start: 2021-10-26 | End: 2021-10-27

## 2021-10-26 RX ORDER — SODIUM CHLORIDE 9 MG/ML
500 INJECTION, SOLUTION INTRAVENOUS ONCE
Status: COMPLETED | OUTPATIENT
Start: 2021-10-26 | End: 2021-10-26

## 2021-10-26 RX ORDER — NALOXONE HYDROCHLORIDE 1 MG/ML
INJECTION INTRAMUSCULAR; INTRAVENOUS; SUBCUTANEOUS
Status: COMPLETED
Start: 2021-10-26 | End: 2021-10-26

## 2021-10-26 RX ORDER — FOLIC ACID 1 MG/1
1 TABLET ORAL ONCE
Status: COMPLETED | OUTPATIENT
Start: 2021-10-26 | End: 2021-10-26

## 2021-10-26 RX ORDER — NALOXONE HYDROCHLORIDE 1 MG/ML
2 INJECTION INTRAMUSCULAR; INTRAVENOUS; SUBCUTANEOUS ONCE
Status: COMPLETED | OUTPATIENT
Start: 2021-10-26 | End: 2021-10-26

## 2021-10-26 RX ORDER — BISACODYL 10 MG
10 SUPPOSITORY, RECTAL RECTAL
Status: DISCONTINUED | OUTPATIENT
Start: 2021-10-26 | End: 2021-10-28

## 2021-10-26 RX ORDER — SODIUM CHLORIDE, SODIUM LACTATE, POTASSIUM CHLORIDE, CALCIUM CHLORIDE 600; 310; 30; 20 MG/100ML; MG/100ML; MG/100ML; MG/100ML
INJECTION, SOLUTION INTRAVENOUS CONTINUOUS
Status: DISCONTINUED | OUTPATIENT
Start: 2021-10-26 | End: 2021-10-27

## 2021-10-26 RX ADMIN — SODIUM CHLORIDE, POTASSIUM CHLORIDE, SODIUM LACTATE AND CALCIUM CHLORIDE: 600; 310; 30; 20 INJECTION, SOLUTION INTRAVENOUS at 07:58

## 2021-10-26 RX ADMIN — PROPOFOL 10 MCG/KG/MIN: 10 INJECTION, EMULSION INTRAVENOUS at 01:18

## 2021-10-26 RX ADMIN — THIAMINE HYDROCHLORIDE: 100 INJECTION, SOLUTION INTRAMUSCULAR; INTRAVENOUS at 20:31

## 2021-10-26 RX ADMIN — Medication 200 MCG/HR: at 15:01

## 2021-10-26 RX ADMIN — SODIUM CHLORIDE, POTASSIUM CHLORIDE, SODIUM LACTATE AND CALCIUM CHLORIDE: 600; 310; 30; 20 INJECTION, SOLUTION INTRAVENOUS at 17:19

## 2021-10-26 RX ADMIN — PROPOFOL 20 MCG/KG/MIN: 10 INJECTION, EMULSION INTRAVENOUS at 17:19

## 2021-10-26 RX ADMIN — PIPERACILLIN AND TAZOBACTAM 4.5 G: 4; .5 INJECTION, POWDER, LYOPHILIZED, FOR SOLUTION INTRAVENOUS; PARENTERAL at 01:53

## 2021-10-26 RX ADMIN — FAMOTIDINE 20 MG: 10 INJECTION INTRAVENOUS at 05:18

## 2021-10-26 RX ADMIN — SODIUM CHLORIDE 500 ML: 9 INJECTION, SOLUTION INTRAVENOUS at 08:45

## 2021-10-26 RX ADMIN — NALOXONE HYDROCHLORIDE 2 MG: 1 INJECTION INTRAMUSCULAR; INTRAVENOUS; SUBCUTANEOUS at 00:53

## 2021-10-26 RX ADMIN — HALOPERIDOL 5 MG: 5 INJECTION INTRAMUSCULAR at 01:04

## 2021-10-26 RX ADMIN — LORAZEPAM 2 MG: 2 INJECTION INTRAMUSCULAR at 01:04

## 2021-10-26 RX ADMIN — IOHEXOL 80 ML: 350 INJECTION, SOLUTION INTRAVENOUS at 02:15

## 2021-10-26 RX ADMIN — DOCUSATE SODIUM 50 MG AND SENNOSIDES 8.6 MG 2 TABLET: 8.6; 5 TABLET, FILM COATED ORAL at 17:18

## 2021-10-26 RX ADMIN — VANCOMYCIN HYDROCHLORIDE 2000 MG: 500 INJECTION, POWDER, LYOPHILIZED, FOR SOLUTION INTRAVENOUS at 03:15

## 2021-10-26 RX ADMIN — DEXTROSE MONOHYDRATE 50 ML: 500 INJECTION PARENTERAL at 17:20

## 2021-10-26 RX ADMIN — DIPHENHYDRAMINE HYDROCHLORIDE 50 MG: 50 INJECTION INTRAMUSCULAR; INTRAVENOUS at 01:03

## 2021-10-26 RX ADMIN — NALOXONE HYDROCHLORIDE 2 MG: 1 INJECTION PARENTERAL at 00:53

## 2021-10-26 RX ADMIN — SODIUM CHLORIDE 1000 ML: 9 INJECTION, SOLUTION INTRAVENOUS at 01:25

## 2021-10-26 RX ADMIN — CLINDAMYCIN IN 5 PERCENT DEXTROSE 600 MG: 12 INJECTION, SOLUTION INTRAVENOUS at 21:00

## 2021-10-26 RX ADMIN — ETOMIDATE 10 MG: 2 INJECTION INTRAVENOUS at 01:07

## 2021-10-26 RX ADMIN — FENTANYL CITRATE 100 MCG: 50 INJECTION INTRAMUSCULAR; INTRAVENOUS at 04:36

## 2021-10-26 RX ADMIN — SUCCINYLCHOLINE CHLORIDE 100 MG: 20 INJECTION, SOLUTION INTRAMUSCULAR; INTRAVENOUS at 01:08

## 2021-10-26 RX ADMIN — HALOPERIDOL LACTATE 5 MG: 5 INJECTION, SOLUTION INTRAMUSCULAR at 01:04

## 2021-10-26 RX ADMIN — LORAZEPAM 2 MG: 2 INJECTION INTRAMUSCULAR; INTRAVENOUS at 01:04

## 2021-10-26 RX ADMIN — FAMOTIDINE 20 MG: 20 TABLET ORAL at 17:19

## 2021-10-26 RX ADMIN — PROPOFOL 20 MCG/KG/MIN: 10 INJECTION, EMULSION INTRAVENOUS at 09:00

## 2021-10-26 RX ADMIN — CLINDAMYCIN IN 5 PERCENT DEXTROSE 600 MG: 12 INJECTION, SOLUTION INTRAVENOUS at 05:18

## 2021-10-26 RX ADMIN — ENOXAPARIN SODIUM 40 MG: 40 INJECTION SUBCUTANEOUS at 13:06

## 2021-10-26 RX ADMIN — CLINDAMYCIN IN 5 PERCENT DEXTROSE 600 MG: 12 INJECTION, SOLUTION INTRAVENOUS at 13:06

## 2021-10-26 RX ADMIN — FOLIC ACID 1 MG: 1 TABLET ORAL at 20:44

## 2021-10-26 RX ADMIN — Medication 100 MCG/HR: at 04:36

## 2021-10-26 ASSESSMENT — COPD QUESTIONNAIRES
HAVE YOU SMOKED AT LEAST 100 CIGARETTES IN YOUR ENTIRE LIFE: YES
DO YOU EVER COUGH UP ANY MUCUS OR PHLEGM?: NO/ONLY WITH OCCASIONAL COLDS OR INFECTIONS
COPD SCREENING SCORE: 2
DURING THE PAST 4 WEEKS HOW MUCH DID YOU FEEL SHORT OF BREATH: NONE/LITTLE OF THE TIME

## 2021-10-26 ASSESSMENT — PAIN DESCRIPTION - PAIN TYPE
TYPE: ACUTE PAIN

## 2021-10-26 ASSESSMENT — FIBROSIS 4 INDEX: FIB4 SCORE: 1.22

## 2021-10-26 NOTE — ED TRIAGE NOTES
"Chief Complaint   Patient presents with   • Dental Pain     \"hole in one of his back molars\", started last night. the pain got progressively worse throughout the day   • Altered Mental Status     pt unable to answer questions appropriately, hunched over and rocking back and forth. girlfriend answering most questions.     Pt's girlfriend states he started to act \"funny\" in the car over to the ER. Denies any drug use when asked. Assessed O2 saturations, above 95%    Hx of HIV      /98   Pulse (!) 101   Temp 36.3 °C (97.3 °F) (Temporal)   Resp (!) 101   Ht 1.88 m (6' 2\")   Wt 83.9 kg (185 lb)   SpO2 93%   BMI 23.75 kg/m²     "

## 2021-10-26 NOTE — RESPIRATORY CARE
Intubation         Pt intubated per ERP 8@25 (teeth). ETT cuff checked, positive color change, b/l bs heard and fogging in tubing. Placed pt on vent.

## 2021-10-26 NOTE — ED PROVIDER NOTES
"ED Provider Note    CHIEF COMPLAINT  Chief Complaint   Patient presents with   • Dental Pain     \"hole in one of his back molars\", started last night. the pain got progressively worse throughout the day   • Altered Mental Status     pt unable to answer questions appropriately, hunched over and rocking back and forth. girlfriend answering most questions.       HPI  Sagrario Berkowitz is a 36 y.o. male who presents for evaluation of altered level of consciousness.  Patient significant other who accompanies him notes that patient started having pain at least 24 hours ago in a molar which has a cavity.  On the car ride in, the patient appeared altered and was stooped over and twitching.  He arrives this way.  He appears to have good muscle tone but is stooped over in the chair and not responding verbally.  Eyes are closed, pupils pinpoint.  He appears to resist a change in body positioning.    REVIEW OF SYSTEMS  Unable to obtain due to clinical condition    PAST FAM HISTORY  Family History   Problem Relation Age of Onset   • Thyroid Mother    • Hypertension Mother    • Hypertension Father        PAST MEDICAL HISTORY   has a past medical history of Infectious disease and Pulmonary embolism (HCC).    SOCIAL HISTORY  Social History     Tobacco Use   • Smoking status: Current Every Day Smoker     Packs/day: 0.50     Types: Cigarettes   • Smokeless tobacco: Former User   • Tobacco comment: ppd   Vaping Use   • Vaping Use: Former   Substance and Sexual Activity   • Alcohol use: Yes     Comment: Occasionally; \"2-3x/month maybe\"   • Drug use: Not Currently     Types: Inhaled     Comment: previously smoked marijauna   • Sexual activity: Not Currently     Partners: Female       SURGICAL HISTORY  patient denies any surgical history    CURRENT MEDICATIONS  Home Medications     Reviewed by Nilton Mahajan (Pharmacy Tech) on 10/26/21 at 0423  Med List Status: Complete   Medication Last Dose Status   abacavir-lamivudine (EPZICOM) " "600-300 MG per tablet unk Active   dolutegravir (TIVICAY) 50 MG Tab tablet unk Active                ALLERGIES  Allergies   Allergen Reactions   • Penicillins        PHYSICAL EXAM  VITAL SIGNS: BP (!) 79/55   Pulse (!) 111   Temp 36.5 °C (97.7 °F) (Temporal)   Resp (!) 24   Ht 1.88 m (6' 2\")   Wt 83.9 kg (185 lb)   SpO2 99%   BMI 23.75 kg/m²    Gen:   HEENT: No signs of trauma, Bilateral external ears normal, Nose normal. Conjunctiva injected, Non-icteric.  Pupils pinpoint, dry oral mucosa, dry cracked lips, gingiva diffusely erythemic and swollen, right posterior third molar appears to be eroded to the gumline.  No facial swelling or induration noted.  Neck: No JVD, supple, No masses  Lymphatic: No cervical lymphadenopathy noted.   Cardiovascular: Tachycardia with regular rhythm, no murmurs.  Capillary refill less than 3 seconds to all extremities, 2+ distal pulses.  Thorax & Lungs: Normal breath sounds, No respiratory distress, No wheezing bilateral chest rise  Abdomen: Bowel sounds normal, Soft,  No masses, No pulsatile masses.   Skin: Warm, Dry, No erythema, No rash noted to exposed areas.   Back: No bony step-offs, deformities, or crepitus.  No ecchymosis, subcutaneous emphysema, crepitus noted to chest wall  Extremities: Intact distal pulses, No edema  Neurologic: GCS 7 based on eyes opening to noxious stimuli, incomprehensible sounds, and flexion to pain.  Intermittently stuporous and combative        LABS  Results for orders placed or performed during the hospital encounter of 10/26/21   CBC WITH DIFFERENTIAL   Result Value Ref Range    WBC 9.1 4.8 - 10.8 K/uL    RBC 5.40 4.70 - 6.10 M/uL    Hemoglobin 16.7 14.0 - 18.0 g/dL    Hematocrit 48.5 42.0 - 52.0 %    MCV 89.8 81.4 - 97.8 fL    MCH 30.9 27.0 - 33.0 pg    MCHC 34.4 33.7 - 35.3 g/dL    RDW 39.7 35.9 - 50.0 fL    Platelet Count 305 164 - 446 K/uL    MPV 9.6 9.0 - 12.9 fL    Neutrophils-Polys 66.00 44.00 - 72.00 %    Lymphocytes 25.00 22.00 - " 41.00 %    Monocytes 7.70 0.00 - 13.40 %    Eosinophils 0.80 0.00 - 6.90 %    Basophils 0.20 0.00 - 1.80 %    Immature Granulocytes 0.30 0.00 - 0.90 %    Nucleated RBC 0.00 /100 WBC    Neutrophils (Absolute) 6.03 1.82 - 7.42 K/uL    Lymphs (Absolute) 2.28 1.00 - 4.80 K/uL    Monos (Absolute) 0.70 0.00 - 0.85 K/uL    Eos (Absolute) 0.07 0.00 - 0.51 K/uL    Baso (Absolute) 0.02 0.00 - 0.12 K/uL    Immature Granulocytes (abs) 0.03 0.00 - 0.11 K/uL    NRBC (Absolute) 0.00 K/uL   COMP METABOLIC PANEL   Result Value Ref Range    Sodium 136 135 - 145 mmol/L    Potassium 4.0 3.6 - 5.5 mmol/L    Chloride 100 96 - 112 mmol/L    Co2 22 20 - 33 mmol/L    Anion Gap 14.0 7.0 - 16.0    Glucose 106 (H) 65 - 99 mg/dL    Bun 11 8 - 22 mg/dL    Creatinine 0.84 0.50 - 1.40 mg/dL    Calcium 10.2 8.5 - 10.5 mg/dL    AST(SGOT) 28 12 - 45 U/L    ALT(SGPT) 11 2 - 50 U/L    Alkaline Phosphatase 94 30 - 99 U/L    Total Bilirubin 0.2 0.1 - 1.5 mg/dL    Albumin 4.5 3.2 - 4.9 g/dL    Total Protein 8.7 (H) 6.0 - 8.2 g/dL    Globulin 4.2 (H) 1.9 - 3.5 g/dL    A-G Ratio 1.1 g/dL   TROPONIN   Result Value Ref Range    Troponin T 7 6 - 19 ng/L   LACTIC ACID   Result Value Ref Range    Lactic Acid 2.0 0.5 - 2.0 mmol/L   PROTHROMBIN TIME (INR)   Result Value Ref Range    PT 12.4 12.0 - 14.6 sec    INR 0.95 0.87 - 1.13   APTT   Result Value Ref Range    APTT 30.8 24.7 - 36.0 sec   T.PALLIDUM AB EIA (Syphilis)   Result Value Ref Range    Syphilis, Treponemal Qual Reactive (A) Non-Reactive   AMMONIA   Result Value Ref Range    Ammonia 16 11 - 45 umol/L   CSF CULTURE    Specimen: Tap; CSF   Result Value Ref Range    Significant Indicator NEG     Source CSF     Site TAP     Culture Result -     Gram Stain Result No organisms seen.    CSF Cell Count   Result Value Ref Range    Number Of Tubes 4     Volume 6.5 mL    Color-Body Fluid Colorless     Character-Body Fluid Clear     Supernatant Appearance Colorless     Total RBC Count 11 cells/uL    Crenated RBC 0  %    Total WBC Count 0 0 - 10 cells/uL    Lymphs 96 %    Mononuclear Cells - CSF 4 %    CSF Tube Number 3    CSF GLUCOSE   Result Value Ref Range    Glucose CSF 58 40 - 80 mg/dL   CSF PROTEIN   Result Value Ref Range    Total Protein, CSF 29 15 - 45 mg/dL   Cryptococcal Antigen, CSF    Specimen: CSF   Result Value Ref Range    Cryptococcal Antigen, CSF Negative Negative   MAGNESIUM   Result Value Ref Range    Magnesium 2.2 1.5 - 2.5 mg/dL   OSMOLALITY SERUM   Result Value Ref Range    Osmolality Serum 332 (H) 278 - 298 mOsm/kg H2O   ESTIMATED GFR   Result Value Ref Range    GFR If African American >60 >60 mL/min/1.73 m 2    GFR If Non African American >60 >60 mL/min/1.73 m 2   RPR (SYPHILIS)   Result Value Ref Range    Rapid Plasma Reagin -Rpr- Non Reactive Non Reactive   URINALYSIS   Result Value Ref Range    Color Yellow     Character Clear     Specific Gravity 1.018 <1.035    Ph 6.0 5.0 - 8.0    Glucose Negative Negative mg/dL    Ketones Negative Negative mg/dL    Protein Negative Negative mg/dL    Bilirubin Negative Negative    Urobilinogen, Urine 0.2 Negative    Nitrite Negative Negative    Leukocyte Esterase Negative Negative    Occult Blood Trace (A) Negative    Micro Urine Req Microscopic    URINE DRUG SCREEN   Result Value Ref Range    Amphetamines Urine Positive (A) Negative    Barbiturates Negative Negative    Benzodiazepines Negative Negative    Cocaine Metabolite Negative Negative    Methadone Negative Negative    Opiates Negative Negative    Oxycodone Negative Negative    Phencyclidine -Pcp Negative Negative    Propoxyphene Negative Negative    Cannabinoid Metab Positive (A) Negative   URINE MICROSCOPIC (W/UA)   Result Value Ref Range    WBC 0-2 (A) /hpf    RBC 0-2 (A) /hpf    Bacteria Negative None /hpf    Epithelial Cells Few /hpf    Epithelial Cells Renal Few /hpf    Hyaline Cast 0-2 /lpf   GRAM STAIN    Specimen: CSF   Result Value Ref Range    Significant Indicator .     Source CSF     Site TAP      Gram Stain Result No organisms seen.    EKG (NOW)   Result Value Ref Range    Report       [facility]    Test Date:  2021-10-26  Pt Name:    EARL MCDONNELL                Department: ER  MRN:        [id number]                      Room:       RD 03  Gender:     Male                         Technician: [id number]  :        1985                   Requested By:RONNY PORRAS  Order #:    [id number]                    Reading MD:    Measurements  Intervals                                Axis  Rate:       96                           P:          81  CT:         140                          QRS:        87  QRSD:       92                           T:          50  QT:         364  QTc:        460    Interpretive Statements  SINUS RHYTHM  RIGHT ATRIAL ABNORMALITY  LATE PRECORDIAL R/S TRANSITION  CONSIDER LEFT VENTRICULAR HYPERTROPHY  Compared to ECG 2018 18:45:06  ST (T wave) deviation no longer present     POCT arterial blood gas device results   Result Value Ref Range    Ph 7.268 (LL) 7.400 - 7.500    Pco2 52.0 (HH) 26.0 - 37.0 mmHg    Po2 118 (H) 64 - 87 mmHg    Tco2 25 20 - 33 mmol/L    S02 98 93 - 99 %    Hco3 23.8 17.0 - 25.0 mmol/L    BE -4 -4 - 3 mmol/L    Body Temp 98.4 F degrees    O2 Therapy 30 %    iPF Ratio 393     Ph Temp Db 7.270 (LL) 7.400 - 7.500    Pco2 Temp Co 51.8 (HH) 26.0 - 37.0 mmHg    Po2 Temp Cor 117 (H) 64 - 87 mmHg    Specimen Arterial     DelSys Vent     Tidal Volume 400 mL    Peep End Expiratory Pressure 8 cmh20    Set Rate 20     Mode APV-CMV    POCT lactate device results   Result Value Ref Range    iStat Lactate 2.8 (H) 0.5 - 2.0 mmol/L   POCT glucose device results   Result Value Ref Range    Glucose - Accu-Ck 108 (H) 65 - 99 mg/dL       RADIOLOGY  CT-HEAD W/O   Final Result         1.  No acute intracranial abnormality.      CT-MAXILLOFACIAL WITH PLUS RECONS   Final Result         1.  Right lateral incisor periapical abscess eroding through the  anterior alveolar bone   2.  Calcification the right optic nerve, appearance suggests meningioma, referral to ophthalmologist for further workup and evaluation recommended.      DX-CHEST-PORTABLE (1 VIEW)   Final Result         1.  No acute cardiopulmonary disease.        Critical Care Note  Upon my evaluation, this patient had high probability of imminent and life-threatening deterioration due to altered level of consciousness and acute respiratory failure, which required my direct attention, intervention, and personal management. I personally provided 90 minutes of critical care time exclusive of time spent on separately billable procedures. Time includes review of laboratory data, radiology results, discussion with consultants, and monitoring for potential decompensation.     Intubation Procedure Note    Indication: impending respiratory failure and airway protection    Consent: Unable to be obtained due to the emergent nature of this procedure.    Medications Used: etomidate intravenously and succinycholine intravenously    Procedure: The patient was placed in the appropriate position with cervical spine immobilization maintained throughout the procedure.  Cricoid pressure was not required.  Intubation was performed by indirect laryngoscopy using a GLIDEscope and an 8.0 cuffed endotracheal tube.  The tube was then secured appropriately at a distance of 25 cm to the dental ridge.  Initial confirmation of placement included bilateral breath sounds, an end tidal CO2 detector, absence of sounds over the stomach, tube fogging, adequate chest rise and adequate pulse oximetry reading.  A chest x-ray to verify correct placement of the tube showed appropriate tube position.    The patient tolerated the procedure well.     Complications: None      Lumbar Puncture Procedure Note    Indication: Suspected meningitis    Consent: Unable to be obtained due to the emergent nature of this procedure.    Procedure: The patient was  placed in the left lateral decubitus position and the appropriate landmarks were identified. The area was prepped and draped in the usual sterile fashion. Anesthesia was obtained using 4 cc of 1% Lidocaine without epinephrine. A spinal needle was inserted at the L3- L4 level with the stylet in place until spinal fluid was returned. Opening pressure was not measured. At this point 4.0 cc of clear cerebral spinal fluid was obtained and sent for appropriate testing. The stylet was then replaced and the needle was withdrawn. A sterile dressing was placed over the site and the patient was placed in the supine position.    The patient tolerated the procedure well.    Complications: None      HYDRATION: Based on the patient's presentation of Dehydration, Inability to take oral fluids and Tachycardia the patient was given IV fluids. IV Hydration was used because oral hydration was not adequate alone. Upon recheck following hydration, the patient was stable.    COURSE & MEDICAL DECISION MAKING  Patient arrives for altered level of consciousness which is associated with pinpoint pupils but there is no history of opiate use.  He does not have the decreased muscle tone typically associated with this issue but we will try Narcan first.  Patient appears to be hemodynamically stable if somewhat tachycardic.  Of particular concern is the patient is an HIV patient and is altered.  Likely if Narcan does not work, he will need intubation for airway protection and to facilitate the diagnostics, including CT and LP.  Patient does not appear to have an extensive alcohol history and I do not feel this is delirium tremens.  There is no sign of sympathetic dysregulation at this time.  12:36 AM  No response to narcan after a few minutes.  12:49 AM  Patient became combative on the gurney was clearly disoriented, had to be put in four-point restraints.  After being placed in restraints patient appeared excessively sedated and did not appear to  be protecting his airway and had no gag reflex.  His oral mucosa appears very dry and is likely he is dehydrated.  1:11 AM  Patient now intubated and will be sedated with propofol.  Vital signs appear very stable.  Will take patient to CT for imaging of the brain and maxillofacial region.  When he returns, LP will be performed.  2:06 AM  Patient to CT.  3:51 AM  Urine drug screen positive for amphetamines and cannabinoids.  Patient noted to have negative RPR but positive treponemal antigen.  This could represent a previous infection or a teriary infection although it seems likely that a toxic encephalopathy due to substance abuse is more likely.  Pending CSF testing should help differentiate.    FINAL IMPRESSION  1. Encephalopathy    2. Acute respiratory failure, unspecified whether with hypoxia or hypercapnia (HCC)    3. Apical alveolar abscess        Electronically signed by: Garett Dietz M.D., 10/26/2021 12:26 AM

## 2021-10-26 NOTE — H&P
History & Physical Note    Date of Admission: 10/26/2021  Admission Status: Inpatient  [facility] Team: [facility] Gold [facility]  Attending: Dr. Lea  Senior Resident: Dr. Williamson    Contact Number: [phone]    Chief Complaint: Dental Pain        History of Present Illness (HPI): Patient is a 36-year-old male with a past medical history of pulmonary embolism in 2017, HIV abacavir lamivudine and dolutegravir who presented to the emergency department with complaints of dental pain in his back molars.  He began experiencing altered mental status and on the way to the hospital.  Pulse 101, afebrile, 157/98 blood pressure on presentation to the hospital, with 93% oxygen on room air.  Emergency department course-CBC, CMP, troponin, lactic acid, urinalysis ammonia -unremarkable.  Blood cultures were drawn.  UDS positive for methamphetamines and cannabis.  Lumbar puncture with CSF studies performed including cell count, culture, glucose, protein,Cryptococcal antigen, serum osmolality 332, ABG-pH 7.268, CO2 52, lactate 2.8.  Treponemal antibody ordered and pending.  CT maxillofacial: Right lateral incisor periapical abscess eroding through the anterior alveolar bone; Calcification the right optic nerve, appearance suggests meningioma, referral to ophthalmologist for further workup and evaluation recommended; CT head without contrast- negative.  Patient was combative with emergency department staff and was sedated/intubated prior to encounter.    Review of Systems: Review of Systems   Unable to perform ROS: Acuity of condition        Past Medical History:    has a past medical history of Infectious disease and Pulmonary embolism (HCC).    Past Surgical History:  has no past surgical history on file.    Medications: Prior to Admission Medications   Prescriptions Last Dose Informant Patient Reported? Taking?   abacavir-lamivudine (EPZICOM) 600-300 MG per tablet   No No   Sig: Take 1 tablet by mouth every morning   dolutegravir (TIVICAY)  50 MG Tab tablet   No No   Sig: Take 1 tablet by mouth every morning.      Facility-Administered Medications: None        Allergies:   Allergies   Allergen Reactions   • Penicillins        Family History:   family history includes Hypertension in his father and mother; Thyroid in his mother.     Social History: Unable to obtain, patient intubated and sedated at time of encounter    Primary Care Provider:  No primary care provider on file.      Vitals:  Temp:  [36.3 °C (97.3 °F)] 36.3 °C (97.3 °F)  Pulse:  [] 97  Resp:  [14-33] 24  BP: (104-157)/(58-98) 125/81  SpO2:  [66 %-100 %] 100 %    Physical Exam  Constitutional:       Appearance: Normal appearance.   HENT:      Head: Normocephalic and atraumatic.      Comments: Dry mucous membranes       Right Ear: Tympanic membrane normal.      Left Ear: Tympanic membrane normal.      Nose: Nose normal.      Mouth/Throat:      Mouth: Mucous membranes are moist.      Pharynx: Oropharynx is clear.   Eyes:      Extraocular Movements: Extraocular movements intact.      Conjunctiva/sclera: Conjunctivae normal.      Pupils: Pupils are equal, round, and reactive to light.   Cardiovascular:      Rate and Rhythm: Tachycardia present.      Heart sounds: No murmur heard.   No gallop.    Pulmonary:      Effort: No respiratory distress.      Breath sounds: No stridor. No wheezing.      Comments: Pt intubated  Abdominal:      General: There is no distension.      Tenderness: There is no abdominal tenderness. There is no rebound.   Musculoskeletal:         General: No deformity or signs of injury.      Cervical back: No rigidity. No muscular tenderness.   Skin:     Coloration: Skin is not jaundiced or pale.      Comments: Skin on feet peeling and dry   Neurological:      Mental Status: He is alert. He is disoriented.      Motor: No weakness.      Coordination: Coordination normal.         Labs:   Results for orders placed or performed during the hospital encounter of 10/26/21   CBC  WITH DIFFERENTIAL   Result Value Ref Range    WBC 9.1 4.8 - 10.8 K/uL    RBC 5.40 4.70 - 6.10 M/uL    Hemoglobin 16.7 14.0 - 18.0 g/dL    Hematocrit 48.5 42.0 - 52.0 %    MCV 89.8 81.4 - 97.8 fL    MCH 30.9 27.0 - 33.0 pg    MCHC 34.4 33.7 - 35.3 g/dL    RDW 39.7 35.9 - 50.0 fL    Platelet Count 305 164 - 446 K/uL    MPV 9.6 9.0 - 12.9 fL    Neutrophils-Polys 66.00 44.00 - 72.00 %    Lymphocytes 25.00 22.00 - 41.00 %    Monocytes 7.70 0.00 - 13.40 %    Eosinophils 0.80 0.00 - 6.90 %    Basophils 0.20 0.00 - 1.80 %    Immature Granulocytes 0.30 0.00 - 0.90 %    Nucleated RBC 0.00 /100 WBC    Neutrophils (Absolute) 6.03 1.82 - 7.42 K/uL    Lymphs (Absolute) 2.28 1.00 - 4.80 K/uL    Monos (Absolute) 0.70 0.00 - 0.85 K/uL    Eos (Absolute) 0.07 0.00 - 0.51 K/uL    Baso (Absolute) 0.02 0.00 - 0.12 K/uL    Immature Granulocytes (abs) 0.03 0.00 - 0.11 K/uL    NRBC (Absolute) 0.00 K/uL   COMP METABOLIC PANEL   Result Value Ref Range    Sodium 136 135 - 145 mmol/L    Potassium 4.0 3.6 - 5.5 mmol/L    Chloride 100 96 - 112 mmol/L    Co2 22 20 - 33 mmol/L    Anion Gap 14.0 7.0 - 16.0    Glucose 106 (H) 65 - 99 mg/dL    Bun 11 8 - 22 mg/dL    Creatinine 0.84 0.50 - 1.40 mg/dL    Calcium 10.2 8.5 - 10.5 mg/dL    AST(SGOT) 28 12 - 45 U/L    ALT(SGPT) 11 2 - 50 U/L    Alkaline Phosphatase 94 30 - 99 U/L    Total Bilirubin 0.2 0.1 - 1.5 mg/dL    Albumin 4.5 3.2 - 4.9 g/dL    Total Protein 8.7 (H) 6.0 - 8.2 g/dL    Globulin 4.2 (H) 1.9 - 3.5 g/dL    A-G Ratio 1.1 g/dL   TROPONIN   Result Value Ref Range    Troponin T 7 6 - 19 ng/L   LACTIC ACID   Result Value Ref Range    Lactic Acid 2.0 0.5 - 2.0 mmol/L   PROTHROMBIN TIME (INR)   Result Value Ref Range    PT 12.4 12.0 - 14.6 sec    INR 0.95 0.87 - 1.13   APTT   Result Value Ref Range    APTT 30.8 24.7 - 36.0 sec   T.PALLIDUM AB EIA (Syphilis)   Result Value Ref Range    Syphilis, Treponemal Qual Reactive (A) Non-Reactive   AMMONIA   Result Value Ref Range    Ammonia 16 11 - 45  umol/L   CSF Cell Count   Result Value Ref Range    Number Of Tubes 4     Volume 6.5 mL    Color-Body Fluid Colorless     Character-Body Fluid Clear     Supernatant Appearance Colorless     Total RBC Count 11 cells/uL    Crenated RBC 0 %    Total WBC Count 0 0 - 10 cells/uL    Lymphs 96 %    Mononuclear Cells - CSF 4 %    CSF Tube Number 3    CSF GLUCOSE   Result Value Ref Range    Glucose CSF 58 40 - 80 mg/dL   CSF PROTEIN   Result Value Ref Range    Total Protein, CSF 29 15 - 45 mg/dL   MAGNESIUM   Result Value Ref Range    Magnesium 2.2 1.5 - 2.5 mg/dL   OSMOLALITY SERUM   Result Value Ref Range    Osmolality Serum 332 (H) 278 - 298 mOsm/kg H2O   ESTIMATED GFR   Result Value Ref Range    GFR If African American >60 >60 mL/min/1.73 m 2    GFR If Non African American >60 >60 mL/min/1.73 m 2   RPR (SYPHILIS)   Result Value Ref Range    Rapid Plasma Reagin -Rpr- Non Reactive Non Reactive   URINALYSIS   Result Value Ref Range    Color Yellow     Character Clear     Specific Gravity 1.018 <1.035    Ph 6.0 5.0 - 8.0    Glucose Negative Negative mg/dL    Ketones Negative Negative mg/dL    Protein Negative Negative mg/dL    Bilirubin Negative Negative    Urobilinogen, Urine 0.2 Negative    Nitrite Negative Negative    Leukocyte Esterase Negative Negative    Occult Blood Trace (A) Negative    Micro Urine Req Microscopic    URINE DRUG SCREEN   Result Value Ref Range    Amphetamines Urine Positive (A) Negative    Barbiturates Negative Negative    Benzodiazepines Negative Negative    Cocaine Metabolite Negative Negative    Methadone Negative Negative    Opiates Negative Negative    Oxycodone Negative Negative    Phencyclidine -Pcp Negative Negative    Propoxyphene Negative Negative    Cannabinoid Metab Positive (A) Negative   URINE MICROSCOPIC (W/UA)   Result Value Ref Range    WBC 0-2 (A) /hpf    RBC 0-2 (A) /hpf    Bacteria Negative None /hpf    Epithelial Cells Few /hpf    Epithelial Cells Renal Few /hpf    Hyaline Cast  0-2 /lpf   EKG (NOW)   Result Value Ref Range    Report       [facility]    Test Date:  2021-10-26  Pt Name:    EARL MCDONNELL                Department: ER  MRN:        [id number]                      Room:       RD 03  Gender:     Male                         Technician: [id number]  :        1985                   Requested By:RONNY PORRAS  Order #:    [id number]                    Reading MD:    Measurements  Intervals                                Axis  Rate:       96                           P:          81  OR:         140                          QRS:        87  QRSD:       92                           T:          50  QT:         364  QTc:        460    Interpretive Statements  SINUS RHYTHM  RIGHT ATRIAL ABNORMALITY  LATE PRECORDIAL R/S TRANSITION  CONSIDER LEFT VENTRICULAR HYPERTROPHY  Compared to ECG 2018 18:45:06  ST (T wave) deviation no longer present     POCT arterial blood gas device results   Result Value Ref Range    Ph 7.268 (LL) 7.400 - 7.500    Pco2 52.0 (HH) 26.0 - 37.0 mmHg    Po2 118 (H) 64 - 87 mmHg    Tco2 25 20 - 33 mmol/L    S02 98 93 - 99 %    Hco3 23.8 17.0 - 25.0 mmol/L    BE -4 -4 - 3 mmol/L    Body Temp 98.4 F degrees    O2 Therapy 30 %    iPF Ratio 393     Ph Temp Db 7.270 (LL) 7.400 - 7.500    Pco2 Temp Co 51.8 (HH) 26.0 - 37.0 mmHg    Po2 Temp Cor 117 (H) 64 - 87 mmHg    Specimen Arterial     DelSys Vent     Tidal Volume 400 mL    Peep End Expiratory Pressure 8 cmh20    Set Rate 20     Mode APV-CMV    POCT lactate device results   Result Value Ref Range    iStat Lactate 2.8 (H) 0.5 - 2.0 mmol/L        EKG:   Results for orders placed or performed during the hospital encounter of 10/26/21   EKG (NOW)   Result Value Ref Range    Report       [facility]    Test Date:  2021-10-26  Pt Name:    EARL MCDONNELL                Department: ER  MRN:        [id number]                      Room:       RD 03  Gender:      Male                         Technician: [id number]  :        1985                   Requested By:RONNY PORRAS  Order #:    [id number]                    Reading MD:    Measurements  Intervals                                Axis  Rate:       96                           P:          81  OH:         140                          QRS:        87  QRSD:       92                           T:          50  QT:         364  QTc:        460    Interpretive Statements  SINUS RHYTHM  RIGHT ATRIAL ABNORMALITY  LATE PRECORDIAL R/S TRANSITION  CONSIDER LEFT VENTRICULAR HYPERTROPHY  Compared to ECG 2018 18:45:06  ST (T wave) deviation no longer present          Imaging:   CT-HEAD W/O   Final Result         1.  No acute intracranial abnormality.      CT-MAXILLOFACIAL WITH PLUS RECONS   Final Result         1.  Right lateral incisor periapical abscess eroding through the anterior alveolar bone   2.  Calcification the right optic nerve, appearance suggests meningioma, referral to ophthalmologist for further workup and evaluation recommended.      DX-CHEST-PORTABLE (1 VIEW)   Final Result         1.  No acute cardiopulmonary disease.      DX-CHEST-PORTABLE (1 VIEW)    (Results Pending)        Previous Data Review: Reviewed    Assessment and Plan  Altered mental status  DDx:  Metabolic,medication , structural, psych,infection, illicit substance, etc  CBC, CMP, troponin, lactic acid, urinalysis ammonia -unremarkable.   Serum osmolality 332, ABG-pH 7.268, CO2 52, lactate 2.8.    UDS positive for methamphetamines and cannabis.   CT maxillofacial: Right lateral incisor periapical abscess eroding through the anterior alveolar bone; Calcification the right optic nerve, appearance suggests meningioma, referral to ophthalmologist for further workup and evaluation recommended; CT head without contrast- negative.  Follow up results of lumbar puncture with CSF studies , including cell count, culture, glucose, protein,Cryptococcal  antigen, reponemal antibody ordered and pending.    Follow up blood cultures    Follow up cxr  Abx for dental abscess (clinda ordered as has penicillin allergy per chart and due to his mental status he is unable to clarify the allergy)  Neuro and ophthalmology outpatient follow up  for findings noted above in CT maxillofacial     Dental abscess  CT maxillofacial: Right lateral incisor periapical abscess eroding through the anterior alveolar bone  Abx as above  Follow up bcx  CTM CBC , prn tylenol should he develop a fever    Acute respiratory failure with hypoxia  Intubated in ED  Titrate sedation, neurochecks per protocol  ABCDEF bundle, liberate when clinically appropriate  Planned SAT/SBT    HIV/AIDS  Continue home regime    Methamphetamine use  Monitor for signs of w/d

## 2021-10-26 NOTE — PROGRESS NOTES
Med Rec completed: per phone call to pt's home pharmacy and historical review.     Pt's home pharmacy has no active medication dispensed since April, 2021.    Historical review elicits Epzicom and Tivicay dispensed from [facility] on 1/7/2021. - Last dosing information on these drugs is not available, as pt is intubated, and friend who came in with patient is unaware of medication regimens.     No reported ORAL antibiotics in last 30 days    Preferred [facility]: [facility]     Pt's pharmacy confirmed following allergies:  Allergies   Allergen Reactions   • Penicillins       Pt's home medications:   Medication Sig   • abacavir-lamivudine (EPZICOM) 600-300 MG per tablet Take 1 tablet by mouth every morning   • dolutegravir (TIVICAY) 50 MG Tab tablet Take 1 tablet by mouth every morning.

## 2021-10-26 NOTE — PROGRESS NOTES
Pt to [facility] [address] with ACLS RN, CCT, and RT, placed on ICU monitor and bed,     4 Eyes Skin Assessment Completed by Janel RN and NELY Fritz.    Head WDL  Ears WDL  Nose Redness  Mouth cracked front right tooth,   Neck WDL  Breast/Chest WDL  Shoulder Blades WDL  Spine WDL  (R) Arm/Elbow/Hand WDL  (L) Arm/Elbow/Hand WDL  Abdomen WDL  Groin Incision  Scrotum/Coccyx/Buttocks WDL  (R) Leg WDL  (L) Leg WDL  (R) Heel/Foot/Toe WDL  (L) Heel/Foot/Toe WDL    Generalized dusky skin, scattered scarring throughout            Interventions In Place Heel Mepilex, Pillows and Q2 Turns    Possible Skin Injury No    Pictures Uploaded Into Epic N/A  Wound Consult Placed N/A  RN Wound Prevention Protocol Ordered No

## 2021-10-26 NOTE — PROGRESS NOTES
RCC    Sedate on ventilator  Propofol  Severely agitated on arrival, fentanyl initiated  Hemodynamics noted, no pressors  Not on IV fluids  Lungs are clear  Full vent support  99% sat on FiO2 0.3  CT head negative  LP appears negative  UDS positive for amphetamines and THC  RPR negative but syphilis treponemal Qual positive  Clindamycin for dental abscess ongoing  Cultures pending  History of HIV positivity - outpatient regimen?    Ready transition to dexmedetomidine off propofol and titrate fentanyl  ID consultation: Antibiotics for tooth, antibiotics for syphilis, penicillin allergy, HIV regimen???    Case reviewed with Dr. Up who I believe [facility].  We will continue clindamycin for now with his penicillin allergy.  We agreed resuming his HIV meds was appropriate.  No treatment for his negative RPR but positive treponemal study, he thinks he was treated in the past and he will track down records at [facility] hopefully.    Agitated with limited SAT, still not quite ready for SBT/sedation, query withdrawals    Assessment  Encephalopathy/agitated delirium  Negative CT head and negative LP  Dental abscess query osteo  Positive drug screen for amphetamines  Respiratory failure, primarily iatrogenic, exuberant response to B-52 in the ER  HIV  Positive syphilis treponemal study, negative RPR    Recommendations  Continue serial SATs monitoring for readiness for SBT/extubation  Transition from propofol to dexmedetomidine when clinically appropriate  Continue clindamycin  Start tube feeds  Resume home HIV regimen  Consult ID, I spoke with Dr. Up will who will see the patient    D/w RN, RT, Pharmacy and Charge RN  The patient remains critically ill.  Critical care time = 45 minutes in directly providing and coordinating critical care and extensive data review.  No time overlap and excludes procedures.    Addendum:  Further history from family-patient drinks a half a gallon of water per  alcoholic get every day  Likely stopped all HIV meds last April  No recent CD4 count viral load studies  Rally bag and then enteral Rally fights  May need dexmedetomidine/Ativan if full-blown DTs develop-doing okay on propofol at this time  Review with Dr. Up when available re: follow-up HIV studies including CD4 counts and viral load studies

## 2021-10-27 ENCOUNTER — APPOINTMENT (OUTPATIENT)
Dept: RADIOLOGY | Facility: MEDICAL CENTER | Age: 36
DRG: 917 | End: 2021-10-27
Attending: INTERNAL MEDICINE
Payer: MEDICAID

## 2021-10-27 PROBLEM — D32.9 MENINGIOMA (HCC): Status: ACTIVE | Noted: 2021-10-27

## 2021-10-27 PROBLEM — K04.7 DENTAL ABSCESS: Status: ACTIVE | Noted: 2021-10-27

## 2021-10-27 PROBLEM — E16.2 HYPOGLYCEMIA: Status: ACTIVE | Noted: 2021-10-27

## 2021-10-27 PROBLEM — J96.00 ACUTE RESPIRATORY FAILURE (HCC): Status: ACTIVE | Noted: 2021-10-27

## 2021-10-27 PROBLEM — D69.6 THROMBOCYTOPENIA (HCC): Status: ACTIVE | Noted: 2021-10-27

## 2021-10-27 LAB
ANION GAP SERPL CALC-SCNC: 10 MMOL/L (ref 7–16)
BASOPHILS # BLD AUTO: 0.1 % (ref 0–1.8)
BASOPHILS # BLD: 0.01 K/UL (ref 0–0.12)
BUN SERPL-MCNC: 12 MG/DL (ref 8–22)
CALCIUM SERPL-MCNC: 8.4 MG/DL (ref 8.5–10.5)
CHLORIDE SERPL-SCNC: 102 MMOL/L (ref 96–112)
CO2 SERPL-SCNC: 22 MMOL/L (ref 20–33)
CREAT SERPL-MCNC: 0.77 MG/DL (ref 0.5–1.4)
CRP SERPL HS-MCNC: 5.13 MG/DL (ref 0–0.75)
EOSINOPHIL # BLD AUTO: 0.05 K/UL (ref 0–0.51)
EOSINOPHIL NFR BLD: 0.7 % (ref 0–6.9)
ERYTHROCYTE [DISTWIDTH] IN BLOOD BY AUTOMATED COUNT: 43.2 FL (ref 35.9–50)
GLUCOSE BLD-MCNC: 42 MG/DL (ref 65–99)
GLUCOSE BLD-MCNC: 52 MG/DL (ref 65–99)
GLUCOSE BLD-MCNC: 84 MG/DL (ref 65–99)
GLUCOSE BLD-MCNC: 99 MG/DL (ref 65–99)
GLUCOSE SERPL-MCNC: 89 MG/DL (ref 65–99)
HCT VFR BLD AUTO: 40.7 % (ref 42–52)
HGB BLD-MCNC: 13.7 G/DL (ref 14–18)
IMM GRANULOCYTES # BLD AUTO: 0.04 K/UL (ref 0–0.11)
IMM GRANULOCYTES NFR BLD AUTO: 0.6 % (ref 0–0.9)
LYMPHOCYTES # BLD AUTO: 1.38 K/UL (ref 1–4.8)
LYMPHOCYTES NFR BLD: 19.3 % (ref 22–41)
MAGNESIUM SERPL-MCNC: 1.8 MG/DL (ref 1.5–2.5)
MCH RBC QN AUTO: 31.4 PG (ref 27–33)
MCHC RBC AUTO-ENTMCNC: 33.7 G/DL (ref 33.7–35.3)
MCV RBC AUTO: 93.3 FL (ref 81.4–97.8)
MONOCYTES # BLD AUTO: 0.5 K/UL (ref 0–0.85)
MONOCYTES NFR BLD AUTO: 7 % (ref 0–13.4)
NEUTROPHILS # BLD AUTO: 5.17 K/UL (ref 1.82–7.42)
NEUTROPHILS NFR BLD: 72.3 % (ref 44–72)
NRBC # BLD AUTO: 0 K/UL
NRBC BLD-RTO: 0 /100 WBC
PHOSPHATE SERPL-MCNC: 3.4 MG/DL (ref 2.5–4.5)
PLATELET # BLD AUTO: 150 K/UL (ref 164–446)
PMV BLD AUTO: 10 FL (ref 9–12.9)
POTASSIUM SERPL-SCNC: 4.3 MMOL/L (ref 3.6–5.5)
PREALB SERPL-MCNC: 21.6 MG/DL (ref 18–38)
PREALB SERPL-MCNC: 22.2 MG/DL (ref 18–38)
RBC # BLD AUTO: 4.36 M/UL (ref 4.7–6.1)
SODIUM SERPL-SCNC: 134 MMOL/L (ref 135–145)
T PALLIDUM AB SER QL AGGL: REACTIVE
WBC # BLD AUTO: 7.2 K/UL (ref 4.8–10.8)

## 2021-10-27 PROCEDURE — 83735 ASSAY OF MAGNESIUM: CPT

## 2021-10-27 PROCEDURE — 86140 C-REACTIVE PROTEIN: CPT

## 2021-10-27 PROCEDURE — 71045 X-RAY EXAM CHEST 1 VIEW: CPT

## 2021-10-27 PROCEDURE — 700102 HCHG RX REV CODE 250 W/ 637 OVERRIDE(OP): Performed by: INTERNAL MEDICINE

## 2021-10-27 PROCEDURE — 84134 ASSAY OF PREALBUMIN: CPT

## 2021-10-27 PROCEDURE — 94003 VENT MGMT INPAT SUBQ DAY: CPT

## 2021-10-27 PROCEDURE — A9270 NON-COVERED ITEM OR SERVICE: HCPCS | Performed by: INTERNAL MEDICINE

## 2021-10-27 PROCEDURE — 85025 COMPLETE CBC W/AUTO DIFF WBC: CPT

## 2021-10-27 PROCEDURE — 82962 GLUCOSE BLOOD TEST: CPT | Mod: 91

## 2021-10-27 PROCEDURE — 86361 T CELL ABSOLUTE COUNT: CPT

## 2021-10-27 PROCEDURE — 80048 BASIC METABOLIC PNL TOTAL CA: CPT

## 2021-10-27 PROCEDURE — 700101 HCHG RX REV CODE 250: Performed by: INTERNAL MEDICINE

## 2021-10-27 PROCEDURE — 700105 HCHG RX REV CODE 258: Performed by: INTERNAL MEDICINE

## 2021-10-27 PROCEDURE — 700101 HCHG RX REV CODE 250: Performed by: STUDENT IN AN ORGANIZED HEALTH CARE EDUCATION/TRAINING PROGRAM

## 2021-10-27 PROCEDURE — 700111 HCHG RX REV CODE 636 W/ 250 OVERRIDE (IP): Performed by: INTERNAL MEDICINE

## 2021-10-27 PROCEDURE — 84100 ASSAY OF PHOSPHORUS: CPT

## 2021-10-27 PROCEDURE — 770022 HCHG ROOM/CARE - ICU (200)

## 2021-10-27 PROCEDURE — 99291 CRITICAL CARE FIRST HOUR: CPT | Performed by: INTERNAL MEDICINE

## 2021-10-27 PROCEDURE — 99254 IP/OBS CNSLTJ NEW/EST MOD 60: CPT | Performed by: INTERNAL MEDICINE

## 2021-10-27 PROCEDURE — 94150 VITAL CAPACITY TEST: CPT

## 2021-10-27 RX ORDER — GAUZE BANDAGE 2" X 2"
100 BANDAGE TOPICAL DAILY
Status: DISCONTINUED | OUTPATIENT
Start: 2021-10-27 | End: 2021-10-28

## 2021-10-27 RX ORDER — ACETAMINOPHEN 500 MG
500 TABLET ORAL EVERY 6 HOURS PRN
Status: DISCONTINUED | OUTPATIENT
Start: 2021-10-27 | End: 2021-10-30

## 2021-10-27 RX ORDER — ACETAMINOPHEN 500 MG
500 TABLET ORAL EVERY 6 HOURS PRN
Status: DISCONTINUED | OUTPATIENT
Start: 2021-10-27 | End: 2021-10-27

## 2021-10-27 RX ORDER — GAUZE BANDAGE 2" X 2"
100 BANDAGE TOPICAL DAILY
Status: DISCONTINUED | OUTPATIENT
Start: 2021-10-27 | End: 2021-10-27

## 2021-10-27 RX ORDER — FOLIC ACID 1 MG/1
1 TABLET ORAL DAILY
Status: DISCONTINUED | OUTPATIENT
Start: 2021-10-27 | End: 2021-10-27

## 2021-10-27 RX ORDER — FOLIC ACID 1 MG/1
1 TABLET ORAL DAILY
Status: DISCONTINUED | OUTPATIENT
Start: 2021-10-27 | End: 2021-10-28

## 2021-10-27 RX ORDER — OXYCODONE HYDROCHLORIDE 5 MG/1
5 TABLET ORAL EVERY 6 HOURS PRN
Status: DISCONTINUED | OUTPATIENT
Start: 2021-10-27 | End: 2021-10-31 | Stop reason: HOSPADM

## 2021-10-27 RX ORDER — DEXMEDETOMIDINE HYDROCHLORIDE 4 UG/ML
.1-1.5 INJECTION, SOLUTION INTRAVENOUS CONTINUOUS
Status: DISCONTINUED | OUTPATIENT
Start: 2021-10-27 | End: 2021-10-29

## 2021-10-27 RX ORDER — LORAZEPAM 2 MG/ML
1-2 INJECTION INTRAMUSCULAR
Status: DISCONTINUED | OUTPATIENT
Start: 2021-10-27 | End: 2021-10-31 | Stop reason: HOSPADM

## 2021-10-27 RX ORDER — DEXTROSE, SODIUM CHLORIDE, SODIUM LACTATE, POTASSIUM CHLORIDE, AND CALCIUM CHLORIDE 5; .6; .31; .03; .02 G/100ML; G/100ML; G/100ML; G/100ML; G/100ML
INJECTION, SOLUTION INTRAVENOUS CONTINUOUS
Status: DISCONTINUED | OUTPATIENT
Start: 2021-10-27 | End: 2021-10-29

## 2021-10-27 RX ORDER — DEXTROSE MONOHYDRATE 25 G/50ML
50 INJECTION, SOLUTION INTRAVENOUS
Status: DISCONTINUED | OUTPATIENT
Start: 2021-10-27 | End: 2021-10-28

## 2021-10-27 RX ORDER — MAGNESIUM SULFATE HEPTAHYDRATE 40 MG/ML
2 INJECTION, SOLUTION INTRAVENOUS ONCE
Status: COMPLETED | OUTPATIENT
Start: 2021-10-27 | End: 2021-10-27

## 2021-10-27 RX ORDER — DEXMEDETOMIDINE HYDROCHLORIDE 4 UG/ML
.1-1.5 INJECTION, SOLUTION INTRAVENOUS CONTINUOUS
Status: DISCONTINUED | OUTPATIENT
Start: 2021-10-27 | End: 2021-10-27

## 2021-10-27 RX ORDER — LORAZEPAM 2 MG/ML
1 INJECTION INTRAMUSCULAR EVERY 4 HOURS
Status: DISCONTINUED | OUTPATIENT
Start: 2021-10-27 | End: 2021-10-28

## 2021-10-27 RX ADMIN — DEXTROSE MONOHYDRATE 50 ML: 500 INJECTION PARENTERAL at 07:50

## 2021-10-27 RX ADMIN — Medication 100 MCG/HR: at 18:03

## 2021-10-27 RX ADMIN — Medication 100 MG: at 10:13

## 2021-10-27 RX ADMIN — DOCUSATE SODIUM 50 MG AND SENNOSIDES 8.6 MG 2 TABLET: 8.6; 5 TABLET, FILM COATED ORAL at 04:37

## 2021-10-27 RX ADMIN — ACETAMINOPHEN 500 MG: 500 TABLET ORAL at 20:39

## 2021-10-27 RX ADMIN — THERA TABS 1 TABLET: TAB at 10:13

## 2021-10-27 RX ADMIN — PROPOFOL 30 MCG/KG/MIN: 10 INJECTION, EMULSION INTRAVENOUS at 11:20

## 2021-10-27 RX ADMIN — AMPICILLIN SODIUM AND SULBACTAM SODIUM 3 G: 2; 1 INJECTION, POWDER, FOR SOLUTION INTRAMUSCULAR; INTRAVENOUS at 17:51

## 2021-10-27 RX ADMIN — AMPICILLIN SODIUM AND SULBACTAM SODIUM 3 G: 2; 1 INJECTION, POWDER, FOR SOLUTION INTRAMUSCULAR; INTRAVENOUS at 11:04

## 2021-10-27 RX ADMIN — PROPOFOL 30 MCG/KG/MIN: 10 INJECTION, EMULSION INTRAVENOUS at 04:36

## 2021-10-27 RX ADMIN — LORAZEPAM 1 MG: 2 INJECTION INTRAMUSCULAR; INTRAVENOUS at 21:40

## 2021-10-27 RX ADMIN — PROPOFOL 25 MCG/KG/MIN: 10 INJECTION, EMULSION INTRAVENOUS at 00:30

## 2021-10-27 RX ADMIN — FAMOTIDINE 20 MG: 20 TABLET ORAL at 17:51

## 2021-10-27 RX ADMIN — SODIUM CHLORIDE, POTASSIUM CHLORIDE, SODIUM LACTATE AND CALCIUM CHLORIDE: 600; 310; 30; 20 INJECTION, SOLUTION INTRAVENOUS at 03:17

## 2021-10-27 RX ADMIN — DEXMEDETOMIDINE 1 MCG/KG/HR: 200 INJECTION, SOLUTION INTRAVENOUS at 18:08

## 2021-10-27 RX ADMIN — DEXMEDETOMIDINE 1.2 MCG/KG/HR: 200 INJECTION, SOLUTION INTRAVENOUS at 23:00

## 2021-10-27 RX ADMIN — FAMOTIDINE 20 MG: 20 TABLET ORAL at 04:37

## 2021-10-27 RX ADMIN — MAGNESIUM SULFATE HEPTAHYDRATE 2 G: 40 INJECTION, SOLUTION INTRAVENOUS at 08:03

## 2021-10-27 RX ADMIN — Medication 200 MCG/HR: at 03:19

## 2021-10-27 RX ADMIN — ACETAMINOPHEN 500 MG: 500 TABLET ORAL at 12:16

## 2021-10-27 RX ADMIN — LORAZEPAM 1 MG: 2 INJECTION INTRAMUSCULAR; INTRAVENOUS at 17:51

## 2021-10-27 RX ADMIN — DOCUSATE SODIUM 50 MG AND SENNOSIDES 8.6 MG 2 TABLET: 8.6; 5 TABLET, FILM COATED ORAL at 17:51

## 2021-10-27 RX ADMIN — DEXMEDETOMIDINE 0.5 MCG/KG/HR: 200 INJECTION, SOLUTION INTRAVENOUS at 11:54

## 2021-10-27 RX ADMIN — ENOXAPARIN SODIUM 40 MG: 40 INJECTION SUBCUTANEOUS at 04:36

## 2021-10-27 RX ADMIN — AMPICILLIN SODIUM AND SULBACTAM SODIUM 3 G: 2; 1 INJECTION, POWDER, FOR SOLUTION INTRAMUSCULAR; INTRAVENOUS at 23:34

## 2021-10-27 RX ADMIN — CLINDAMYCIN IN 5 PERCENT DEXTROSE 600 MG: 12 INJECTION, SOLUTION INTRAVENOUS at 05:00

## 2021-10-27 RX ADMIN — FOLIC ACID 1 MG: 1 TABLET ORAL at 10:13

## 2021-10-27 RX ADMIN — DEXTROSE MONOHYDRATE 50 ML: 500 INJECTION PARENTERAL at 04:54

## 2021-10-27 RX ADMIN — SODIUM CHLORIDE, SODIUM LACTATE, POTASSIUM CHLORIDE, CALCIUM CHLORIDE AND DEXTROSE MONOHYDRATE: 5; 600; 310; 30; 20 INJECTION, SOLUTION INTRAVENOUS at 10:10

## 2021-10-27 ASSESSMENT — PULMONARY FUNCTION TESTS: FVC: 1.5

## 2021-10-27 ASSESSMENT — PAIN DESCRIPTION - PAIN TYPE: TYPE: ACUTE PAIN

## 2021-10-27 NOTE — ASSESSMENT & PLAN NOTE
Severe agitation requiring sedation unfortunately prompted intubation to protect airway-intubated in ER 10/26  Extubated 10/27  RT protocols  Continue incentive spirometry  Mobilize as tolerated

## 2021-10-27 NOTE — ASSESSMENT & PLAN NOTE
Mild, transient-no recurrence, off IV fluids  Hypoglycemia protocols  Query malnutrition  No clear medication etiology on review with clinical pharmacist  Intravenous dextrose/nutrition seem to have resolved this problem for now, continue to monitor

## 2021-10-27 NOTE — CONSULTS
DATE OF SERVICE:  10/27/2021     INFECTIOUS DISEASE CONSULTATION     REQUESTING PHYSICIAN: Seen this morning at the request of Chiki Burton MD.     IDENTIFICATION:  A 36-year-old HIV infected patient seen for review of his HIV   care and oral abscess.     HISTORY OF PRESENT ILLNESS:  Our [facility]  records are  very skimpy on him.  He was seen a few times  in   2016 by the [facility] and has not been seen since.  At that time, he came   in actually for a tooth infection for which he was treated with clindamycin   t.i.d.  He was also given some antibiotics for bronchitis.  He   was also already on Triumeq, which is a 1 tablet regimen with 3 medicines in it   for HIV.  He did have labs drawn at that time and his CD4   count was 637 and his HIV RNA was undetectable.  There was no STI treatment or evaluation at that time,   so we do not have any  RPR at all on record for him. He did not come back after that, there was attempt to reconnect with him earlier this year, but it turned out he was in senior care and so we have no further records on him.  He now   presents with some altered mental status and was brought in, was agitated and   evaluation so far has shown an dental abscess in   his front teeth for which he has been placed on clindamycin.  He required   intubation because he was extremely agitated and he has now also being treated   for possible alcohol withdrawal. At the present time, he is intubated, but he   is somewhat responsive, does follow some commands and he is oxygenating well   and he has no fever.     PAST MEDICAL HISTORY:  Really unknown except he has had documented drug use in   the past.  He was also on Xarelto for pulmonary embolus about 6 years ago.  We   do not know when his HIV was diagnosed or his risk factors.     ALLERGIES:  IN 2015 were listed as no known drug allergies.  There was a   questionable PCN allergy, but apparently his mother confirmed to the nurse that   there is no  history of allergy.     REVIEW OF SYSTEMS:  Not able to obtain at the present time except that he has   had some agitation with reduction in his sedation.     MEDICATIONS:  At present, he is on clindamycin 600 mg every 8 hours as well as   propofol and pain medicines.     PHYSICAL EXAMINATION:  VITAL SIGNS:  He is intubated and presently afebrile.  He is on 30% FiO2.  His blood   pressure is stable.  His pulse is approximately 100.  GENERAL:  On exam, he is resting comfortably.  He does have an NG tube in for   feedings.  He has a peripheral IV, Chang catheter.  HEENT:  He has a swollen tooth right upper teeth, which is draining  pus and swollen lip.  I   do not see any thrush in there, but there was drainage.  NECK:  His neck is supple.  I can feel a few posterior cervical lymph nodes.    No axillary lymph nodes.  LUNGS:  His lungs are clear anteriorly.  CARDIAC:  Reveals no murmur.  NEUROLOGIC:  He is responsive to commands.  He is moving all extremities and   appears to have full strength throughout.  SKIN:  There are no skin lesions noted at all.     LABORATORY DATA AND DIAGNOSTIC STUDIES:  He did have a CD4 count done in 2019,   which was 251 (as mentioned it was over 650 four years earlier) and he was obviously was not taking his HIV   medicines as his viral load was 379,000 copies compared to  undetectable a few   years previously. On his admission here, he had a CAT scan done, maxillofacial   and it showed a right lateral incisor periapical abscess eroding through the   anterior alveolar bone and calcification of the right optic nerve appearance   suggesting a meningioma.  He also had a CT of his head without contrast, which   was negative.  His serologic testing included a positive treponemal test, but   a the non-treponemal  RPR was negative..  He had a SARS-CoV-2 test,   which was negative.  His white count was 7.2 today with a total lymphocyte   count initially on admission of 2200.  Chemistry panel  showed unremarkable   liver function.  His globulin level was slightly increased at 4.2.  Renal   function was normal.  His tox screen was positive for amphetamines and   cannabinoids.  Chest x-ray reviewed, which was clear.  He had a lumbar   puncture done and blood cultures which are negative.  A lumbar puncture fluid   was also unremarkable with 0 white cells, normal glucose, normal protein.     ASSESSMENT AND PLAN:  This is HIV infected patient. His problems include:  1.  Tooth abscess without involvement of the brain with sinuses.  Not clear if he actually has any antibiotic allergy,, but the clindamycin, which he is presently on is an excellent choice, or switching to Amp/sulbacttam  will be just as effective. May need an extended course of oral therapy to follow if concern for underlying maxillary osteomyelitis.  2.  HIV.  The patient has been on and off medicines and apparently by report   of his significant other, has not been on medicines since April.  We will get   a CD4 count and viral load now and once extubated  extubated , we can start   him back on his Triumeq. Once more coherent can  see if he is interested in continuing therapy.  He   certainly has an option to return to the [facility] if he is interested.  3.  Syphilis test.  His treponemal test will stay positive forever with or without treatment , it is the   RPR that we follow for disease activity and it is now  completely undetectable.   Traditionally when we screen for syphilis, we do the RPR test as the screening test. He does not have CNS syphilis ( He has no white cells in CSF fluid. Normal protein, normal glucose and a negative serologic RPR)  There was a VDRL ordered; however, on the CSF fluid.   .Once extubated we can get more history from him. He may have been given doxycycline at some point for an infection., which wouls also have treated syphilis at the same time       RECOMMENDATIONS:  1.  As above, start Triumeq when he is  extubated.  2.  I will order a CD4 count and HIV RNA to see where we stand at this point.  3.  At this point, no further treatment for this positive treponemal   confirmatory test.  4.  Continue clindamycin.or switch to amp/sulbactam to be followed by extended oral therapy for bone iinfection   5.  Once he is extubated and will have a chance to talk to him further.  We   will be able to get more history and see if he is interested in continuing his   care at the [facility].     Thank you for allowing me to consult on this patient.        ______________________________  MD JORDI MAGANA/DHAVAL    DD:  10/27/2021 07:50  DT:  10/27/2021 08:49    Job#:  [id number]

## 2021-10-27 NOTE — ASSESSMENT & PLAN NOTE
Maxillofacial CT revealed right optic nerve calcification measuring 3.6 mm  Appearance suggestive of meningioma per radiology  At some point repeat imaging and evaluation by neuro-ophthalmology?  Versus neurosurgery  Reviewed results of films with wife and mother at the bedside, encourage them to help facilitate patient following up as an outpatient 10/28  Informed patient of these results 10/29

## 2021-10-27 NOTE — DIETARY
"Nutrition Support Assessment     Nutrition services: late entry computer system down yesterday.  Day 1 of admit.  37 yo male with admitting diagnosis: respiratory failure    Current problem list:  1. Altered mental status  2. Dental abscess  3. Respiratory failure - on vent  4. Methamphetamine, ETOH use  5. History of HIV/Aids    Assessment:  Estimated Nutritional Needs: based on: height 6'2\", weight 83.915 kg (stated weight), IBW 86.183 kg, BMI 23.75    Calculation/Equation: MSJ x 1.2 = 1840 kcals  Calories/day: 2200 - 2400 kcals (26 - 29 kcals/kg)  Protein/day: 100 - 118 g (1.2 - 1.4 g/kg)     Evaluation:   1. Pt on vent in need of nutrition support  2. cortrak placed for enteral access -  Gastric confirmation  3. Admit with tooth pain and encephalopathy. +methamphetamines.  4. RN reports concern for withdrawals  5. Seen by infections disease this morning for review of HIV care. Last seen at [facility] 2016. Will follow up when pt extubated.  6. Propofol @ 15.1 ml/hr (30 mcg/kg/min) providing 399 kcals/day. RN reports likely to wean off propofol and start precedex.   7. Pt will benefit from moderate protein and omega 3 fatty acid containing TF formula.    Malnutrition Risk: not identified    Recommendations/Plan:  1. Diabetisource started yesterday and is currently at 50 ml/hr advancing to full goal 80  Ml/hr which will provide 2304 kcals, 114 g protein, 1559 ml H20/day.  2. Will monitor nutrition status and support    "

## 2021-10-27 NOTE — ASSESSMENT & PLAN NOTE
Right upper incisor dental abscess with bony involvement on maxillofacial CT  IV Unasyn transitioned to Augmentin, 2 more weeks Rx per ID  Likely to benefit from oral surgeon at some point and dental extraction?  Severe dental caries noted on CT as well  Inpatient eval versus follow-up [facility]?  Defer to ID

## 2021-10-27 NOTE — ASSESSMENT & PLAN NOTE
Agitated requiring sedation in ER  Nonfocal exam and does participate at times  CT head negative except for right optic nerve calcification seen on maxillofacial CT  Lumbar puncture negative  UDS positive for THC and amphetamines  Family described significant history of alcohol consumption  Severe agitation markedly improved, weaning Ativan and dexmedetomidine off  Rally bag/vitamins-optimize electrolytes  Strongly suspect withdrawals from alcohol/amphetamines  Back to baseline

## 2021-10-27 NOTE — PROGRESS NOTES
Critical Care Progress Note    Date of admission  10/26/2021    Chief Complaint  36-year-old male with a history of HIV presented with tooth pain and abrupt change in mental status necessitating sedation with subsequent depressed GCS and failure to protect his airway resulting in mechanical ventilation.    Hospital Course  No notes on file    Interval Problem Update  Reviewed last 24 hour events:    Failed SAT with severe agitation  Propofol 30, fentanyl 200  T-max 99.8, WBC 7.2  Hemodynamics reviewed/acceptable  UO better after fluid boluses yesterday  Full vent support, PEEP 8, 30%  Chest x-ray  Clindamycin  Rally vitamins  , no bleeding clinically but hemoglobin slightly down  Blood sugar in the 50s, responded to IV dextrose  Reviewed meds with pharmacy, unlikely a pharmacologic etiology for low blood sugar      Reduce IV fluids but add dextrose to the infusion  Hypoglycemia protocols  ID eval today  Check CD4 count and viral load  Resume HIV regimen as per ID  Ophthalmology eval at some point?  Abnormal CT-right optic nerve meningioma?  Next SAT trial Dex drip as we wean propofol?  Withdrawals from alcohol and possibly amphetamine seems very likely  May need to add benzodiazepines  SBT if tolerates SAT    Laura clarified allergies, he is not allergic to penicillin  Transition clindamycin to Unasyn    Serial follow-up  Tolerating dexmedetomidine and weaning off propofol while remaining on moderate dose fentanyl  SBT tolerated with excellent weaning parameters  Extubation trial  Cut fentanyl in half with extubation and try to wean off overnight  Continue titrate dexmedetomidine  Clinically with withdrawals likely from alcohol and amphetamines  Start Ativan 1 mg IV every 4 scheduled and 1 to 2 mg every 2 hours as needed  Reassess daily      Review of Systems  Review of Systems   Unable to perform ROS: Acuity of condition        Vital Signs for last 24 hours   Temp:  [36.7 °C (98 °F)-38.9 °C (102 °F)] 38.9 °C  (102 °F)  Pulse:  [] 93  Resp:  [24-25] 24  BP: ()/(53-70) 97/57  SpO2:  [96 %-100 %] 98 %    Hemodynamic parameters for last 24 hours       Respiratory Information for the last 24 hours  Vent Mode: Spont  Rate (breaths/min): 24  Vt Target (mL): 430  PEEP/CPAP: 8  MAP: 13  Control VTE (exp VT): 426    Physical Exam   Physical Exam  Vitals reviewed.   Constitutional:       Appearance: He is normal weight. He is ill-appearing. He is not toxic-appearing.      Interventions: He is sedated, intubated and restrained.   HENT:      Head: Normocephalic and atraumatic.      Mouth/Throat:      Mouth: Mucous membranes are moist.   Eyes:      General: No scleral icterus.     Pupils: Pupils are equal, round, and reactive to light.   Cardiovascular:      Rate and Rhythm: Normal rate and regular rhythm.      Pulses: Normal pulses.      Heart sounds: Normal heart sounds. No murmur heard.   No gallop.       Comments: SR  Pulmonary:      Effort: No respiratory distress. He is intubated.      Breath sounds: No wheezing, rhonchi or rales.   Abdominal:      General: Bowel sounds are normal.      Palpations: Abdomen is soft. There is no mass.      Tenderness: There is no abdominal tenderness. There is no right CVA tenderness, left CVA tenderness or guarding.   Musculoskeletal:      Cervical back: Neck supple. No rigidity.      Right lower leg: No edema.      Left lower leg: No edema.   Lymphadenopathy:      Cervical: No cervical adenopathy.   Skin:     General: Skin is warm and dry.      Capillary Refill: Capillary refill takes less than 2 seconds.      Coloration: Skin is not cyanotic, jaundiced or mottled.      Nails: There is no clubbing.   Neurological:      General: No focal deficit present.      Mental Status: He is lethargic.      Comments: Agitated but will respond with sedation vacations at first   Psychiatric:      Comments: Sedated versus agitated         Medications  Current Facility-Administered Medications    Medication Dose Route Frequency Provider Last Rate Last Admin   • D5LR infusion   Intravenous Continuous Chiki Burton M.D. 50 mL/hr at 10/27/21 1010 New Bag at 10/27/21 1010   • ampicillin/sulbactam (UNASYN) 3 g in  mL IVPB  3 g Intravenous Q6HRS Chiki Burton M.D. 200 mL/hr at 10/27/21 1751 3 g at 10/27/21 1751   • glucose 4 g chewable tablet 16 g  16 g Enteral Tube Q15 MIN PRN Chiki Burton M.D.        And   • dextrose 50% (D50W) injection 50 mL  50 mL Intravenous Q15 MIN PRN Chiki Burton M.D.       • folic acid (FOLVITE) tablet 1 mg  1 mg Enteral Tube DAILY Chiki Burton M.D.   1 mg at 10/27/21 1013    And   • multivitamin (THERAGRAN) tablet 1 Tablet  1 Tablet Enteral Tube DAILY Chiki Burton M.D.   1 Tablet at 10/27/21 1013    And   • thiamine (Vitamin B-1) tablet 100 mg  100 mg Enteral Tube DAILY Chiki Burton M.D.   100 mg at 10/27/21 1013   • acetaminophen (TYLENOL) tablet 500 mg  500 mg Enteral Tube Q6HRS PRN Chiki Burton M.D.   500 mg at 10/27/21 1216   • dexmedetomidine (PRECEDEX) 400 mcg/100mL NS premix infusion  0.1-1.5 mcg/kg/hr Intravenous Continuous Chiki Burton M.D. 21 mL/hr at 10/27/21 1808 1 mcg/kg/hr at 10/27/21 1808   • LORazepam (ATIVAN) injection 1 mg  1 mg Intravenous Q4HRS Chiki Burton M.D.   1 mg at 10/27/21 1751   • LORazepam (ATIVAN) injection 1-2 mg  1-2 mg Intravenous Q2HRS PRN hCiki Burton M.D.       • Respiratory Therapy Consult   Nebulization Continuous RT Emiliano Lea M.D.       • famotidine (PEPCID) tablet 20 mg  20 mg Enteral Tube Q12HRS Emiliano Lea M.D.   20 mg at 10/27/21 1751    Or   • famotidine (PEPCID) injection 20 mg  20 mg Intravenous Q12HRS Emiliano Lea M.D.   20 mg at 10/26/21 0518   • senna-docusate (PERICOLACE or SENOKOT S) 8.6-50 MG per tablet 2 Tablet  2 Tablet Enteral Tube BID Emiliano Lea M.D.   2 Tablet at 10/27/21 1751    And   • polyethylene glycol/lytes (MIRALAX) PACKET 1  Packet  1 Packet Enteral Tube QDAY PRN Emiliano Lea M.D.        And   • magnesium hydroxide (MILK OF MAGNESIA) suspension 30 mL  30 mL Enteral Tube QDAY PRN Emiliano Lea M.D.        And   • bisacodyl (DULCOLAX) suppository 10 mg  10 mg Rectal QDAY PRN Emiliano Lea M.D.       • MD Alert...ICU Electrolyte Replacement per Pharmacy   Other PHARMACY TO DOSE Emiliano Lea M.D.       • lidocaine (XYLOCAINE) 1 % injection 2 mL  2 mL Tracheal Tube Q30 MIN PRN Emiliano Lea M.D.       • fentaNYL (SUBLIMAZE) injection 50 mcg  50 mcg Intravenous Q15 MIN PRN Emiliano Lea M.D.        And   • fentaNYL (SUBLIMAZE) injection 100 mcg  100 mcg Intravenous Q15 MIN PRN Emiliano Lea M.D.   100 mcg at 10/26/21 0436    And   • fentaNYL (SUBLIMAZE) 50 mcg/mL in 50mL (Continuous Infusion)   Intravenous Continuous Emiliano Lea M.D. 2 mL/hr at 10/27/21 1803 100 mcg/hr at 10/27/21 1803    And   • propofol (DIPRIVAN) injection  0-40 mcg/kg/min Intravenous Continuous Emiliano Lea M.D. 12.6 mL/hr at 10/27/21 1342 25 mcg/kg/min at 10/27/21 1342   • ipratropium-albuterol (DUONEB) nebulizer solution  3 mL Nebulization Q2HRS PRN (RT) Emiliano Lea M.D.       • enoxaparin (LOVENOX) inj 40 mg  40 mg Subcutaneous DAILY Emiliano Lea M.D.   40 mg at 10/27/21 0436   • Pharmacy Consult: Enteral tube insertion - review meds/change route/product selection  1 Each Other PHARMACY TO DOSE Chiki Burton M.D.           Fluids    Intake/Output Summary (Last 24 hours) at 10/27/2021 1829  Last data filed at 10/27/2021 1200  Gross per 24 hour   Intake 3941.85 ml   Output 985 ml   Net 2956.85 ml       Laboratory  Recent Labs     10/26/21  0236 10/26/21  2053   ISTATAPH 7.268* 7.419   ISTATAPCO2 52.0* 41.3*   ISTATAPO2 118* 77   ISTATATCO2 25 28   IJLHAJR0KLK 98 95   ISTATARTHCO3 23.8 26.7*   ISTATARTBE -4 2   ISTATTEMP 98.4 F 98.0 F   ISTATFIO2 30 30   ISTATSPEC Arterial Arterial   ISTATAPHTC 7.270* 7.424    [id number] 117* 75         Recent Labs     10/26/21  0050 10/26/21  1806 10/27/21  0330   SODIUM 136 136 134*   POTASSIUM 4.0 4.5 4.3   CHLORIDE 100 103 102   CO2 22 25 22   BUN 11 11 12   CREATININE 0.84 1.01 0.77   MAGNESIUM 2.2  --  1.8   PHOSPHORUS  --   --  3.4   CALCIUM 10.2 8.8 8.4*     Recent Labs     10/26/21  0050 10/26/21  1806 10/27/21  0330 10/27/21  1205   ALTSGPT 11  --   --   --    ASTSGOT 28  --   --   --    ALKPHOSPHAT 94  --   --   --    TBILIRUBIN 0.2  --   --   --    PREALBUMIN  --   --  22.2 21.6   GLUCOSE 106* 135* 89  --      Recent Labs     10/26/21  0050 10/27/21  0330   WBC 9.1 7.2   NEUTSPOLYS 66.00 72.30*   LYMPHOCYTES 25.00 19.30*   MONOCYTES 7.70 7.00   EOSINOPHILS 0.80 0.70   BASOPHILS 0.20 0.10   ASTSGOT 28  --    ALTSGPT 11  --    ALKPHOSPHAT 94  --    TBILIRUBIN 0.2  --      Recent Labs     10/26/21  0050 10/27/21  0330   RBC 5.40 4.36*   HEMOGLOBIN 16.7 13.7*   HEMATOCRIT 48.5 40.7*   PLATELETCT 305 150*   PROTHROMBTM 12.4  --    APTT 30.8  --    INR 0.95  --        Imaging  X-Ray:  I have personally reviewed the images and compared with prior images.  EKG:  I have personally reviewed the images and compared with prior images.  CT:    Reviewed    Assessment/Plan  * Altered mental state- (present on admission)  Assessment & Plan  Agitated requiring sedation in ER  Nonfocal exam and does participate at times  CT head negative except for right optic nerve calcification seen on maxillofacial CT  Lumbar puncture negative  UDS positive for THC and amphetamines  Family described significant history of alcohol consumption  Severe agitation with SAT-query withdrawals  Rally bag/vitamins-optimize electrolytes  Trial dexmedetomidine?  If comes off propofol completely likely will need to add low-dose benzos?  Strongly suspect withdrawals from alcohol/amphetamines    Dental abscess with possible bony erosion/osteo  Assessment & Plan  Right upper incisor dental abscess with bony involvement  on maxillofacial CT  Family states patient not pen allergic, this is confirmed with \[facility]\"" clinic through Dr. Up-> DC Clinda and start Unasyn  ID consultation with HIV positivity, off his HIV meds and with abscess/possible osteo  May need oral surgeon at some point and dental extraction  Severe dental caries noted on CT as well    Symptomatic HIV infection (HCC)?- (present on admission)  Assessment & Plan  Family informed us that he has been off HIV meds for months  Reassess serologically, CD4 count/viral load/other-defer to ID  ID consult today-\[facility]\"" clinic patient-reviewed with Dr. Up 10/26  Resume outpatient HIV regimen      Hypoglycemia  Assessment & Plan  Mild, transient  Responding to dextrose  Hypoglycemia protocols  Query malnutrition  No clear medication etiology on review with clinical pharmacist  Add dextrose to IV fluids for now and increase tube feeds to goal and hopefully this problem will resolve    Meningioma (HCC)  Assessment & Plan  Maxillofacial CT revealed right optic nerve calcification measuring 3.6 mm  Appearance suggestive of meningioma per radiology  At some point repeat imaging and evaluation by neuro-ophthalmology?  Versus neurosurgery    Acute respiratory failure (HCC)  Assessment & Plan  Severe agitation requiring sedation unfortunately prompted intubation to protect airway-intubated in ER 10/26  Oxygenation ventilation adequate-current agitation extremely severe with SAT  RT protocol/ventilator bundle  SAT/SBT as clinically appropriate  Early mobility  Tolerated transition propofol to dexmedetomidine  SBT tolerated with good weaning parameters  Liberation trial p.m. 10/27    Polysubstance overdose- (present on admission)  Assessment & Plan  History of dating back many years  UDS positive for amphetamines and cannabinoids  Per family significant EtOH use  Severe agitation in ER and now with SATs-query withdrawals  Continue Rally bag/enteral rally vitamins  Tolerating transition  from propofol to dexmedetomidine  Likely having withdrawals from alcohol and amphetamines  Start Ativan 1 mg IV every 4 hours scheduled and 1 to 2 mg every 2 hours as needed  Reassess Ativan and dexmedetomidine dose daily    Thrombocytopenia (HCC)  Assessment & Plan  Mild,   Serial CBC  Transfuse per protocols    Pulmonary embolism (HCC)- (present on admission)  Assessment & Plan  History of PE 10/2016  Lovenox prophylaxis       VTE:  Lovenox  Ulcer: H2 Antagonist  Lines: Chang Catheter  Ongoing indication addressed    I have performed a physical exam and reviewed and updated ROS and Plan today (10/27/2021). In review of yesterday's note (10/26/2021), there are no changes except as documented above.     Discussed patient condition and risk of morbidity and/or mortality with RN, RT, Pharmacy and Charge nurse / hot rounds     The patient remains critically ill.  Agitation extremely severe requiring high-dose sedation and remains on full vent support.  Active withdrawals ongoing, monitoring for seizures.  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  No time overlap and excludes procedures.

## 2021-10-27 NOTE — ASSESSMENT & PLAN NOTE
History of dating back many years  UDS positive for amphetamines and cannabinoids  Per family significant EtOH use-half a gallon a day  DC Rally bag/enteral rally vitamins  Dexmedetomidine weaned off a.m. 10/29  Significantly improved withdrawals from alcohol and amphetamines  Ativan at 1 mg every 6 hours, may be ready for as needed only   consult for arranging outpatient counseling if patient willing

## 2021-10-27 NOTE — ASSESSMENT & PLAN NOTE
Family informed us that he has been off HIV meds for months  Reassess serologically, CD4 count/viral load pending still  Resuming HIV regimen per ID Anay/Timanuelito/Epivir  Patient to follow-up at [facility]

## 2021-10-27 NOTE — PROGRESS NOTES
"Down time chartin: Dr Burton updated on low urine output.  Orders received for a bolus.  Further updated Dr Burton on allergies and patient's ETOH consumption.    Mother confirmed that patient is not allergic to PCN and has had multiple doses in the past.  Significant other and brother confirm that patient drinks \"1/2 a gallon of whiskey, vodka and other drinks\" every day and has never gone through withdraws.     1445: ingrid Garcia, called to update this RN regarding TF.  The patient will be on Diabetisource with a goal of 80 cc/mL.        "

## 2021-10-28 ENCOUNTER — APPOINTMENT (OUTPATIENT)
Dept: RADIOLOGY | Facility: MEDICAL CENTER | Age: 36
DRG: 917 | End: 2021-10-28
Attending: INTERNAL MEDICINE
Payer: MEDICAID

## 2021-10-28 LAB
ANION GAP SERPL CALC-SCNC: 9 MMOL/L (ref 7–16)
BASOPHILS # BLD AUTO: 0.1 % (ref 0–1.8)
BASOPHILS # BLD: 0.01 K/UL (ref 0–0.12)
BUN SERPL-MCNC: 12 MG/DL (ref 8–22)
BURR CELLS/RBC NFR CSF MANUAL: 0 %
CALCIUM SERPL-MCNC: 8.3 MG/DL (ref 8.5–10.5)
CHLORIDE SERPL-SCNC: 100 MMOL/L (ref 96–112)
CLARITY CSF: CLEAR
CO2 SERPL-SCNC: 24 MMOL/L (ref 20–33)
COLOR CSF: COLORLESS
COLOR SPUN CSF: COLORLESS
CREAT SERPL-MCNC: 0.72 MG/DL (ref 0.5–1.4)
EOSINOPHIL # BLD AUTO: 0.03 K/UL (ref 0–0.51)
EOSINOPHIL NFR BLD: 0.3 % (ref 0–6.9)
ERYTHROCYTE [DISTWIDTH] IN BLOOD BY AUTOMATED COUNT: 40.2 FL (ref 35.9–50)
GLUCOSE SERPL-MCNC: 134 MG/DL (ref 65–99)
HCT VFR BLD AUTO: 39.8 % (ref 42–52)
HGB BLD-MCNC: 13.8 G/DL (ref 14–18)
IMM GRANULOCYTES # BLD AUTO: 0.04 K/UL (ref 0–0.11)
IMM GRANULOCYTES NFR BLD AUTO: 0.5 % (ref 0–0.9)
LYMPHOCYTES # BLD AUTO: 1.17 K/UL (ref 1–4.8)
LYMPHOCYTES NFR BLD: 13.4 % (ref 22–41)
LYMPHOCYTES NFR CSF: 96 %
MAGNESIUM SERPL-MCNC: 2.3 MG/DL (ref 1.5–2.5)
MCH RBC QN AUTO: 31.7 PG (ref 27–33)
MCHC RBC AUTO-ENTMCNC: 34.7 G/DL (ref 33.7–35.3)
MCV RBC AUTO: 91.5 FL (ref 81.4–97.8)
MONOCYTES # BLD AUTO: 0.6 K/UL (ref 0–0.85)
MONOCYTES NFR BLD AUTO: 6.9 % (ref 0–13.4)
MONONUC CELLS NFR CSF: 4 %
NEUTROPHILS # BLD AUTO: 6.9 K/UL (ref 1.82–7.42)
NEUTROPHILS NFR BLD: 78.8 % (ref 44–72)
NRBC # BLD AUTO: 0 K/UL
NRBC BLD-RTO: 0 /100 WBC
PHOSPHATE SERPL-MCNC: 2.8 MG/DL (ref 2.5–4.5)
PLATELET # BLD AUTO: 178 K/UL (ref 164–446)
PMV BLD AUTO: 9.9 FL (ref 9–12.9)
POTASSIUM SERPL-SCNC: 4.4 MMOL/L (ref 3.6–5.5)
RBC # BLD AUTO: 4.35 M/UL (ref 4.7–6.1)
RBC # CSF: 11 CELLS/UL
SODIUM SERPL-SCNC: 133 MMOL/L (ref 135–145)
SPECIMEN VOL CSF: 6.5 ML
T PALLIDUM AB CSF QL IF: NON REACTIVE
TUBE # CSF: 3
TUBE # CSF: 4
VDRL CSF QL: NON REACTIVE
WBC # BLD AUTO: 8.8 K/UL (ref 4.8–10.8)
WBC # CSF: <1 CELLS/UL (ref 0–10)

## 2021-10-28 PROCEDURE — 71045 X-RAY EXAM CHEST 1 VIEW: CPT

## 2021-10-28 PROCEDURE — 700102 HCHG RX REV CODE 250 W/ 637 OVERRIDE(OP): Performed by: NURSE PRACTITIONER

## 2021-10-28 PROCEDURE — 83735 ASSAY OF MAGNESIUM: CPT

## 2021-10-28 PROCEDURE — 84100 ASSAY OF PHOSPHORUS: CPT

## 2021-10-28 PROCEDURE — 99232 SBSQ HOSP IP/OBS MODERATE 35: CPT | Performed by: INTERNAL MEDICINE

## 2021-10-28 PROCEDURE — 700102 HCHG RX REV CODE 250 W/ 637 OVERRIDE(OP): Performed by: INTERNAL MEDICINE

## 2021-10-28 PROCEDURE — 700105 HCHG RX REV CODE 258: Performed by: INTERNAL MEDICINE

## 2021-10-28 PROCEDURE — 80048 BASIC METABOLIC PNL TOTAL CA: CPT

## 2021-10-28 PROCEDURE — A9270 NON-COVERED ITEM OR SERVICE: HCPCS | Performed by: INTERNAL MEDICINE

## 2021-10-28 PROCEDURE — 99291 CRITICAL CARE FIRST HOUR: CPT | Performed by: INTERNAL MEDICINE

## 2021-10-28 PROCEDURE — A9270 NON-COVERED ITEM OR SERVICE: HCPCS | Performed by: NURSE PRACTITIONER

## 2021-10-28 PROCEDURE — 770022 HCHG ROOM/CARE - ICU (200)

## 2021-10-28 PROCEDURE — 700111 HCHG RX REV CODE 636 W/ 250 OVERRIDE (IP): Performed by: INTERNAL MEDICINE

## 2021-10-28 PROCEDURE — 700101 HCHG RX REV CODE 250: Performed by: INTERNAL MEDICINE

## 2021-10-28 PROCEDURE — 85025 COMPLETE CBC W/AUTO DIFF WBC: CPT

## 2021-10-28 RX ORDER — FOLIC ACID 1 MG/1
1 TABLET ORAL DAILY
Status: COMPLETED | OUTPATIENT
Start: 2021-10-29 | End: 2021-10-30

## 2021-10-28 RX ORDER — LORAZEPAM 2 MG/ML
1 INJECTION INTRAMUSCULAR EVERY 6 HOURS
Status: DISCONTINUED | OUTPATIENT
Start: 2021-10-28 | End: 2021-10-31 | Stop reason: HOSPADM

## 2021-10-28 RX ORDER — DEXTROSE MONOHYDRATE 25 G/50ML
50 INJECTION, SOLUTION INTRAVENOUS
Status: DISCONTINUED | OUTPATIENT
Start: 2021-10-28 | End: 2021-10-31 | Stop reason: HOSPADM

## 2021-10-28 RX ORDER — AMOXICILLIN AND CLAVULANATE POTASSIUM 875; 125 MG/1; MG/1
1 TABLET, FILM COATED ORAL EVERY 12 HOURS
Status: DISCONTINUED | OUTPATIENT
Start: 2021-10-28 | End: 2021-10-31 | Stop reason: HOSPADM

## 2021-10-28 RX ORDER — BISACODYL 10 MG
10 SUPPOSITORY, RECTAL RECTAL
Status: DISCONTINUED | OUTPATIENT
Start: 2021-10-28 | End: 2021-10-31 | Stop reason: HOSPADM

## 2021-10-28 RX ORDER — GAUZE BANDAGE 2" X 2"
100 BANDAGE TOPICAL DAILY
Status: COMPLETED | OUTPATIENT
Start: 2021-10-29 | End: 2021-10-31

## 2021-10-28 RX ORDER — AMOXICILLIN 250 MG
2 CAPSULE ORAL 2 TIMES DAILY
Status: DISCONTINUED | OUTPATIENT
Start: 2021-10-28 | End: 2021-10-31 | Stop reason: HOSPADM

## 2021-10-28 RX ORDER — FAMOTIDINE 20 MG/1
20 TABLET, FILM COATED ORAL EVERY 12 HOURS
Status: DISCONTINUED | OUTPATIENT
Start: 2021-10-28 | End: 2021-10-28

## 2021-10-28 RX ORDER — ABACAVIR 300 MG/1
600 TABLET ORAL DAILY
Status: DISCONTINUED | OUTPATIENT
Start: 2021-10-28 | End: 2021-10-31 | Stop reason: HOSPADM

## 2021-10-28 RX ORDER — LAMIVUDINE 150 MG/1
300 TABLET, FILM COATED ORAL DAILY
Status: DISCONTINUED | OUTPATIENT
Start: 2021-10-28 | End: 2021-10-31 | Stop reason: HOSPADM

## 2021-10-28 RX ORDER — POLYETHYLENE GLYCOL 3350 17 G/17G
1 POWDER, FOR SOLUTION ORAL
Status: DISCONTINUED | OUTPATIENT
Start: 2021-10-28 | End: 2021-10-31 | Stop reason: HOSPADM

## 2021-10-28 RX ADMIN — LAMIVUDINE 300 MG: 150 TABLET, FILM COATED ORAL at 10:23

## 2021-10-28 RX ADMIN — LORAZEPAM 1 MG: 2 INJECTION INTRAMUSCULAR; INTRAVENOUS at 17:21

## 2021-10-28 RX ADMIN — LORAZEPAM 1 MG: 2 INJECTION INTRAMUSCULAR; INTRAVENOUS at 22:29

## 2021-10-28 RX ADMIN — ENOXAPARIN SODIUM 40 MG: 40 INJECTION SUBCUTANEOUS at 05:43

## 2021-10-28 RX ADMIN — DEXMEDETOMIDINE 1.2 MCG/KG/HR: 200 INJECTION, SOLUTION INTRAVENOUS at 03:25

## 2021-10-28 RX ADMIN — SODIUM CHLORIDE, SODIUM LACTATE, POTASSIUM CHLORIDE, CALCIUM CHLORIDE AND DEXTROSE MONOHYDRATE: 5; 600; 310; 30; 20 INJECTION, SOLUTION INTRAVENOUS at 05:43

## 2021-10-28 RX ADMIN — Medication 100 MG: at 05:42

## 2021-10-28 RX ADMIN — DOCUSATE SODIUM 50 MG AND SENNOSIDES 8.6 MG 2 TABLET: 8.6; 5 TABLET, FILM COATED ORAL at 05:42

## 2021-10-28 RX ADMIN — FAMOTIDINE 20 MG: 20 TABLET ORAL at 05:42

## 2021-10-28 RX ADMIN — LORAZEPAM 1 MG: 2 INJECTION INTRAMUSCULAR; INTRAVENOUS at 02:14

## 2021-10-28 RX ADMIN — OXYCODONE 5 MG: 5 TABLET ORAL at 22:30

## 2021-10-28 RX ADMIN — AMPICILLIN SODIUM AND SULBACTAM SODIUM 3 G: 2; 1 INJECTION, POWDER, FOR SOLUTION INTRAMUSCULAR; INTRAVENOUS at 12:24

## 2021-10-28 RX ADMIN — FOLIC ACID 1 MG: 1 TABLET ORAL at 05:42

## 2021-10-28 RX ADMIN — AMOXICILLIN AND CLAVULANATE POTASSIUM 1 TABLET: 875; 125 TABLET, FILM COATED ORAL at 17:20

## 2021-10-28 RX ADMIN — DEXMEDETOMIDINE 1.2 MCG/KG/HR: 200 INJECTION, SOLUTION INTRAVENOUS at 08:00

## 2021-10-28 RX ADMIN — DOLUTEGRAVIR SODIUM 50 MG: 50 TABLET, FILM COATED ORAL at 10:23

## 2021-10-28 RX ADMIN — AMPICILLIN SODIUM AND SULBACTAM SODIUM 3 G: 2; 1 INJECTION, POWDER, FOR SOLUTION INTRAMUSCULAR; INTRAVENOUS at 05:43

## 2021-10-28 RX ADMIN — LORAZEPAM 1 MG: 2 INJECTION INTRAMUSCULAR; INTRAVENOUS at 14:15

## 2021-10-28 RX ADMIN — DEXMEDETOMIDINE 0.6 MCG/KG/HR: 200 INJECTION, SOLUTION INTRAVENOUS at 21:40

## 2021-10-28 RX ADMIN — THERA TABS 1 TABLET: TAB at 05:42

## 2021-10-28 RX ADMIN — LORAZEPAM 1 MG: 2 INJECTION INTRAMUSCULAR; INTRAVENOUS at 05:43

## 2021-10-28 RX ADMIN — LORAZEPAM 1 MG: 2 INJECTION INTRAMUSCULAR; INTRAVENOUS at 10:22

## 2021-10-28 RX ADMIN — ABACAVIR 600 MG: 300 TABLET, FILM COATED ORAL at 10:22

## 2021-10-28 RX ADMIN — DEXMEDETOMIDINE 0.7 MCG/KG/HR: 200 INJECTION, SOLUTION INTRAVENOUS at 13:26

## 2021-10-28 ASSESSMENT — PATIENT HEALTH QUESTIONNAIRE - PHQ9
1. LITTLE INTEREST OR PLEASURE IN DOING THINGS: NOT AT ALL
2. FEELING DOWN, DEPRESSED, IRRITABLE, OR HOPELESS: NOT AT ALL
SUM OF ALL RESPONSES TO PHQ9 QUESTIONS 1 AND 2: 0

## 2021-10-28 ASSESSMENT — FIBROSIS 4 INDEX: FIB4 SCORE: 1.71

## 2021-10-28 ASSESSMENT — ENCOUNTER SYMPTOMS
SHORTNESS OF BREATH: 0
SORE THROAT: 1

## 2021-10-28 ASSESSMENT — PAIN DESCRIPTION - PAIN TYPE
TYPE: ACUTE PAIN
TYPE: ACUTE PAIN

## 2021-10-28 NOTE — PROGRESS NOTES
Critical Care Progress Note    Date of admission  10/26/2021    Chief Complaint  36-year-old male with a history of HIV presented with tooth pain and abrupt change in mental status necessitating sedation with subsequent depressed GCS and failure to protect his airway resulting in mechanical ventilation. (Dr Smith HPI)    Hospital Course    10/27 - extubated, Prop -> DEX/ativan for WDs, Unasyn      Interval Problem Update  Reviewed last 24 hour events:    A&O x3  Tolerated extubation yesterday afternoon  Ativan 1 mg every 4 hours, no as needed Ativan required  Dexmedetomidine actively titrated, drip at 1.2 this a.m. and weaned to 0 point 5 in the afternoon  Patient following but a bit aggressive at times  Afebrile with WBC 8.8  IV Unasyn  Cultures remain negative  Viral load and CD4 count pending-ID following  Mild hyponatremia persists, sodium 133  No further hypoglycemia    Wean Ativan  Wean dexmedetomidine  Pull Chang catheter  Clean up MAR from ventilator medications  Hopefully wean off dexmedetomidine by tomorrow morning and transfer out of unit  Mobilize   consult at some point Re: Outpatient counseling for substance abuse  Transition to oral antibiotics as per ID  HIV medications resumed as per ID    Updated wife and mother at great length at bedside, had excellent long conversation with him.  Described initial events in the ER as I read them in the chart.  Outlined weaning off the ventilator process and how he is doing better.  They describe his substance abuse with amphetamines and alcohol and described him refusing to take his HIV meds unfortunately.  I also outlined the results of the CT showing the tooth abscess with perhaps some bony involvement and the possible right optic nerve meningioma for which he will need an outpatient ophthalmology eval.  Perhaps that can be lined up through the [facility]?       YESTERDAY  Failed SAT with severe agitation  Propofol 30, fentanyl 200  T-max 99.8,  WBC 7.2  Hemodynamics reviewed/acceptable  UO better after fluid boluses yesterday  Full vent support, PEEP 8, 30%  Chest x-ray  Clindamycin  Rally vitamins  , no bleeding clinically but hemoglobin slightly down  Blood sugar in the 50s, responded to IV dextrose  Reviewed meds with pharmacy, unlikely a pharmacologic etiology for low blood sugar    Reduce IV fluids but add dextrose to the infusion  Hypoglycemia protocols  ID eval today  Check CD4 count and viral load  Resume HIV regimen as per ID  Ophthalmology eval at some point?  Abnormal CT-right optic nerve meningioma?  Next SAT trial Dex drip as we wean propofol?  Withdrawals from alcohol and possibly amphetamine seems very likely  May need to add benzodiazepines  SBT if tolerates SAT    Family clarified allergies, he is not allergic to penicillin  Transition clindamycin to Unasyn    Serial follow-up  Tolerating dexmedetomidine and weaning off propofol while remaining on moderate dose fentanyl  SBT tolerated with excellent weaning parameters  Extubation trial  Cut fentanyl in half with extubation and try to wean off overnight  Continue titrate dexmedetomidine  Clinically with withdrawals likely from alcohol and amphetamines  Start Ativan 1 mg IV every 4 scheduled and 1 to 2 mg every 2 hours as needed  Reassess daily      Review of Systems  Review of Systems   Unable to perform ROS: Acuity of condition        Vital Signs for last 24 hours   Temp:  [37.2 °C (98.9 °F)-37.9 °C (100.2 °F)] 37.2 °C (99 °F)  Pulse:  [] 81  Resp:  [18-68] 19  BP: (101-145)/(51-88) 140/63  SpO2:  [90 %-100 %] 92 %    Hemodynamic parameters for last 24 hours       Respiratory Information for the last 24 hours       Physical Exam   Physical Exam  Vitals reviewed.   Constitutional:       Appearance: He is normal weight. He is not ill-appearing, toxic-appearing or diaphoretic.      Interventions: He is sedated.   HENT:      Head: Normocephalic and atraumatic.      Nose:       Comments: Enteral feeding tube which we will remove     Mouth/Throat:      Mouth: Mucous membranes are moist.   Eyes:      General: No scleral icterus.     Pupils: Pupils are equal, round, and reactive to light.   Cardiovascular:      Rate and Rhythm: Normal rate and regular rhythm.      Pulses: Normal pulses.      Heart sounds: Normal heart sounds. No murmur heard.   No gallop.       Comments: SR  Pulmonary:      Effort: No accessory muscle usage or respiratory distress.      Breath sounds: No wheezing, rhonchi or rales.   Abdominal:      General: Bowel sounds are normal.      Palpations: Abdomen is soft. There is no mass.      Tenderness: There is no abdominal tenderness. There is no right CVA tenderness, left CVA tenderness or guarding.   Genitourinary:     Comments: Chang which we will remove  Musculoskeletal:      Cervical back: Neck supple. No rigidity.      Right lower leg: No edema.      Left lower leg: No edema.   Lymphadenopathy:      Cervical: No cervical adenopathy.   Skin:     General: Skin is warm and dry.      Capillary Refill: Capillary refill takes less than 2 seconds.      Coloration: Skin is not cyanotic, jaundiced or mottled.      Nails: There is no clubbing.   Neurological:      General: No focal deficit present.      Mental Status: He is lethargic.      Comments: Agitated but will respond with sedation vacations at first   Psychiatric:         Speech: Speech normal.         Behavior: Behavior is agitated (At times). Behavior is cooperative.      Comments: Sedated versus agitated         Medications  Current Facility-Administered Medications   Medication Dose Route Frequency Provider Last Rate Last Admin   • dolutegravir (TIVICAY) tablet 50 mg  50 mg Oral DAILY Jaxson Up M.D.   50 mg at 10/28/21 1023   • abacavir (Ziagen) tablet 600 mg  600 mg Oral DAILY Jaxson Up M.D.   600 mg at 10/28/21 1022   • lamiVUDine (Epivir) tablet 300 mg  300 mg Oral DAILY Jaxson Up M.D.    300 mg at 10/28/21 1023   • [START ON 10/29/2021] folic acid (FOLVITE) tablet 1 mg  1 mg Oral DAILY Chiki Burton M.D.        And   • [START ON 10/29/2021] multivitamin (THERAGRAN) tablet 1 Tablet  1 Tablet Oral DAILY Chiki Burton M.D.        And   • [START ON 10/29/2021] thiamine (Vitamin B-1) tablet 100 mg  100 mg Oral DAILY Chiki Burton M.D.       • senna-docusate (PERICOLACE or SENOKOT S) 8.6-50 MG per tablet 2 Tablet  2 Tablet Oral BID Cihki Burton M.D.        And   • polyethylene glycol/lytes (MIRALAX) PACKET 1 Packet  1 Packet Oral QDAY PRN Chiki Burton M.D.        And   • magnesium hydroxide (MILK OF MAGNESIA) suspension 30 mL  30 mL Oral QDAY PRN Chiki Burton M.D.        And   • bisacodyl (DULCOLAX) suppository 10 mg  10 mg Rectal QDAY PRN Chiki Burton M.D.       • glucose 4 g chewable tablet 16 g  16 g Oral Q15 MIN PRN Chiki Burton M.D.        And   • dextrose 50% (D50W) injection 50 mL  50 mL Intravenous Q15 MIN PRN Chiki Burton M.D.       • amoxicillin-clavulanate (AUGMENTIN) 875-125 MG per tablet 1 Tablet  1 Tablet Oral Q12HRS Jaxson Up M.D.   1 Tablet at 10/28/21 1720   • LORazepam (ATIVAN) injection 1 mg  1 mg Intravenous Q6HR Chiki Burton M.D.       • D5LR infusion   Intravenous Continuous Chiki Burton M.D.   Paused at 10/28/21 0821   • acetaminophen (TYLENOL) tablet 500 mg  500 mg Enteral Tube Q6HRS PRN Chiki Burton M.D.   500 mg at 10/27/21 2039   • dexmedetomidine (PRECEDEX) 400 mcg/100mL NS premix infusion  0.1-1.5 mcg/kg/hr Intravenous Continuous Chiki Burton M.D. 12.6 mL/hr at 10/28/21 1430 0.6 mcg/kg/hr at 10/28/21 1430   • LORazepam (ATIVAN) injection 1-2 mg  1-2 mg Intravenous Q2HRS PRN Chiki Burton M.D.       • oxyCODONE immediate-release (ROXICODONE) tablet 5 mg  5 mg Oral Q6HRS PRN Thuy Fisher, A.P.R.N.       • Respiratory Therapy Consult   Nebulization Continuous RT Emiliano Lea M.D.        • MD Alert...ICU Electrolyte Replacement per Pharmacy   Other PHARMACY TO DOSE Emiliano Lea M.D.       • fentaNYL (SUBLIMAZE) injection 50 mcg  50 mcg Intravenous Q15 MIN PRN Emiliano Lea M.D.        And   • fentaNYL (SUBLIMAZE) injection 100 mcg  100 mcg Intravenous Q15 MIN PRN Emiliano Lea M.D.   100 mcg at 10/26/21 0436   • ipratropium-albuterol (DUONEB) nebulizer solution  3 mL Nebulization Q2HRS PRN (RT) Emiliano Lea M.D.       • enoxaparin (LOVENOX) inj 40 mg  40 mg Subcutaneous DAILY Emiliano Lea M.D.   40 mg at 10/28/21 0543       Fluids    Intake/Output Summary (Last 24 hours) at 10/28/2021 1848  Last data filed at 10/28/2021 1800  Gross per 24 hour   Intake 3416.91 ml   Output 4370 ml   Net -953.09 ml       Laboratory  Recent Labs     10/26/21  0236 10/26/21  2053   ISTATAPH 7.268* 7.419   ISTATAPCO2 52.0* 41.3*   ISTATAPO2 118* 77   ISTATATCO2 25 28   CGTWXNP4BLE 98 95   ISTATARTHCO3 23.8 26.7*   ISTATARTBE -4 2   ISTATTEMP 98.4 F 98.0 F   ISTATFIO2 30 30   ISTATSPEC Arterial Arterial   ISTATAPHTC 7.270* 7.424   YYBLKEIZ4CM 117* 75         Recent Labs     10/26/21  0050 10/26/21  0050 10/26/21  1806 10/27/21  0330 10/28/21  0430   SODIUM 136   < > 136 134* 133*   POTASSIUM 4.0   < > 4.5 4.3 4.4   CHLORIDE 100   < > 103 102 100   CO2 22   < > 25 22 24   BUN 11   < > 11 12 12   CREATININE 0.84   < > 1.01 0.77 0.72   MAGNESIUM 2.2  --   --  1.8 2.3   PHOSPHORUS  --   --   --  3.4 2.8   CALCIUM 10.2   < > 8.8 8.4* 8.3*    < > = values in this interval not displayed.     Recent Labs     10/26/21  0050 10/26/21  0050 10/26/21  1806 10/27/21  0330 10/27/21  1205 10/28/21  0430   ALTSGPT 11  --   --   --   --   --    ASTSGOT 28  --   --   --   --   --    ALKPHOSPHAT 94  --   --   --   --   --    TBILIRUBIN 0.2  --   --   --   --   --    PREALBUMIN  --   --   --  22.2 21.6  --    GLUCOSE 106*   < > 135* 89  --  134*    < > = values in this interval not displayed.     Recent Labs      10/26/21  0050 10/27/21  0330 10/28/21  0430   WBC 9.1 7.2 8.8   NEUTSPOLYS 66.00 72.30* 78.80*   LYMPHOCYTES 25.00 19.30* 13.40*   MONOCYTES 7.70 7.00 6.90   EOSINOPHILS 0.80 0.70 0.30   BASOPHILS 0.20 0.10 0.10   ASTSGOT 28  --   --    ALTSGPT 11  --   --    ALKPHOSPHAT 94  --   --    TBILIRUBIN 0.2  --   --      Recent Labs     10/26/21  0050 10/27/21  0330 10/28/21  0430   RBC 5.40 4.36* 4.35*   HEMOGLOBIN 16.7 13.7* 13.8*   HEMATOCRIT 48.5 40.7* 39.8*   PLATELETCT 305 150* 178   PROTHROMBTM 12.4  --   --    APTT 30.8  --   --    INR 0.95  --   --        Imaging  X-Ray:  I have personally reviewed the images and compared with prior images.  EKG:  I have personally reviewed the images and compared with prior images.  CT:    Reviewed    Assessment/Plan  * Altered mental state- (present on admission)  Assessment & Plan  Agitated requiring sedation in ER  Nonfocal exam and does participate at times  CT head negative except for right optic nerve calcification seen on maxillofacial CT  Lumbar puncture negative  UDS positive for THC and amphetamines  Family described significant history of alcohol consumption  Severe agitation with SAT-likely withdrawals-improving with Ativan/dexmedetomidine/time  Rally bag/vitamins-optimize electrolytes  Strongly suspect withdrawals from alcohol/amphetamines  Clinically improving and off the ventilator, wean Ativan and dexmedetomidine as tolerated    Dental abscess with possible bony erosion/osteo  Assessment & Plan  Right upper incisor dental abscess with bony involvement on maxillofacial CT  IV Unasyn, transition to Augmentin as per ID, likely will need long course?  ID consultation with HIV positivity, off his HIV meds and with abscess/possible osteo  May need oral surgeon at some point and dental extraction?  Severe dental caries noted on CT as well    Symptomatic HIV infection (HCC)?- (present on admission)  Assessment & Plan  Family informed us that he has been off HIV meds for  months  Reassess serologically, CD4 count/viral load pending  Resuming HIV regimen per ID  Patient to follow-up at [place]      Hypoglycemia  Assessment & Plan  Mild, transient-resolved  Hypoglycemia protocols  Query malnutrition  No clear medication etiology on review with clinical pharmacist  Intravenous dextrose/nutrition seem to have resolved this problem for now, continue to monitor    Meningioma (HCC)  Assessment & Plan  Maxillofacial CT revealed right optic nerve calcification measuring 3.6 mm  Appearance suggestive of meningioma per radiology  At some point repeat imaging and evaluation by neuro-ophthalmology?  Versus neurosurgery  Reviewed results of films with wife and mother at the bedside, encourage them to help facilitate patient following up as an outpatient    Acute respiratory failure (HCC)  Assessment & Plan  Severe agitation requiring sedation unfortunately prompted intubation to protect airway-intubated in ER 10/26  Extubated 10/27  RT protocols  Incentive spirometry  Mobilize      Polysubstance overdose- (present on admission)  Assessment & Plan  History of dating back many years  UDS positive for amphetamines and cannabinoids  Per family significant EtOH use  Continue Rally bag/enteral rally vitamins  Actively titrating dexmedetomidine-will attempt to wean dose overnight to off in a.m.?  Likely having withdrawals from alcohol and amphetamines  Continue Ativan but since no as needed doses overnight will decrease frequency from every 4 to every 6  Reassess Ativan and dexmedetomidine dose daily   consult for arranging outpatient counseling if patient willing    Thrombocytopenia (HCC)  Assessment & Plan  Improved, 178-normal  Serial CBC  Transfuse per protocols    Pulmonary embolism (HCC)- (present on admission)  Assessment & Plan  History of PE 10/2016  Lovenox prophylaxis ongoing       VTE:  Lovenox  Ulcer: H2 Antagonist  Lines: Chang Catheter  Ongoing indication addressed    I  have performed a physical exam and reviewed and updated ROS and Plan today (10/28/2021). In review of yesterday's note (10/27/2021), there are no changes except as documented above.     Discussed patient condition and risk of morbidity and/or mortality with Family, RN, RT, Pharmacy, Charge nurse / hot rounds, Patient and infectious disease     The patient remains critically ill.  Tolerated weaning from the ventilator and extubation.  Still agitated with withdrawals requiring continuous infusion of dexmedetomidine and IV lorazepam.    Critical care time = 45 minutes in directly providing and coordinating critical care and extensive data review.  No time overlap and excludes procedures.

## 2021-10-28 NOTE — PROGRESS NOTES
Late entry:    Patient pulled out cortrak.  Bedside nurse swallow eval done.  Patient had no coughing with swallow eval.  Able to swallow pills. Regular diet ordered, Dr. Burton notified.      Spoke to patient about plan of care for today.  Patient would like to go home today if possible.  Discussed with Dr Burton.  Orders received to attempt to ween dexmedetomidine.  Patient updated on plan of care.  Patient's mother, Yocasta, updated on plan of care.

## 2021-10-29 ENCOUNTER — ANESTHESIA EVENT (OUTPATIENT)
Dept: SURGERY | Facility: MEDICAL CENTER | Age: 36
DRG: 917 | End: 2021-10-29
Payer: MEDICAID

## 2021-10-29 ENCOUNTER — APPOINTMENT (OUTPATIENT)
Dept: RADIOLOGY | Facility: MEDICAL CENTER | Age: 36
DRG: 917 | End: 2021-10-29
Attending: HOSPITALIST
Payer: MEDICAID

## 2021-10-29 PROBLEM — D69.6 THROMBOCYTOPENIA (HCC): Status: RESOLVED | Noted: 2021-10-27 | Resolved: 2021-10-29

## 2021-10-29 LAB
ANION GAP SERPL CALC-SCNC: 11 MMOL/L (ref 7–16)
BACTERIA CSF CULT: NORMAL
BASOPHILS # BLD AUTO: 0.1 % (ref 0–1.8)
BASOPHILS # BLD: 0.01 K/UL (ref 0–0.12)
BUN SERPL-MCNC: 8 MG/DL (ref 8–22)
CALCIUM SERPL-MCNC: 8.8 MG/DL (ref 8.5–10.5)
CD3+CD4+ CELLS # BLD: 152 CELLS/UL (ref 430–1800)
CD3+CD4+ CELLS NFR BLD: 12 % (ref 32–64)
CHLORIDE SERPL-SCNC: 97 MMOL/L (ref 96–112)
CO2 SERPL-SCNC: 24 MMOL/L (ref 20–33)
CREAT SERPL-MCNC: 0.7 MG/DL (ref 0.5–1.4)
EOSINOPHIL # BLD AUTO: 0.02 K/UL (ref 0–0.51)
EOSINOPHIL NFR BLD: 0.3 % (ref 0–6.9)
ERYTHROCYTE [DISTWIDTH] IN BLOOD BY AUTOMATED COUNT: 38.5 FL (ref 35.9–50)
GLUCOSE SERPL-MCNC: 106 MG/DL (ref 65–99)
GRAM STN SPEC: NORMAL
HCT VFR BLD AUTO: 41 % (ref 42–52)
HGB BLD-MCNC: 14.3 G/DL (ref 14–18)
IMM GRANULOCYTES # BLD AUTO: 0.02 K/UL (ref 0–0.11)
IMM GRANULOCYTES NFR BLD AUTO: 0.3 % (ref 0–0.9)
IMMUNODEFICIENCY MARKERS SPEC-IMP: ABNORMAL
LYMPHOCYTES # BLD AUTO: 1.62 K/UL (ref 1–4.8)
LYMPHOCYTES NFR BLD: 22.9 % (ref 22–41)
MAGNESIUM SERPL-MCNC: 2.1 MG/DL (ref 1.5–2.5)
MCH RBC QN AUTO: 31.4 PG (ref 27–33)
MCHC RBC AUTO-ENTMCNC: 34.9 G/DL (ref 33.7–35.3)
MCV RBC AUTO: 90.1 FL (ref 81.4–97.8)
MONOCYTES # BLD AUTO: 0.71 K/UL (ref 0–0.85)
MONOCYTES NFR BLD AUTO: 10.1 % (ref 0–13.4)
NEUTROPHILS # BLD AUTO: 4.68 K/UL (ref 1.82–7.42)
NEUTROPHILS NFR BLD: 66.3 % (ref 44–72)
NRBC # BLD AUTO: 0 K/UL
NRBC BLD-RTO: 0 /100 WBC
PHOSPHATE SERPL-MCNC: 3.8 MG/DL (ref 2.5–4.5)
PLATELET # BLD AUTO: 179 K/UL (ref 164–446)
PMV BLD AUTO: 10.1 FL (ref 9–12.9)
POTASSIUM SERPL-SCNC: 4.2 MMOL/L (ref 3.6–5.5)
RBC # BLD AUTO: 4.55 M/UL (ref 4.7–6.1)
SIGNIFICANT IND 70042: NORMAL
SITE SITE: NORMAL
SODIUM SERPL-SCNC: 132 MMOL/L (ref 135–145)
SOURCE SOURCE: NORMAL
TRIGL SERPL-MCNC: 91 MG/DL (ref 0–149)
WBC # BLD AUTO: 7.1 K/UL (ref 4.8–10.8)

## 2021-10-29 PROCEDURE — 84100 ASSAY OF PHOSPHORUS: CPT

## 2021-10-29 PROCEDURE — A9270 NON-COVERED ITEM OR SERVICE: HCPCS | Performed by: INTERNAL MEDICINE

## 2021-10-29 PROCEDURE — 700111 HCHG RX REV CODE 636 W/ 250 OVERRIDE (IP): Performed by: INTERNAL MEDICINE

## 2021-10-29 PROCEDURE — 99233 SBSQ HOSP IP/OBS HIGH 50: CPT | Performed by: INTERNAL MEDICINE

## 2021-10-29 PROCEDURE — 70355 PANORAMIC X-RAY OF JAWS: CPT

## 2021-10-29 PROCEDURE — 80048 BASIC METABOLIC PNL TOTAL CA: CPT

## 2021-10-29 PROCEDURE — 700102 HCHG RX REV CODE 250 W/ 637 OVERRIDE(OP): Performed by: INTERNAL MEDICINE

## 2021-10-29 PROCEDURE — 85025 COMPLETE CBC W/AUTO DIFF WBC: CPT

## 2021-10-29 PROCEDURE — 99254 IP/OBS CNSLTJ NEW/EST MOD 60: CPT | Performed by: HOSPITALIST

## 2021-10-29 PROCEDURE — 700105 HCHG RX REV CODE 258: Performed by: HOSPITALIST

## 2021-10-29 PROCEDURE — 700102 HCHG RX REV CODE 250 W/ 637 OVERRIDE(OP): Performed by: NURSE PRACTITIONER

## 2021-10-29 PROCEDURE — 83735 ASSAY OF MAGNESIUM: CPT

## 2021-10-29 PROCEDURE — A9270 NON-COVERED ITEM OR SERVICE: HCPCS | Performed by: NURSE PRACTITIONER

## 2021-10-29 PROCEDURE — 770001 HCHG ROOM/CARE - MED/SURG/GYN PRIV*

## 2021-10-29 PROCEDURE — 84478 ASSAY OF TRIGLYCERIDES: CPT

## 2021-10-29 RX ORDER — DEXTROSE AND SODIUM CHLORIDE 5; .9 G/100ML; G/100ML
INJECTION, SOLUTION INTRAVENOUS CONTINUOUS
Status: DISCONTINUED | OUTPATIENT
Start: 2021-10-29 | End: 2021-10-31 | Stop reason: HOSPADM

## 2021-10-29 RX ADMIN — OXYCODONE 5 MG: 5 TABLET ORAL at 15:50

## 2021-10-29 RX ADMIN — THERA TABS 1 TABLET: TAB at 05:30

## 2021-10-29 RX ADMIN — OXYCODONE 5 MG: 5 TABLET ORAL at 23:38

## 2021-10-29 RX ADMIN — ACETAMINOPHEN 500 MG: 500 TABLET ORAL at 19:26

## 2021-10-29 RX ADMIN — AMOXICILLIN AND CLAVULANATE POTASSIUM 1 TABLET: 875; 125 TABLET, FILM COATED ORAL at 05:29

## 2021-10-29 RX ADMIN — LORAZEPAM 1 MG: 2 INJECTION INTRAMUSCULAR; INTRAVENOUS at 11:21

## 2021-10-29 RX ADMIN — LORAZEPAM 1 MG: 2 INJECTION INTRAMUSCULAR; INTRAVENOUS at 23:30

## 2021-10-29 RX ADMIN — FOLIC ACID 1 MG: 1 TABLET ORAL at 05:30

## 2021-10-29 RX ADMIN — ABACAVIR 600 MG: 300 TABLET, FILM COATED ORAL at 05:29

## 2021-10-29 RX ADMIN — DEXTROSE AND SODIUM CHLORIDE: 5; 900 INJECTION, SOLUTION INTRAVENOUS at 13:30

## 2021-10-29 RX ADMIN — ENOXAPARIN SODIUM 40 MG: 40 INJECTION SUBCUTANEOUS at 05:30

## 2021-10-29 RX ADMIN — Medication 100 MG: at 05:30

## 2021-10-29 RX ADMIN — AMOXICILLIN AND CLAVULANATE POTASSIUM 1 TABLET: 875; 125 TABLET, FILM COATED ORAL at 17:07

## 2021-10-29 RX ADMIN — OXYCODONE 5 MG: 5 TABLET ORAL at 08:19

## 2021-10-29 RX ADMIN — LORAZEPAM 1 MG: 2 INJECTION INTRAMUSCULAR; INTRAVENOUS at 17:07

## 2021-10-29 RX ADMIN — LORAZEPAM 1 MG: 2 INJECTION INTRAMUSCULAR; INTRAVENOUS at 04:40

## 2021-10-29 RX ADMIN — DOLUTEGRAVIR SODIUM 50 MG: 50 TABLET, FILM COATED ORAL at 05:30

## 2021-10-29 RX ADMIN — LAMIVUDINE 300 MG: 150 TABLET, FILM COATED ORAL at 05:30

## 2021-10-29 ASSESSMENT — PAIN DESCRIPTION - PAIN TYPE
TYPE: ACUTE PAIN

## 2021-10-29 ASSESSMENT — ENCOUNTER SYMPTOMS
NAUSEA: 0
PALPITATIONS: 0
CHILLS: 0
FEVER: 0
HALLUCINATIONS: 0
SORE THROAT: 0
COUGH: 0
FOCAL WEAKNESS: 0
ABDOMINAL PAIN: 0
SHORTNESS OF BREATH: 0
VOMITING: 0
DIARRHEA: 0
EYES NEGATIVE: 1
HEADACHES: 0

## 2021-10-29 ASSESSMENT — FIBROSIS 4 INDEX
FIB4 SCORE: 1.7
FIB4 SCORE: 1.71

## 2021-10-29 NOTE — PROGRESS NOTES
"36 year old male patient seen today in f/u for dental abscess and HIV  He has now been extubated and on mild sedation. He is alert enough to answer questions.  Tells me has had been couch surfing, does not have stable housing. .Has not been taking his Triumeq for months.  He thinks he was told he had syphilis recently in the senior living, but he is not sure. Major complaint is his bad teeth causing pain.     Scheduled Medications   Medication Dose Frequency   • dolutegravir  50 mg DAILY   • abacavir  600 mg DAILY   • lamiVUDine  300 mg DAILY   • [START ON 10/29/2021] folic acid  1 mg DAILY    And   • [START ON 10/29/2021] multivitamin  1 Tablet DAILY    And   • [START ON 10/29/2021] thiamine  100 mg DAILY   • senna-docusate  2 Tablet BID   • amoxicillin-clavulanate  1 Tablet Q12HRS   • LORazepam  1 mg Q4HRS   • MD Alert...Adult ICU Electrolyte Replacement per Pharmacy   PHARMACY TO DOSE   • enoxaparin (LOVENOX) injection  40 mg DAILY     .Review of Systems   HENT: Positive for sore throat.    Respiratory: Negative for shortness of breath.    Skin: Negative for rash.     /88   Pulse 85   Temp 37.2 °C (99 °F) (Temporal)   Resp 19   Ht 1.88 m (6' 2\")   Wt 100 kg (220 lb 7.4 oz)   SpO2 91%    Physical Exam  Constitutional:       Comments: Alert but uncomfortable from teeth pain    HENT:      Mouth/Throat:      Comments: Bad dentition and some purulent drainage   Cardiovascular:      Pulses: Normal pulses.   Pulmonary:      Effort: Pulmonary effort is normal.      Breath sounds: No wheezing.   Abdominal:      General: There is no distension.      Tenderness: There is no abdominal tenderness.   Musculoskeletal:      Cervical back: Normal range of motion.   Lymphadenopathy:      Cervical: Cervical adenopathy present.   Skin:     Findings: No rash.   Neurological:      Mental Status: He is alert.     LABS-     Pending- HIV RNA, CD4 count    Ref Range & Units 2 d ago   Vdrl - CSF Non Reactive Non Reactive    Comment: " Because the VDRL was Non Reactive, the VDRL titer was not   performed.      ASSESS:    HIV- hx of intermittent compliance, seems has been off since April, lab results pending. Has been restarted on dolutegravir/epivir/abacavir. Not clear if he will be compliant once leaves hospital    Resolved Syphilis- Treponemal test postive, RPR in serum is negative and CSF VDRL is also negative, No further testing or treatment indicated    Dental abscess with possible bone involvement- needs dental follow-up, at this point switch to Augmentin 875 mg BID for 6 weeks    REC: when  Ready for discharge, provide 2 weeks of Augmentin BID and referrral back to [facility]. No benefit for just take for a week or two, puts him at risk for medication resistance. Needs to first  establish with case workers and physicians there to ensure medication compliance..  Thanks for consult, call if questions

## 2021-10-29 NOTE — CONSULTS
Oral Surgery Consult    [facility] Phys[facility]  [facility]    Reason for Consultation:  Right jaw pain and swelling and other areas of dental pain.     ID/HPI:  The patient is a 36 y.o. male with a history of polysubstance abuse who presented to the [facility] ED on 10-26-21 with altered mental status and complaints of dental pain.  He was intubated and admitted to the ICU.  He has cleared and has been extubated. He has complained of severe dental pain, was started on antibiotics, and feels better now.  He has been transferred to the Hospitalist service and an Oral Surgery consultation was called.  Multiple carious teeth noted on maxillofacial CT.   A request for dental extractions and incision and drainage was requested prior to discharge to prevent worsening infection.      Medical History:  HIV, polysubstance abuse, pulmonary embolism.    Surgical History:  Denies    Medications:  Epzicon, Tivicay (HIV meds)    Allergies:  NKDA    Social History:  Pt lives in [place].  Smoker of 1/2 ppd, Etoh occasional, meth and marijuana occasional.      Family history:  Father HTN, Mother HTN and hypothyroidism.    ROS:  Malaise.  Negative for CV, Pulm, GI, , Neuro, or Endocrine disease.   Denies headache, dizziness, CP, SOB.  No recent difficulty swallowing, fever, and chills.      Examination: AF, VSSN  General:  Well developed, well nourished, age appropriate, no distress.  Wife at bedside.  The oropharynx is clear without shift of midline structures.  The floor of mouth without tongue elevation.  Submandibular:  Soft and nontender.  Maxillofacial: Multiple decayed teeth.  Anterior gingival swelling. No paresthesia.    Chest:  Symmetric chest rise.  No tenderness.  CV:  RRR without murmur by report.  Lungs:  Breathing clear and unlabored.    Abdomen:  Soft, nontender, nondistended.   Extremities:  Warm, without edema, peripheral pulses present.  Neurologic:  CN II-XII grossly intact  bilaterally.  Normal mentation and conversation.  Nonfocal exam.      Labs:    1. CBC with WBC 7.1, Hct 41, Plt 179  2. BMP with glucose 106    Imagin.  Maxillofacial CT - shows no acute swelling.  Multiple teeth with coronal decay and apical osteolysis.    2.  Panoramic Mandible - Ordered and pending.    Assessment/Plan:  This 36 year old HIV positive man with extensive dental decay has developed an acute dental infection without obvious abscess formation.  The patient is admitted for IV antibiotics and will require surgical treatment to include incision and drainage of abscess if present and extraction of decayed and unrestorable teeth including the ones causing pain.  The patient is amenable to this treatment recommendation and wishes to proceed as discussed.  He will need to come off Lovenox and be NPO after midnight.  He is scheduled tomorrow at the [facility].  Consent ordered.      Consent discussion:  The procedure, risks, benefits, and alternatives have been described and discussed in detail.  Pt understands and wishes to proceed.      Postoperative Disposition:  Admission to the floor by the [facility]ist for observation, pain control, and IV antibiotics.  I will follow closely during the hospital course and advise on drain removal, discharge instructions, and antibiotics.      Demetri Perez DDS, MD

## 2021-10-29 NOTE — PROGRESS NOTES
Assumed care of patient at 0715, received bedside report from night shift RN. Bed is locked and in lowest position with call light within reach. Treaded socks in place. Patient updated on plan of care, no complaints or pain at this time. White board updated. Pt A&O4. Patient's breathing pattern is unlabored. All needs met at this time.

## 2021-10-29 NOTE — CONSULTS
Hospital Medicine Consultation    Date of Service  10/29/2021    Referring Physician  Chiki Burton M.D.    Consulting Physician  Enoch Lemus M.D.    Reason for Consultation  Take over medical care    History of Presenting Illness  36 y.o. male who presented 10/26/2021 with history of HIV methamphetamine use presented with dental pain he has plans for going back to stable pulmonary the hospitalist at [facility] and I just picked up the patient in June of her headache.  This information is 36 years old he he has HIV history of meth use presented with dental pain and mental status change was sedated and required intubation for airway protection.  The patient was extubated on 10/27/2021 was resistant to Precedex which has been weaned off today.  On examination is complaining of dental pain.  He denies dyspnea.  He denies nausea he denies any acute changes in vision.. On presentation he had a CT of the head and maxillofacial which revealed a right lateral incisor periapical abscess eroding through the anterior alveolar bone and incidental finding of calcification of the right optic nerve suggestive of meningioma.    Review of Systems  Review of Systems   Constitutional: Positive for malaise/fatigue. Negative for fever.   Gastrointestinal: Negative for abdominal pain, nausea and vomiting.   All other systems reviewed and are negative.      Past Medical History   has a past medical history of Infectious disease and Pulmonary embolism (HCC).    Surgical History  Reviewed not pertinent to the presenting problem    Family History  family history includes Hypertension in his father and mother; Thyroid in his mother.    Social History   reports that he has been smoking cigarettes. He has been smoking about 0.50 packs per day. He has quit using smokeless tobacco. He reports current alcohol use. He reports previous drug use. Drug: Inhaled.    Medications  Prior to Admission Medications   Prescriptions Last Dose Informant  Patient Reported? Taking?   abacavir-lamivudine (EPZICOM) 600-300 MG per tablet unk at [facility] Historical No No   Sig: Take 1 tablet by mouth every morning   dolutegravir (TIVICAY) 50 MG Tab tablet unk at [facility] Historical No No   Sig: Take 1 tablet by mouth every morning.      Facility-Administered Medications: None       Allergies  No Active Allergies    Physical Exam  Temp:  [36.5 °C (97.7 °F)-37.1 °C (98.8 °F)] 36.5 °C (97.7 °F)  Pulse:  [81-89] 84  Resp:  [17-18] 17  BP: (102-140)/(63-88) 124/81  SpO2:  [91 %-99 %] 94 %    Physical Exam  Vitals and nursing note reviewed.   Constitutional:       Appearance: He is well-developed. He is not diaphoretic.   HENT:      Head: Normocephalic.      Mouth/Throat:      Pharynx: No oropharyngeal exudate.      Comments: Dental caries  Eyes:      General: No scleral icterus.        Right eye: No discharge.         Left eye: No discharge.      Conjunctiva/sclera: Conjunctivae normal.      Pupils: Pupils are equal, round, and reactive to light.   Neck:      Vascular: No JVD.      Trachea: No tracheal deviation.   Cardiovascular:      Rate and Rhythm: Normal rate and regular rhythm.      Heart sounds: No murmur heard.   No friction rub. No gallop.    Pulmonary:      Effort: Pulmonary effort is normal. No respiratory distress.      Breath sounds: Normal breath sounds. No stridor. No wheezing.   Chest:      Chest wall: No tenderness.   Abdominal:      General: Bowel sounds are normal. There is no distension.      Palpations: Abdomen is soft.      Tenderness: There is no abdominal tenderness. There is no rebound.   Musculoskeletal:         General: No tenderness.      Cervical back: Neck supple.   Skin:     General: Skin is warm and dry.      Nails: There is no clubbing.   Neurological:      Mental Status: He is alert and oriented to person, place, and time.      Cranial Nerves: No cranial nerve deficit.      Motor: No abnormal muscle tone.   Psychiatric:         Behavior: Behavior  normal.         Fluids  Date 10/29/21 0700 - 10/30/21 0659   Shift 9911-1508 3897-5539 5843-0442 24 Hour Total   INTAKE   P.O. 240   240   Enteral 30   30   Shift Total 270   270   OUTPUT   Urine 1000   1000   Shift Total 1000   1000   Weight (kg) 100.2 100.2 100.2 100.2       Laboratory  Recent Labs     10/27/21  0330 10/28/21  0430 10/29/21  0431   WBC 7.2 8.8 7.1   RBC 4.36* 4.35* 4.55*   HEMOGLOBIN 13.7* 13.8* 14.3   HEMATOCRIT 40.7* 39.8* 41.0*   MCV 93.3 91.5 90.1   MCH 31.4 31.7 31.4   MCHC 33.7 34.7 34.9   RDW 43.2 40.2 38.5   PLATELETCT 150* 178 179   MPV 10.0 9.9 10.1     Recent Labs     10/27/21  0330 10/28/21  0430 10/29/21  0431   SODIUM 134* 133* 132*   POTASSIUM 4.3 4.4 4.2   CHLORIDE 102 100 97   CO2 22 24 24   GLUCOSE 89 134* 106*   BUN 12 12 8   CREATININE 0.77 0.72 0.70   CALCIUM 8.4* 8.3* 8.8              Recent Labs     10/29/21  0431   TRIGLYCERIDE 91        Imaging  DX-CHEST-PORTABLE (1 VIEW)   Final Result         1.  Pulmonary edema and/or infiltrates are identified, which appear somewhat increased since the prior exam.      DX-CHEST-PORTABLE (1 VIEW)   Final Result         1.  No acute cardiopulmonary disease.      DX-ABDOMEN FOR TUBE PLACEMENT   Final Result      Enteric tube projects over the stomach.      CT-HEAD W/O   Final Result         1.  No acute intracranial abnormality.      CT-MAXILLOFACIAL WITH PLUS RECONS   Final Result         1.  Right lateral incisor periapical abscess eroding through the anterior alveolar bone   2.  Calcification the right optic nerve, appearance suggests meningioma, referral to ophthalmologist for further workup and evaluation recommended.      DX-CHEST-PORTABLE (1 VIEW)   Final Result         1.  No acute cardiopulmonary disease.      [id number]    (Results Pending)       Assessment/Plan  * Altered mental state- (present on admission)  Assessment & Plan  Likely secondary to alcohol withdrawal  Clinically improved  Counseled on  cessation    Meningioma (HCC)  Assessment & Plan  Reviewed with patient incidental finding of optic nerve calcifications  Discussed need for outpatient follow-up with ophthalmology/ophthalmology    Dental abscess with possible bony erosion/osteo  Assessment & Plan  Given concern for bone involvement with osteomyelitis Dr. Up from infectious disease recommending Augmentin for 6 weeks.  Given immunosuppressed status poor access to dental care I have consulted oral surgery, Dr. Perez has graciously accepted to evaluate the patient in consultation.  We will check panoramic mandibular x-ray and keep him n.p.o. until he was evaluated by oral surgery      Acute respiratory failure (HCC)  Assessment & Plan  Patient tolerated extubation on 10/27/2021  Incentive spirometry use and RT protocol    Symptomatic HIV infection (HCC)?- (present on admission)  Assessment & Plan  Patient evaluated by infectious disease Dr. Up with recommendation to reestablish at [facility] after discharge to discuss further treatment  Reviewed with patient importance of following up and discussed risk of noncompliance with follow-up and antiretroviral therapy with increased risk for resistance    Pulmonary embolism (HCC)- (present on admission)  Assessment & Plan  History of      Polysubstance overdose- (present on admission)  Assessment & Plan  Mr. Berkowitz was here with a suspected overdose of T43.6 - Psychostimulants or amphetamines; he has no other known overdoses.

## 2021-10-29 NOTE — PROGRESS NOTES
Critical Care Progress Note    Date of admission  10/26/2021    Chief Complaint  36-year-old male with a history of HIV presented with tooth pain and abrupt change in mental status necessitating sedation with subsequent depressed GCS and failure to protect his airway resulting in mechanical ventilation. (Dr Smith HPI)    Hospital Course    10/27 - extubated, Prop -> DEX/ativan for WDs, Unasyn  10/28 - weaning dex and ativan, unasyn    Interval Problem Update  Reviewed last 24 hour events:    A&O x4  Tooth pain better  Shakes better  Slept last night  Dexmedetomidine weaned off by this a.m.  Ativan tolerated 1 mg every 6, no as needed use  Hemodynamics acceptable  Weaned to room air  No further hypoglycemia  Afebrile and WBC normal  Cultures remain negative  Mild hyponatremia unchanged  Augmentin  Tivicay/Ziagen/Epivir      Transfer  2 weeks Augmentin  Hep-Lock IV fluids  Will need to see a dentist or oral surgeon at some point  Needs outpatient follow-up with ophthalmology   lined up counseling for substance abuse       YESTERDAY  A&O x3  Tolerated extubation yesterday afternoon  Ativan 1 mg every 4 hours, no as needed Ativan required  Dexmedetomidine actively titrated, drip at 1.2 this a.m. and weaned to 0 point 5 in the afternoon  Patient following but a bit aggressive at times  Afebrile with WBC 8.8  IV Unasyn  Cultures remain negative  Viral load and CD4 count pending-ID following  Mild hyponatremia persists, sodium 133  No further hypoglycemia    Wean Ativan  Wean dexmedetomidine  Pull Chang catheter  Clean up MAR from ventilator medications  Hopefully wean off dexmedetomidine by tomorrow morning and transfer out of unit  Mobilize   consult at some point Re: Outpatient counseling for substance abuse  Transition to oral antibiotics as per ID  HIV medications resumed as per ID    Updated wife and mother at great length at bedside, had excellent long conversation with him.  Described  initial events in the ER as I read them in the chart.  Outlined weaning off the ventilator process and how he is doing better.  They describe his substance abuse with amphetamines and alcohol and described him refusing to take his HIV meds unfortunately.  I also outlined the results of the CT showing the tooth abscess with perhaps some bony involvement and the possible right optic nerve meningioma for which he will need an outpatient ophthalmology eval.  Perhaps that can be lined up through the [facility] clinic?    Review of Systems  Review of Systems   Constitutional: Positive for malaise/fatigue (Better). Negative for chills and fever.   HENT: Negative for congestion and sore throat.    Eyes: Negative.    Respiratory: Negative for cough and shortness of breath.    Cardiovascular: Negative for chest pain and palpitations.   Gastrointestinal: Negative for abdominal pain, diarrhea, nausea and vomiting.   Genitourinary: Negative for dysuria.   Neurological: Negative for focal weakness and headaches.   Psychiatric/Behavioral: Negative for hallucinations.        Vital Signs for last 24 hours   Temp:  [36.4 °C (97.6 °F)-37.1 °C (98.8 °F)] 36.4 °C (97.6 °F)  Pulse:  [81-89] 81  Resp:  [17-18] 18  BP: (118-128)/(72-86) 125/76  SpO2:  [93 %-99 %] 96 %    Hemodynamic parameters for last 24 hours       Respiratory Information for the last 24 hours       Physical Exam   Physical Exam  Vitals reviewed.   Constitutional:       Appearance: He is normal weight. He is not ill-appearing, toxic-appearing or diaphoretic.      Interventions: He is sedated.   HENT:      Head: Normocephalic and atraumatic.      Mouth/Throat:      Mouth: Mucous membranes are moist.   Eyes:      General: No scleral icterus.     Pupils: Pupils are equal, round, and reactive to light.   Cardiovascular:      Rate and Rhythm: Normal rate and regular rhythm.      Pulses: Normal pulses.      Heart sounds: Normal heart sounds. No murmur heard.   No gallop.        Comments: SR  Pulmonary:      Effort: No accessory muscle usage or respiratory distress.      Breath sounds: No wheezing, rhonchi or rales.   Abdominal:      General: Bowel sounds are normal.      Palpations: Abdomen is soft. There is no mass.      Tenderness: There is no abdominal tenderness. There is no right CVA tenderness, left CVA tenderness or guarding.   Musculoskeletal:      Cervical back: Neck supple. No rigidity.      Right lower leg: No edema.      Left lower leg: No edema.   Lymphadenopathy:      Cervical: No cervical adenopathy.   Skin:     General: Skin is warm and dry.      Capillary Refill: Capillary refill takes less than 2 seconds.      Coloration: Skin is not cyanotic, jaundiced or mottled.      Nails: There is no clubbing.   Neurological:      General: No focal deficit present.      Mental Status: He is alert.      GCS: GCS eye subscore is 4. GCS verbal subscore is 5. GCS motor subscore is 6.      Motor: No tremor.      Comments: Calm, follows well   Psychiatric:         Mood and Affect: Mood normal. Mood is not anxious.         Speech: Speech normal.         Behavior: Behavior is not agitated. Behavior is cooperative.         Thought Content: Thought content normal.         Cognition and Memory: Cognition normal.         Medications  Current Facility-Administered Medications   Medication Dose Route Frequency Provider Last Rate Last Admin   • D5 NS infusion   Intravenous Continuous Enoch Lemus M.D. 50 mL/hr at 10/29/21 1330 New Bag at 10/29/21 1330   • dolutegravir (TIVICAY) tablet 50 mg  50 mg Oral DAILY Jaxson Up M.D.   50 mg at 10/29/21 0530   • abacavir (Ziagen) tablet 600 mg  600 mg Oral DAILY Jaxson Up M.D.   600 mg at 10/29/21 0529   • lamiVUDine (Epivir) tablet 300 mg  300 mg Oral DAILY Jaxson Up M.D.   300 mg at 10/29/21 0530   • folic acid (FOLVITE) tablet 1 mg  1 mg Oral DAILY Chiki Burton M.D.   1 mg at 10/29/21 0530    And   • multivitamin  (THERAGRAN) tablet 1 Tablet  1 Tablet Oral DAILY Chiki Burton M.D.   1 Tablet at 10/29/21 0530    And   • thiamine (Vitamin B-1) tablet 100 mg  100 mg Oral DAILY Cihki Burton M.D.   100 mg at 10/29/21 0530   • senna-docusate (PERICOLACE or SENOKOT S) 8.6-50 MG per tablet 2 Tablet  2 Tablet Oral BID Chiki Burton M.D.        And   • polyethylene glycol/lytes (MIRALAX) PACKET 1 Packet  1 Packet Oral QDAY PRN Chiki Burton M.D.        And   • magnesium hydroxide (MILK OF MAGNESIA) suspension 30 mL  30 mL Oral QDAY PRN Chiki Burton M.D.        And   • bisacodyl (DULCOLAX) suppository 10 mg  10 mg Rectal QDAY PRN Chiki Burton M.D.       • glucose 4 g chewable tablet 16 g  16 g Oral Q15 MIN PRN Chiki Burton M.D.        And   • dextrose 50% (D50W) injection 50 mL  50 mL Intravenous Q15 MIN PRN Chiki Burton M.D.       • amoxicillin-clavulanate (AUGMENTIN) 875-125 MG per tablet 1 Tablet  1 Tablet Oral Q12HRS Jaxson Up M.D.   1 Tablet at 10/29/21 1707   • LORazepam (ATIVAN) injection 1 mg  1 mg Intravenous Q6HR Chiki Burton M.D.   1 mg at 10/29/21 1707   • acetaminophen (TYLENOL) tablet 500 mg  500 mg Enteral Tube Q6HRS PRN Chiki Burton M.D.   500 mg at 10/27/21 2039   • LORazepam (ATIVAN) injection 1-2 mg  1-2 mg Intravenous Q2HRS PRN Chiki Burton M.D.       • oxyCODONE immediate-release (ROXICODONE) tablet 5 mg  5 mg Oral Q6HRS PRN DAYTON KochPStephonRStephonNStephon   5 mg at 10/29/21 1550   • Respiratory Therapy Consult   Nebulization Continuous RT Emiliano Lea M.D.       • MD Alert...ICU Electrolyte Replacement per Pharmacy   Other PHARMACY TO DOSE Emiliano Lea M.D.       • ipratropium-albuterol (DUONEB) nebulizer solution  3 mL Nebulization Q2HRS PRN (RT) Emiliano Lea M.D.       • enoxaparin (LOVENOX) inj 40 mg  40 mg Subcutaneous DAILY Emiliano Lea M.D.   40 mg at 10/29/21 0530       Fluids    Intake/Output Summary (Last 24 hours) at  10/29/2021 1912  Last data filed at 10/29/2021 1315  Gross per 24 hour   Intake 656.55 ml   Output 3600 ml   Net -2943.45 ml       Laboratory  Recent Labs     10/26/21  2053   ISTATAPH 7.419   ISTATAPCO2 41.3*   ISTATAPO2 77   ISTATATCO2 28   TUYYLHY3ATM 95   ISTATARTHCO3 26.7*   ISTATARTBE 2   ISTATTEMP 98.0 F   ISTATFIO2 30   ISTATSPEC Arterial   ISTATAPHTC 7.424   HPASOOLF3OK 75         Recent Labs     10/27/21  0330 10/28/21  0430 10/29/21  0431   SODIUM 134* 133* 132*   POTASSIUM 4.3 4.4 4.2   CHLORIDE 102 100 97   CO2 22 24 24   BUN 12 12 8   CREATININE 0.77 0.72 0.70   MAGNESIUM 1.8 2.3 2.1   PHOSPHORUS 3.4 2.8 3.8   CALCIUM 8.4* 8.3* 8.8     Recent Labs     10/27/21  0330 10/27/21  1205 10/28/21  0430 10/29/21  0431   PREALBUMIN 22.2 21.6  --   --    GLUCOSE 89  --  134* 106*     Recent Labs     10/27/21  0330 10/28/21  0430 10/29/21  0431   WBC 7.2 8.8 7.1   NEUTSPOLYS 72.30* 78.80* 66.30   LYMPHOCYTES 19.30* 13.40* 22.90   MONOCYTES 7.00 6.90 10.10   EOSINOPHILS 0.70 0.30 0.30   BASOPHILS 0.10 0.10 0.10     Recent Labs     10/27/21  0330 10/28/21  0430 10/29/21  0431   RBC 4.36* 4.35* 4.55*   HEMOGLOBIN 13.7* 13.8* 14.3   HEMATOCRIT 40.7* 39.8* 41.0*   PLATELETCT 150* 178 179       Imaging  X-Ray:  I have personally reviewed the images and compared with prior images.  EKG:  I have personally reviewed the images and compared with prior images.  CT:    Reviewed    Assessment/Plan  * Altered mental state- (present on admission)  Assessment & Plan  Agitated requiring sedation in ER  Nonfocal exam and does participate at times  CT head negative except for right optic nerve calcification seen on maxillofacial CT  Lumbar puncture negative  UDS positive for THC and amphetamines  Family described significant history of alcohol consumption  Severe agitation markedly improved, weaning Ativan and dexmedetomidine off  Rally bag/vitamins-optimize electrolytes  Strongly suspect withdrawals from alcohol/amphetamines  Back  to baseline    Dental abscess with possible bony erosion/osteo  Assessment & Plan  Right upper incisor dental abscess with bony involvement on maxillofacial CT  IV Unasyn transitioned to Augmentin, 2 more weeks Rx per ID  Likely to benefit from oral surgeon at some point and dental extraction?  Severe dental caries noted on CT as well  Inpatient eval versus follow-up through [facility]?  Defer to ID    Symptomatic HIV infection (HCC)?- (present on admission)  Assessment & Plan  Family informed us that he has been off HIV meds for months  Reassess serologically, CD4 count/viral load pending still  Resuming HIV regimen per ID Tiagen/Tivicay/Epivir  Patient to follow-up at [facility]      Hypoglycemia  Assessment & Plan  Mild, transient-no recurrence, off IV fluids  Hypoglycemia protocols  Query malnutrition  No clear medication etiology on review with clinical pharmacist  Intravenous dextrose/nutrition seem to have resolved this problem for now, continue to monitor    Meningioma (HCC)  Assessment & Plan  Maxillofacial CT revealed right optic nerve calcification measuring 3.6 mm  Appearance suggestive of meningioma per radiology  At some point repeat imaging and evaluation by neuro-ophthalmology?  Versus neurosurgery  Reviewed results of films with wife and mother at the bedside, encourage them to help facilitate patient following up as an outpatient 10/28  Informed patient of these results 10/29    Acute respiratory failure (HCC)  Assessment & Plan  Severe agitation requiring sedation unfortunately prompted intubation to protect airway-intubated in ER 10/26  Extubated 10/27  RT protocols  Continue incentive spirometry  Mobilize as tolerated      Polysubstance overdose- (present on admission)  Assessment & Plan  History of dating back many years  UDS positive for amphetamines and cannabinoids  Per family significant EtOH use-half a gallon a day  DC Rally bag/enteral rally vitamins  Dexmedetomidine weaned off a.m.  10/29  Significantly improved withdrawals from alcohol and amphetamines  Ativan at 1 mg every 6 hours, may be ready for as needed only   consult for arranging outpatient counseling if patient willing    Pulmonary embolism (HCC)- (present on admission)  Assessment & Plan  History of PE 10/2016  Lovenox prophylaxis ongoing  Monitor       VTE:  Lovenox  Ulcer: H2 Antagonist  Lines: Chang Catheter  Ongoing indication addressed    I have performed a physical exam and reviewed and updated ROS and Plan today (10/29/2021). In review of yesterday's note (10/28/2021), there are no changes except as documented above.     Discussed patient condition and risk of morbidity and/or mortality with Hospitalist, RN, RT, Pharmacy, Charge nurse / hot rounds and Patient     Case reviewed with Dr. Cassidy Lemus

## 2021-10-29 NOTE — DOCUMENTATION QUERY
"                                                                         [facility]                                                                       Query Response Note      PATIENT:               EARL MCDONNELL  ACCT #:                  [id number]  MRN:                     [id number]  :                      1985  ADMIT DATE:       10/26/2021 12:25 AM  DISCH DATE:          RESPONDING  PROVIDER #:        [id number]           QUERY TEXT:    Encephalopathy \"likely a toxic encephalopathy\" is documented in the ED Note however the specificity of 'toxic' is not documented in subsequent notes. Can the type of encephalopathy be clarified?    NOTE:  If an appropriate response is not listed below, please respond with a new note.    The patient's Clinical Indicators include:  ED: ALOC, combative. UDS + amphetamines/cannabinoids, likely a toxic encephalopathy  H&P: Encephalopathy, UDS pos. meth. Dental abscess; acute resp failure w/ hypoxia  10/26 CT-Head: No acute intracranial abnormality.  10/26 UDS: POS - Cannabinoids & amphetamines  10/27 CC PN: Polysubstance overdose; encephalopathy; agitated delirium; positive syphilis  Treatment: IVF; CT-Head; antibiotics; O2; intubated; lab testing  Risk Factors: Meth/THC use/OD; acute resp failure w/ hypoxia; dental abscess    Thank You,  Cassidy Nava RN  Clinical    Connect via [employer]  Options provided:   -- Toxic encephalopathy is ruled in   -- Encephalopathy, (Please specify type)   -- Unable to determine      Query created by: Cassidy Nava on 10/28/2021 9:32 AM    RESPONSE TEXT:    Toxic encephalopathy is ruled in          Electronically signed by:  ELI GERARDO MD 10/28/2021 6:29 PM              "

## 2021-10-29 NOTE — DISCHARGE PLANNING
Care Transition Team Assessment  In case of emergency contact KATERIAN Romero @ [phone]    LSW met with patient at bedside to complete CTT assessment. Patient reports that he lives with his mother and brother. Patient does not have a PCP. LSW added information to [facility] and [facility] to  [facility]. Patient uses [facility] on [address] for rx.  Patient was independent prior to admission. Patient reports current use of marijuana. Patient was positive for amphetamines and marijuana on admission.    Information Source  Orientation Level: Oriented X4  Information Given By: Patient  Who is responsible for making decisions for patient? : Patient    Readmission Evaluation  Is this a readmission?: No    Elopement Risk  Legal Hold: No  Ambulatory or Self Mobile in Wheelchair: Yes  Disoriented: No  Psychiatric Symptoms: None  History of Wandering: No  Elopement this Admit: No  Vocalizing Wanting to Leave: No  Displays Behaviors, Body Language Wanting to Leave: No-Not at Risk for Elopement  Elopement Risk: Not at Risk for Elopement    Discharge Preparedness  What is your plan after discharge?: Home with help  What are your discharge supports?: Parent, Partner  Prior Functional Level: Ambulatory, Drives Self, Independent with Activities of Daily Living, Independent with Medication Management  Difficulity with ADLs: None  Difficulity with IADLs: None    Functional Assesment  Prior Functional Level: Ambulatory, Drives Self, Independent with Activities of Daily Living, Independent with Medication Management    Finances  Financial Barriers to Discharge: No  Prescription Coverage: Yes    Advance Directive  Advance Directive?: None    Psychological Assessment  History of Substance Abuse: Amphetamines, Marijuana  Date Last Used - Amphetamines: 10/26/21  Date Last Used - Marijuana: 10/26/21  Substance Abuse Comments: only admits to marijuana  History of Psychiatric Problems: No  Non-compliant with Treatment: No  Newly Diagnosed Illness:  Yes    Discharge Risks or Barriers  Discharge risks or barriers?: No PCP, Uninsured / underinsured, Substance abuse  Patient risk factors: No PCP, Substance abuse, Uninsured or underinsured    Anticipated Discharge Information  Discharge Disposition: Discharged to home/self care (01)  Discharge Address: [address] [place] [address]

## 2021-10-30 ENCOUNTER — ANESTHESIA (OUTPATIENT)
Dept: SURGERY | Facility: MEDICAL CENTER | Age: 36
DRG: 917 | End: 2021-10-30
Payer: MEDICAID

## 2021-10-30 PROBLEM — A53.9 SYPHILIS: Status: ACTIVE | Noted: 2021-10-30

## 2021-10-30 PROBLEM — J96.01 ACUTE RESPIRATORY FAILURE WITH HYPOXIA (HCC): Status: ACTIVE | Noted: 2021-10-27

## 2021-10-30 PROBLEM — Z91.199 NONCOMPLIANCE: Status: ACTIVE | Noted: 2021-10-30

## 2021-10-30 LAB
ANION GAP SERPL CALC-SCNC: 12 MMOL/L (ref 7–16)
BASOPHILS # BLD AUTO: 0.1 % (ref 0–1.8)
BASOPHILS # BLD: 0.01 K/UL (ref 0–0.12)
BUN SERPL-MCNC: 8 MG/DL (ref 8–22)
CALCIUM SERPL-MCNC: 9 MG/DL (ref 8.5–10.5)
CHLORIDE SERPL-SCNC: 100 MMOL/L (ref 96–112)
CO2 SERPL-SCNC: 23 MMOL/L (ref 20–33)
CREAT SERPL-MCNC: 0.71 MG/DL (ref 0.5–1.4)
EOSINOPHIL # BLD AUTO: 0.06 K/UL (ref 0–0.51)
EOSINOPHIL NFR BLD: 0.9 % (ref 0–6.9)
ERYTHROCYTE [DISTWIDTH] IN BLOOD BY AUTOMATED COUNT: 37.9 FL (ref 35.9–50)
GLUCOSE SERPL-MCNC: 106 MG/DL (ref 65–99)
HCT VFR BLD AUTO: 43.5 % (ref 42–52)
HGB BLD-MCNC: 15.5 G/DL (ref 14–18)
IMM GRANULOCYTES # BLD AUTO: 0.02 K/UL (ref 0–0.11)
IMM GRANULOCYTES NFR BLD AUTO: 0.3 % (ref 0–0.9)
LYMPHOCYTES # BLD AUTO: 1.57 K/UL (ref 1–4.8)
LYMPHOCYTES NFR BLD: 22.6 % (ref 22–41)
MCH RBC QN AUTO: 31.3 PG (ref 27–33)
MCHC RBC AUTO-ENTMCNC: 35.6 G/DL (ref 33.7–35.3)
MCV RBC AUTO: 87.9 FL (ref 81.4–97.8)
MONOCYTES # BLD AUTO: 0.81 K/UL (ref 0–0.85)
MONOCYTES NFR BLD AUTO: 11.6 % (ref 0–13.4)
NEUTROPHILS # BLD AUTO: 4.49 K/UL (ref 1.82–7.42)
NEUTROPHILS NFR BLD: 64.5 % (ref 44–72)
NRBC # BLD AUTO: 0 K/UL
NRBC BLD-RTO: 0 /100 WBC
PLATELET # BLD AUTO: 217 K/UL (ref 164–446)
PMV BLD AUTO: 10.4 FL (ref 9–12.9)
POTASSIUM SERPL-SCNC: 3.8 MMOL/L (ref 3.6–5.5)
RBC # BLD AUTO: 4.95 M/UL (ref 4.7–6.1)
SODIUM SERPL-SCNC: 135 MMOL/L (ref 135–145)
WBC # BLD AUTO: 7 K/UL (ref 4.8–10.8)

## 2021-10-30 PROCEDURE — 501838 HCHG SUTURE GENERAL: Performed by: ORAL & MAXILLOFACIAL SURGERY

## 2021-10-30 PROCEDURE — 700105 HCHG RX REV CODE 258: Performed by: FAMILY MEDICINE

## 2021-10-30 PROCEDURE — 500380 HCHG DRAIN, PENROSE 1/4X12: Performed by: ORAL & MAXILLOFACIAL SURGERY

## 2021-10-30 PROCEDURE — 700105 HCHG RX REV CODE 258: Performed by: ANESTHESIOLOGY

## 2021-10-30 PROCEDURE — 160002 HCHG RECOVERY MINUTES (STAT): Performed by: ORAL & MAXILLOFACIAL SURGERY

## 2021-10-30 PROCEDURE — 700101 HCHG RX REV CODE 250: Performed by: ANESTHESIOLOGY

## 2021-10-30 PROCEDURE — 80048 BASIC METABOLIC PNL TOTAL CA: CPT

## 2021-10-30 PROCEDURE — 0CDWXZ1 EXTRACTION OF UPPER TOOTH, MULTIPLE, EXTERNAL APPROACH: ICD-10-PCS | Performed by: ORAL & MAXILLOFACIAL SURGERY

## 2021-10-30 PROCEDURE — 0CTX0Z0 RESECTION OF LOWER TOOTH, SINGLE, OPEN APPROACH: ICD-10-PCS | Performed by: ORAL & MAXILLOFACIAL SURGERY

## 2021-10-30 PROCEDURE — 85025 COMPLETE CBC W/AUTO DIFF WBC: CPT

## 2021-10-30 PROCEDURE — 700101 HCHG RX REV CODE 250: Performed by: ORAL & MAXILLOFACIAL SURGERY

## 2021-10-30 PROCEDURE — A9270 NON-COVERED ITEM OR SERVICE: HCPCS | Performed by: INTERNAL MEDICINE

## 2021-10-30 PROCEDURE — 770001 HCHG ROOM/CARE - MED/SURG/GYN PRIV*

## 2021-10-30 PROCEDURE — 160035 HCHG PACU - 1ST 60 MINS PHASE I: Performed by: ORAL & MAXILLOFACIAL SURGERY

## 2021-10-30 PROCEDURE — 160009 HCHG ANES TIME/MIN: Performed by: ORAL & MAXILLOFACIAL SURGERY

## 2021-10-30 PROCEDURE — 700102 HCHG RX REV CODE 250 W/ 637 OVERRIDE(OP): Performed by: NURSE PRACTITIONER

## 2021-10-30 PROCEDURE — 160048 HCHG OR STATISTICAL LEVEL 1-5: Performed by: ORAL & MAXILLOFACIAL SURGERY

## 2021-10-30 PROCEDURE — 700102 HCHG RX REV CODE 250 W/ 637 OVERRIDE(OP): Performed by: INTERNAL MEDICINE

## 2021-10-30 PROCEDURE — 700111 HCHG RX REV CODE 636 W/ 250 OVERRIDE (IP): Performed by: INTERNAL MEDICINE

## 2021-10-30 PROCEDURE — 700111 HCHG RX REV CODE 636 W/ 250 OVERRIDE (IP): Performed by: ANESTHESIOLOGY

## 2021-10-30 PROCEDURE — 160038 HCHG SURGERY MINUTES - EA ADDL 1 MIN LEVEL 2: Performed by: ORAL & MAXILLOFACIAL SURGERY

## 2021-10-30 PROCEDURE — 36415 COLL VENOUS BLD VENIPUNCTURE: CPT

## 2021-10-30 PROCEDURE — A9270 NON-COVERED ITEM OR SERVICE: HCPCS | Performed by: NURSE PRACTITIONER

## 2021-10-30 PROCEDURE — 99233 SBSQ HOSP IP/OBS HIGH 50: CPT | Performed by: FAMILY MEDICINE

## 2021-10-30 PROCEDURE — 160027 HCHG SURGERY MINUTES - 1ST 30 MINS LEVEL 2: Performed by: ORAL & MAXILLOFACIAL SURGERY

## 2021-10-30 RX ORDER — ROCURONIUM BROMIDE 10 MG/ML
INJECTION, SOLUTION INTRAVENOUS PRN
Status: DISCONTINUED | OUTPATIENT
Start: 2021-10-30 | End: 2021-10-30 | Stop reason: SURG

## 2021-10-30 RX ORDER — OXYCODONE HCL 5 MG/5 ML
10 SOLUTION, ORAL ORAL
Status: DISCONTINUED | OUTPATIENT
Start: 2021-10-30 | End: 2021-10-30 | Stop reason: HOSPADM

## 2021-10-30 RX ORDER — MIDAZOLAM HYDROCHLORIDE 1 MG/ML
INJECTION INTRAMUSCULAR; INTRAVENOUS PRN
Status: DISCONTINUED | OUTPATIENT
Start: 2021-10-30 | End: 2021-10-30 | Stop reason: SURG

## 2021-10-30 RX ORDER — LIDOCAINE HYDROCHLORIDE AND EPINEPHRINE 10; 10 MG/ML; UG/ML
INJECTION, SOLUTION INFILTRATION; PERINEURAL
Status: DISCONTINUED | OUTPATIENT
Start: 2021-10-30 | End: 2021-10-30 | Stop reason: HOSPADM

## 2021-10-30 RX ORDER — SUCCINYLCHOLINE CHLORIDE 20 MG/ML
INJECTION INTRAMUSCULAR; INTRAVENOUS PRN
Status: DISCONTINUED | OUTPATIENT
Start: 2021-10-30 | End: 2021-10-30 | Stop reason: SURG

## 2021-10-30 RX ORDER — SODIUM CHLORIDE, SODIUM LACTATE, POTASSIUM CHLORIDE, CALCIUM CHLORIDE 600; 310; 30; 20 MG/100ML; MG/100ML; MG/100ML; MG/100ML
INJECTION, SOLUTION INTRAVENOUS
Status: DISCONTINUED | OUTPATIENT
Start: 2021-10-30 | End: 2021-10-30 | Stop reason: SURG

## 2021-10-30 RX ORDER — HALOPERIDOL 5 MG/ML
1 INJECTION INTRAMUSCULAR
Status: DISCONTINUED | OUTPATIENT
Start: 2021-10-30 | End: 2021-10-30 | Stop reason: HOSPADM

## 2021-10-30 RX ORDER — ONDANSETRON 2 MG/ML
4 INJECTION INTRAMUSCULAR; INTRAVENOUS
Status: DISCONTINUED | OUTPATIENT
Start: 2021-10-30 | End: 2021-10-30 | Stop reason: HOSPADM

## 2021-10-30 RX ORDER — CEFAZOLIN SODIUM 1 G/3ML
INJECTION, POWDER, FOR SOLUTION INTRAMUSCULAR; INTRAVENOUS PRN
Status: DISCONTINUED | OUTPATIENT
Start: 2021-10-30 | End: 2021-10-30

## 2021-10-30 RX ORDER — ONDANSETRON 2 MG/ML
INJECTION INTRAMUSCULAR; INTRAVENOUS PRN
Status: DISCONTINUED | OUTPATIENT
Start: 2021-10-30 | End: 2021-10-30 | Stop reason: SURG

## 2021-10-30 RX ORDER — DIPHENHYDRAMINE HYDROCHLORIDE 50 MG/ML
12.5 INJECTION INTRAMUSCULAR; INTRAVENOUS
Status: DISCONTINUED | OUTPATIENT
Start: 2021-10-30 | End: 2021-10-30 | Stop reason: HOSPADM

## 2021-10-30 RX ORDER — DEXAMETHASONE SODIUM PHOSPHATE 4 MG/ML
INJECTION, SOLUTION INTRA-ARTICULAR; INTRALESIONAL; INTRAMUSCULAR; INTRAVENOUS; SOFT TISSUE PRN
Status: DISCONTINUED | OUTPATIENT
Start: 2021-10-30 | End: 2021-10-30 | Stop reason: SURG

## 2021-10-30 RX ORDER — OXYCODONE HCL 5 MG/5 ML
5 SOLUTION, ORAL ORAL
Status: DISCONTINUED | OUTPATIENT
Start: 2021-10-30 | End: 2021-10-30 | Stop reason: HOSPADM

## 2021-10-30 RX ORDER — ACETAMINOPHEN 500 MG
500 TABLET ORAL EVERY 6 HOURS PRN
Status: DISCONTINUED | OUTPATIENT
Start: 2021-10-30 | End: 2021-10-31 | Stop reason: HOSPADM

## 2021-10-30 RX ORDER — MEPERIDINE HYDROCHLORIDE 25 MG/ML
12.5 INJECTION INTRAMUSCULAR; INTRAVENOUS; SUBCUTANEOUS
Status: DISCONTINUED | OUTPATIENT
Start: 2021-10-30 | End: 2021-10-30 | Stop reason: HOSPADM

## 2021-10-30 RX ORDER — HYDROMORPHONE HYDROCHLORIDE 1 MG/ML
0.6 INJECTION, SOLUTION INTRAMUSCULAR; INTRAVENOUS; SUBCUTANEOUS
Status: DISCONTINUED | OUTPATIENT
Start: 2021-10-30 | End: 2021-10-30 | Stop reason: HOSPADM

## 2021-10-30 RX ORDER — LIDOCAINE HYDROCHLORIDE 40 MG/ML
SOLUTION TOPICAL PRN
Status: DISCONTINUED | OUTPATIENT
Start: 2021-10-30 | End: 2021-10-30 | Stop reason: SURG

## 2021-10-30 RX ORDER — SODIUM CHLORIDE, SODIUM LACTATE, POTASSIUM CHLORIDE, CALCIUM CHLORIDE 600; 310; 30; 20 MG/100ML; MG/100ML; MG/100ML; MG/100ML
INJECTION, SOLUTION INTRAVENOUS CONTINUOUS
Status: DISCONTINUED | OUTPATIENT
Start: 2021-10-30 | End: 2021-10-30 | Stop reason: HOSPADM

## 2021-10-30 RX ORDER — HYDROMORPHONE HYDROCHLORIDE 1 MG/ML
0.2 INJECTION, SOLUTION INTRAMUSCULAR; INTRAVENOUS; SUBCUTANEOUS
Status: DISCONTINUED | OUTPATIENT
Start: 2021-10-30 | End: 2021-10-30 | Stop reason: HOSPADM

## 2021-10-30 RX ORDER — HYDROMORPHONE HYDROCHLORIDE 1 MG/ML
0.4 INJECTION, SOLUTION INTRAMUSCULAR; INTRAVENOUS; SUBCUTANEOUS
Status: DISCONTINUED | OUTPATIENT
Start: 2021-10-30 | End: 2021-10-30 | Stop reason: HOSPADM

## 2021-10-30 RX ORDER — CEFAZOLIN SODIUM 1 G/3ML
INJECTION, POWDER, FOR SOLUTION INTRAMUSCULAR; INTRAVENOUS PRN
Status: DISCONTINUED | OUTPATIENT
Start: 2021-10-30 | End: 2021-10-30 | Stop reason: SURG

## 2021-10-30 RX ADMIN — ONDANSETRON 4 MG: 2 INJECTION INTRAMUSCULAR; INTRAVENOUS at 13:25

## 2021-10-30 RX ADMIN — DEXAMETHASONE SODIUM PHOSPHATE 4 MG: 4 INJECTION, SOLUTION INTRA-ARTICULAR; INTRALESIONAL; INTRAMUSCULAR; INTRAVENOUS; SOFT TISSUE at 13:25

## 2021-10-30 RX ADMIN — THERA TABS 1 TABLET: TAB at 05:48

## 2021-10-30 RX ADMIN — LORAZEPAM 1 MG: 2 INJECTION INTRAMUSCULAR; INTRAVENOUS at 17:05

## 2021-10-30 RX ADMIN — FENTANYL CITRATE 50 MCG: 50 INJECTION, SOLUTION INTRAMUSCULAR; INTRAVENOUS at 13:34

## 2021-10-30 RX ADMIN — ACETAMINOPHEN 500 MG: 500 TABLET ORAL at 03:01

## 2021-10-30 RX ADMIN — CEFAZOLIN 2 G: 330 INJECTION, POWDER, FOR SOLUTION INTRAMUSCULAR; INTRAVENOUS at 12:52

## 2021-10-30 RX ADMIN — DOLUTEGRAVIR SODIUM 50 MG: 50 TABLET, FILM COATED ORAL at 15:19

## 2021-10-30 RX ADMIN — SUCCINYLCHOLINE CHLORIDE 200 MG: 20 INJECTION, SOLUTION INTRAMUSCULAR; INTRAVENOUS; PARENTERAL at 12:48

## 2021-10-30 RX ADMIN — FENTANYL CITRATE 100 MCG: 50 INJECTION, SOLUTION INTRAMUSCULAR; INTRAVENOUS at 12:47

## 2021-10-30 RX ADMIN — LAMIVUDINE 300 MG: 150 TABLET, FILM COATED ORAL at 15:19

## 2021-10-30 RX ADMIN — ROCURONIUM BROMIDE 5 MG: 10 INJECTION, SOLUTION INTRAVENOUS at 12:47

## 2021-10-30 RX ADMIN — AMOXICILLIN AND CLAVULANATE POTASSIUM 1 TABLET: 875; 125 TABLET, FILM COATED ORAL at 05:48

## 2021-10-30 RX ADMIN — LIDOCAINE HYDROCHLORIDE 4 ML: 40 SOLUTION TOPICAL at 12:49

## 2021-10-30 RX ADMIN — FENTANYL CITRATE 50 MCG: 50 INJECTION, SOLUTION INTRAMUSCULAR; INTRAVENOUS at 13:19

## 2021-10-30 RX ADMIN — OXYCODONE 5 MG: 5 TABLET ORAL at 20:47

## 2021-10-30 RX ADMIN — MIDAZOLAM HYDROCHLORIDE 2 MG: 1 INJECTION, SOLUTION INTRAMUSCULAR; INTRAVENOUS at 12:47

## 2021-10-30 RX ADMIN — AMOXICILLIN AND CLAVULANATE POTASSIUM 1 TABLET: 875; 125 TABLET, FILM COATED ORAL at 17:04

## 2021-10-30 RX ADMIN — ABACAVIR 600 MG: 300 TABLET, FILM COATED ORAL at 15:18

## 2021-10-30 RX ADMIN — DEXTROSE AND SODIUM CHLORIDE: 5; 900 INJECTION, SOLUTION INTRAVENOUS at 15:32

## 2021-10-30 RX ADMIN — PROPOFOL 200 MG: 10 INJECTION, EMULSION INTRAVENOUS at 12:47

## 2021-10-30 RX ADMIN — FOLIC ACID 1 MG: 1 TABLET ORAL at 05:48

## 2021-10-30 RX ADMIN — OXYCODONE 5 MG: 5 TABLET ORAL at 08:26

## 2021-10-30 RX ADMIN — SODIUM CHLORIDE, POTASSIUM CHLORIDE, SODIUM LACTATE AND CALCIUM CHLORIDE: 600; 310; 30; 20 INJECTION, SOLUTION INTRAVENOUS at 12:47

## 2021-10-30 RX ADMIN — LORAZEPAM 1 MG: 2 INJECTION INTRAMUSCULAR; INTRAVENOUS at 23:09

## 2021-10-30 RX ADMIN — Medication 100 MG: at 05:48

## 2021-10-30 RX ADMIN — SENNOSIDES, DOCUSATE SODIUM 2 TABLET: 8.6; 5 TABLET ORAL at 05:48

## 2021-10-30 ASSESSMENT — COGNITIVE AND FUNCTIONAL STATUS - GENERAL
MOBILITY SCORE: 24
DAILY ACTIVITIY SCORE: 24
SUGGESTED CMS G CODE MODIFIER DAILY ACTIVITY: CH
SUGGESTED CMS G CODE MODIFIER MOBILITY: CH

## 2021-10-30 ASSESSMENT — ENCOUNTER SYMPTOMS
BLURRED VISION: 0
SPEECH CHANGE: 0
FOCAL WEAKNESS: 0
VOMITING: 0
NERVOUS/ANXIOUS: 0
WEAKNESS: 0
DIARRHEA: 0
WHEEZING: 0
NAUSEA: 0
BACK PAIN: 0
DIZZINESS: 0
FEVER: 0
CHILLS: 0
SENSORY CHANGE: 0
DIAPHORESIS: 0
HEARTBURN: 0
FLANK PAIN: 0
NECK PAIN: 0
HEADACHES: 0
COUGH: 0
SHORTNESS OF BREATH: 0
PALPITATIONS: 0
SORE THROAT: 0
MYALGIAS: 0
ABDOMINAL PAIN: 0

## 2021-10-30 ASSESSMENT — PAIN DESCRIPTION - PAIN TYPE
TYPE: SURGICAL PAIN
TYPE: ACUTE PAIN
TYPE: SURGICAL PAIN
TYPE: ACUTE PAIN;SURGICAL PAIN
TYPE: SURGICAL PAIN

## 2021-10-30 ASSESSMENT — PAIN SCALES - GENERAL: PAIN_LEVEL: 0

## 2021-10-30 NOTE — PROGRESS NOTES
[facility] Medicine Daily Progress Note    Date of Service  10/30/2021    Chief Complaint  Sagrario Berkowitz is a 36 y.o. male admitted 10/26/2021 with acute encephalopathy.    Hospital Course  Admitted with acute encephalopathy, he was intubated for airway protection.  He initially required admission to the intensive care unit.  Prior to the admission, he was complaining of dental infection.  CT maxillofacial showed Right lateral incisor periapical abscess eroding through the anterior alveolar bone.  He was started on empiric coverage with IV Unasyn.  He was extubated on 10/27/2021.  Lumbar puncture was done which was noted to be negative.  He was negative for RPR but positive for treponemal antibody.  He has known history of HIV, with noncompliance with medications and follow-up.  Infectious disease was consulted on the case.  He was started on HIV medications.  Oral maxillofacial surgery was consulted on the case.    Interval Problem Update  Dental abscess - plan for surgery today  Hypoglycemia -   Respiratory failure - off O2    I have personally seen and examined the patient at bedside. I discussed the plan of care with patient and bedside RN.    Consultants/Specialty  critical care, infectious disease and F surgery    Code Status  Full Code    Disposition  Patient is not medically cleared.   Anticipate discharge to to home with close outpatient follow-up.  I have placed the appropriate orders for post-discharge needs.    Review of Systems  Review of Systems   Constitutional: Negative for chills, diaphoresis, fever and malaise/fatigue.   HENT: Negative for congestion, hearing loss and sore throat.    Eyes: Negative for blurred vision.   Respiratory: Negative for cough, shortness of breath and wheezing.    Cardiovascular: Negative for chest pain, palpitations and leg swelling.   Gastrointestinal: Negative for abdominal pain, diarrhea, heartburn, nausea and vomiting.   Genitourinary: Negative for dysuria, flank  pain and hematuria.   Musculoskeletal: Negative for back pain, joint pain, myalgias and neck pain.   Skin: Negative for rash.   Neurological: Negative for dizziness, sensory change, speech change, focal weakness, weakness and headaches.   Psychiatric/Behavioral: The patient is not nervous/anxious.         Physical Exam  Temp:  [36.4 °C (97.6 °F)-37.1 °C (98.8 °F)] 36.7 °C (98.1 °F)  Pulse:  [81-90] 83  Resp:  [16-18] 18  BP: (121-139)/(76-81) 139/81  SpO2:  [92 %-98 %] 98 %    Physical Exam  Vitals and nursing note reviewed.   HENT:      Head: Normocephalic and atraumatic.      Nose: No congestion.      Mouth/Throat:      Mouth: Mucous membranes are moist.   Eyes:      Extraocular Movements: Extraocular movements intact.      Conjunctiva/sclera: Conjunctivae normal.   Cardiovascular:      Rate and Rhythm: Normal rate and regular rhythm.   Pulmonary:      Effort: Pulmonary effort is normal.      Breath sounds: Normal breath sounds.   Abdominal:      General: There is no distension.      Tenderness: There is no abdominal tenderness. There is no guarding or rebound.   Musculoskeletal:      Cervical back: No tenderness.      Right lower leg: No edema.      Left lower leg: No edema.   Skin:     General: Skin is warm and dry.   Neurological:      General: No focal deficit present.      Mental Status: He is alert and oriented to person, place, and time.      Cranial Nerves: No cranial nerve deficit.         Fluids    Intake/Output Summary (Last 24 hours) at 10/30/2021 0919  Last data filed at 10/30/2021 0729  Gross per 24 hour   Intake 695 ml   Output 1600 ml   Net -905 ml       Laboratory  Recent Labs     10/28/21  0430 10/29/21  0431 10/30/21  0515   WBC 8.8 7.1 7.0   RBC 4.35* 4.55* 4.95   HEMOGLOBIN 13.8* 14.3 15.5   HEMATOCRIT 39.8* 41.0* 43.5   MCV 91.5 90.1 87.9   MCH 31.7 31.4 31.3   MCHC 34.7 34.9 35.6*   RDW 40.2 38.5 37.9   PLATELETCT 178 179 217   MPV 9.9 10.1 10.4     Recent Labs     10/28/21  0430  10/29/21  0431 10/30/21  0515   SODIUM 133* 132* 135   POTASSIUM 4.4 4.2 3.8   CHLORIDE 100 97 100   CO2 24 24 23   GLUCOSE 134* 106* 106*   BUN 12 8 8   CREATININE 0.72 0.70 0.71   CALCIUM 8.3* 8.8 9.0             Recent Labs     10/29/21  0431   TRIGLYCERIDE 91       Imaging  [id number]   Final Result      1.  Small periapical abscess associated with the single remaining right mandibular molar      2.  Probable caries of bilateral maxillary molar      DX-CHEST-PORTABLE (1 VIEW)   Final Result         1.  Pulmonary edema and/or infiltrates are identified, which appear somewhat increased since the prior exam.      DX-CHEST-PORTABLE (1 VIEW)   Final Result         1.  No acute cardiopulmonary disease.      DX-ABDOMEN FOR TUBE PLACEMENT   Final Result      Enteric tube projects over the stomach.      CT-HEAD W/O   Final Result         1.  No acute intracranial abnormality.      CT-MAXILLOFACIAL WITH PLUS RECONS   Final Result         1.  Right lateral incisor periapical abscess eroding through the anterior alveolar bone   2.  Calcification the right optic nerve, appearance suggests meningioma, referral to ophthalmologist for further workup and evaluation recommended.      DX-CHEST-PORTABLE (1 VIEW)   Final Result         1.  No acute cardiopulmonary disease.           Assessment/Plan  * Acute encephalopathy- (present on admission)  Assessment & Plan  Avoid benzodiazepines and anticholinergics  Frequent orientation  Avoid early morning labs  Avoid vital signs during sleep  Ambulate if possible    Dental abscess - (present on admission)  Assessment & Plan  Augmentin x 6 weeks  For surgery today      Acute respiratory failure with hypoxia (HCC)- (present on admission)  Assessment & Plan  Intubated 10/26/2021, Extubated 10/27/2021  Incentive spirometry, RT protocol    Polysubstance overdose- (present on admission)  Assessment & Plan  UDS + for Amphetamines and Cannabinoid      Noncompliance- (present on  admission)  Assessment & Plan  Education and counseling    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 In review of yesterday's note (10/29/2021), there are no changes except as documented above.

## 2021-10-30 NOTE — OR NURSING
Pt AA/Ox4. VSS. Pt denies pain at this time. Pt denies nausea or vomiting. S/P Teeth extraction. Local anesthesia given by surgeon. SCDs in place.    Report given to NELY Hunt at [address].    Left voice message update to Pt's Ashley ONEILL.    Oxygen tank was over 3/4 full when Pt left PACU.    Pt via bed, accompanied by CNA and RN, was transferred to [facility] at 1443.

## 2021-10-30 NOTE — PROGRESS NOTES
Report received from NELY Morales. Pt arrived to room 405.  Assessment complete.  A&O x4. Patient calls appropriately.  Patient ambulates with stand by assist.  Patient has 0/10 pain.  Declined intervention at this time.  Skin per flow sheet.  Tolerating regular diet. Denies N/V. Pt to be NPO at midnight for oral surgery tomorrow 10/30.  + void, + flatus, + BM. Last BM 10/29.  Review plan with of care with patient. Call light and personal belongings within reach. Hourly rounding in place. All needs met at this time.

## 2021-10-30 NOTE — OP REPORT
DATE OF SERVICE:  10/30/2021     PREOPERATIVE DIAGNOSIS:  Decayed painful and chronically infected teeth #2, 7,   13, 15, 16 and 31.     POSTOPERATIVE DIAGNOSIS:  Decayed painful and chronically infected teeth #2,   7, 13, 15, 16 and 31.     PROCEDURE:  Extraction of teeth #2, 7, 13, 15, 16 and 31.     SURGEON:  Demetri Perez MD, DDS     ASSISTANT SURGEON:  None.     ANESTHESIA:  General anesthesia with oroendotracheal intubation.     ANESTHESIOLOGIST:  Nazario Irizarry MD      INDICATIONS:  This is a 36-year-old man with a history of polysubstance abuse   and dental neglect, who has developed a dental decay in multiple teeth   including 2, 7, 13, 15, 16 and 31, which are the teeth with the most severe   decay.  He was admitted to the [facility] Emergency   Department complaining of dental pain and was also found to have altered   mental status.  He was intubated and sedated and admitted to the ICU for   observation.  He was quickly extubated within 48 hours and an oral surgery   consultation was called.  The patient was seen in the ICU and was found to   have multiple decayed teeth and acute pain in the upper right, upper left, and   lower right quadrants.  A panoramic mandibular x-ray was obtained, which   showed multiple decayed teeth.  A plan was established to extract any and all   decayed unrestorable teeth.  The patient actually requested to have all of his   teeth removed.  The procedure, risks, benefits and alternatives were   described and discussed in detail.  Any abscess is noted, would be dealt   intraoperatively with incision and drainage as necessary.  All questions were   answered and formal written consent was obtained to proceed as planned.  The   patient was scheduled for the operating room on the afternoon of 10/30/2021.     DESCRIPTION OF PROCEDURE:  [place]   [facility]ine position.     General anesthesia with oral endotracheal intubation was performed without   complication by Dr. Irizarry. Preoperative antibiotics had been infused.  The   patient was positioned, prepped and draped in the usual fashion for a dental   extraction and incision and drainage procedure.  Approximately 14 mL of 1%   lidocaine with 1:100,000 dilution of epinephrine was administered via regional   oral blocks and local infiltration.  The oropharynx was suctioned and a   throat pack was placed.  A bite block and retractors were placed.  Attention   was turned to the right upper quadrant where teeth #2 and 7 were extracted   with periodontal separation, elevator luxation and forceps or rongeur removal.    Sockets were curetted thoroughly and irrigated with saline.  Loose gingiva   was sutured with interrupted 3-0 chromic gut.  Attention was turned to the   left upper quadrant where teeth #13, 15 and 16 were extracted in the usual   fashion with periodontal separation, elevator luxation and removal of tooth   #13 and elevator removal of the decayed roots of teeth #15 and 16.  The   sockets were curetted thoroughly, the bone was smoothed with a rasp in the 15   and 16 area and the soft tissues were sutured with interrupted 3-0 chromic   gut.  Finally, attention was turned to the left lower quadrant where teeth #18   and 19 were stable and with minor buccal cervical decay.  There was no need   for extraction of these teeth at this time.  Attention was finally turned to   the right lower quadrant where tooth #31 was noted to have advanced decay.  A   mesial gingival incision was made and the periodontium was .  A   forceps luxation was performed with crown removal and root separation.  Tooth   was removed in fragments with a forceps and a rongeur.  The socket was   curetted thoroughly irrigated with saline.  The gingiva was sutured with a 3-0   chromic gut.  The throat pack was removed and the oropharynx was suctioned.     The patient was undraped and cleansed.  He was turned over to anesthesia for   emergence and extubation.  This was performed without complication by Dr. Irizarry.  The patient was transferred to the PACU awake and in stable   condition.     ESTIMATED BLOOD LOSS:  Minimal.     SPECIMENS:  None (teeth discarded).     DRAINS:  None.     COMPLICATIONS:  None.     DISPOSITION:  After recovery in the PACU, the patient will be readmitted to   the hospitalist service for postoperative pain control, IV antibiotics and   observation.  His discharge disposition is pending.        ______________________________  PREETI AGGARWAL MD;NATOS    KODI/LAMBERT    DD:  10/30/2021 13:53  DT:  10/30/2021 14:22    Job#:  [id number]

## 2021-10-30 NOTE — PROGRESS NOTES
Oral Surgery Preop    HD #5 s/p admssion.  Service:  Medicine    ID:  36 year-old male with a history of dental neglect and a several day history of molar region pain and upper anterior pain and swellling.  Dx:  Dental caries and cellulities.  Plan:  Extraction of decayed unrestorable teeth and incision and drainage of any abscess.  Anesthesia:  GA    Consent:  Discussed injury, necessary surgery, P/R/B/A.  Risks include infection, bleeding, nerve injury, scarring, need for further surgery or other treatment.  Questions answered.  Verbal consent given.  Written consent form signed.    Scheduled:  10-30-21 at 12:30 pm.  Location:  [facility] OR    Demetri Perez MD, DDS

## 2021-10-30 NOTE — ANESTHESIA TIME REPORT
Anesthesia Start and Stop Event Times     Date Time Event    10/30/2021 1222 Ready for Procedure     1247 Anesthesia Start     1347 Anesthesia Stop        Responsible Staff  10/30/21    Name Role Begin End    Nazario Irizarry M.D. Anesth 1247 1347        Preop Diagnosis (Free Text):  Pre-op Diagnosis     LUGWIG ANGINA         Preop Diagnosis (Codes):    Premium Reason  B. 1st Call    Comments: PREMIUM ROMEL

## 2021-10-30 NOTE — OR SURGEON
Immediate Post OP Note    PreOp Diagnosis:  Decayed and chronically infected teeth numbers 2, 7, 13, 15, 16, and 31.    PostOp Diagnosis: Decayed and chronically infected teeth numbers 2, 7, 13, 15, 16, and 31.    Procedure(s):   Extraction of teeth numbers 2, 7, 13, 15, 16, and 31.    Surgeon(s):  Demetri Perez M.D.    Anesthesiologist/Type of Anesthesia:  Anesthesiologist: Nazario Irizarry M.D./General    Surgical Staff:  Circulator: Milla Storm R.N.  Scrub Person: Elizabeth Booker    Specimens removed if any:  Teeth discarded.    Estimated Blood Loss: Minimal.    Findings: Advanced decay with apical osteolysis.    Complications: None.        10/30/2021 1:41 PM Demetri Perez M.D.

## 2021-10-30 NOTE — ANESTHESIA PROCEDURE NOTES
Airway    Date/Time: 10/30/2021 12:50 PM  Performed by: Nazario Irizarry M.D.  Authorized by: Nazario Irizarry M.D.     Location:  OR  Urgency:  Elective  Indications for Airway Management:  Anesthesia      Spontaneous Ventilation: absent    Sedation Level:  Deep  Preoxygenated: Yes    Patient Position:  Sniffing  Final Airway Type:  Endotracheal airway  Final Endotracheal Airway:  ETT  Cuffed: Yes    Technique Used for Successful ETT Placement:  Direct laryngoscopy    Insertion Site:  Oral  Blade Type:  [place]  Laryngoscope Blade/Videolaryngoscope Blade Size:  4  ETT Size (mm):  7.0  Measured from:  Teeth  ETT to Teeth (cm):  24  Placement Verified by: auscultation and capnometry    Cormack-Lehane Classification:  Grade I - full view of glottis  Number of Attempts at Approach:  1

## 2021-10-30 NOTE — PROGRESS NOTES
Assumed care of PT A&O 4. Pt resting in bed with no signs of labored breathing. On RA. Pt is medical. Call light within reach, bed in lowest position, upper bed rails up. Pt was updated on plan of care for the day . Will continue to monitor.

## 2021-10-30 NOTE — PROGRESS NOTES
4 Eyes Skin Assessment Completed by NELY Islas and NELY Kendall.    Head WDL  Ears WDL  Nose WDL  Mouth WDL  Neck WDL  Breast/Chest WDL  Shoulder Blades WDL  Spine WDL  (R) Arm/Elbow/Hand scattered healing abrasions  (L) Arm/Elbow/Hand scattered healing abrasions  Abdomen WDL  Groin WDL  Scrotum/Coccyx/Buttocks WDL  (R) Leg WDL  (L) Leg WDL  (R) Heel/Foot/Toe dry/ cracking heels and toes  (L) Heel/Foot/Toe dry/cracking heels and toes          Devices In Places - PIV      Interventions In Place Pillows and Pressure Redistribution Mattress    Possible Skin Injury No    Pictures Uploaded Into Epic N/A  Wound Consult Placed N/A  RN Wound Prevention Protocol Ordered No

## 2021-10-30 NOTE — ANESTHESIA PREPROCEDURE EVALUATION
Relevant Problems   CARDIAC   (positive) Pulmonary embolism, other, unspecified chronicity, unspecified whether acute cor pulmonale present (HCC)       Physical Exam    Airway   Mallampati: II  TM distance: >3 FB  Neck ROM: full       Cardiovascular - normal exam  Rhythm: regular  Rate: normal  (-) murmur     Dental - normal exam           Pulmonary - normal exam  Breath sounds clear to auscultation     Abdominal    Neurological - normal exam                 Anesthesia Plan    ASA 2- EMERGENT   ASA physical status emergent criteria: acute ischemia (limb, body part, tissue) and acute deteriorating condition due to infection    Plan - general       Airway plan will be ETT          Induction: intravenous    Postoperative Plan: Postoperative administration of opioids is intended.    Pertinent diagnostic labs and testing reviewed    Informed Consent:    Anesthetic plan and risks discussed with patient.    Use of blood products discussed with: patient whom consented to blood products.

## 2021-10-30 NOTE — ANESTHESIA POSTPROCEDURE EVALUATION
Patient: Sagrario Berkowitz    Procedure Summary     Date: 10/30/21 Room / Location: [place] [address] / SURGERY [facility]    Anesthesia Start: 1247 Anesthesia Stop:     Procedure: EXTRACTION, TOOTH, # 1,7,13,15,16,31  (Mouth) Diagnosis: (DENTAL DECAY AND CHRONIC DENTAL INFECTION )    Surgeons: Demetri Perez M.D. Responsible Provider: Nazario Irizarry M.D.    Anesthesia Type: general ASA Status: 2 - Emergent          Final Anesthesia Type: general  Last vitals  BP   Blood Pressure: 117/68    Temp   37.3 °C (99.2 °F)    Pulse   83   Resp   17    SpO2   99 %      Anesthesia Post Evaluation    Patient location during evaluation: PACU  Patient participation: complete - patient participated  Level of consciousness: awake and alert  Pain score: 0    Airway patency: patent  Anesthetic complications: no  Cardiovascular status: hemodynamically stable  Respiratory status: acceptable  Hydration status: euvolemic    PONV: none          No complications documented.     Nurse Pain Score: 10 (NPRS)

## 2021-10-31 VITALS
HEIGHT: 74 IN | HEART RATE: 87 BPM | RESPIRATION RATE: 18 BRPM | SYSTOLIC BLOOD PRESSURE: 111 MMHG | TEMPERATURE: 98 F | DIASTOLIC BLOOD PRESSURE: 69 MMHG | OXYGEN SATURATION: 92 % | WEIGHT: 218.26 LBS | BODY MASS INDEX: 28.01 KG/M2

## 2021-10-31 LAB
ANION GAP SERPL CALC-SCNC: 10 MMOL/L (ref 7–16)
BACTERIA BLD CULT: NORMAL
BACTERIA BLD CULT: NORMAL
BASOPHILS # BLD AUTO: 0.1 % (ref 0–1.8)
BASOPHILS # BLD: 0.01 K/UL (ref 0–0.12)
BUN SERPL-MCNC: 7 MG/DL (ref 8–22)
CALCIUM SERPL-MCNC: 9.1 MG/DL (ref 8.5–10.5)
CHLORIDE SERPL-SCNC: 101 MMOL/L (ref 96–112)
CO2 SERPL-SCNC: 24 MMOL/L (ref 20–33)
CREAT SERPL-MCNC: 0.66 MG/DL (ref 0.5–1.4)
EOSINOPHIL # BLD AUTO: 0.01 K/UL (ref 0–0.51)
EOSINOPHIL NFR BLD: 0.1 % (ref 0–6.9)
ERYTHROCYTE [DISTWIDTH] IN BLOOD BY AUTOMATED COUNT: 38.2 FL (ref 35.9–50)
EST. AVERAGE GLUCOSE BLD GHB EST-MCNC: 117 MG/DL
GLUCOSE SERPL-MCNC: 152 MG/DL (ref 65–99)
HBA1C MFR BLD: 5.7 % (ref 4–5.6)
HCT VFR BLD AUTO: 43 % (ref 42–52)
HGB BLD-MCNC: 15.1 G/DL (ref 14–18)
IMM GRANULOCYTES # BLD AUTO: 0.02 K/UL (ref 0–0.11)
IMM GRANULOCYTES NFR BLD AUTO: 0.3 % (ref 0–0.9)
LYMPHOCYTES # BLD AUTO: 1.45 K/UL (ref 1–4.8)
LYMPHOCYTES NFR BLD: 19.9 % (ref 22–41)
MCH RBC QN AUTO: 31.5 PG (ref 27–33)
MCHC RBC AUTO-ENTMCNC: 35.1 G/DL (ref 33.7–35.3)
MCV RBC AUTO: 89.6 FL (ref 81.4–97.8)
MONOCYTES # BLD AUTO: 0.75 K/UL (ref 0–0.85)
MONOCYTES NFR BLD AUTO: 10.3 % (ref 0–13.4)
NEUTROPHILS # BLD AUTO: 5.04 K/UL (ref 1.82–7.42)
NEUTROPHILS NFR BLD: 69.3 % (ref 44–72)
NRBC # BLD AUTO: 0 K/UL
NRBC BLD-RTO: 0 /100 WBC
PLATELET # BLD AUTO: 211 K/UL (ref 164–446)
PMV BLD AUTO: 10.7 FL (ref 9–12.9)
POTASSIUM SERPL-SCNC: 4 MMOL/L (ref 3.6–5.5)
RBC # BLD AUTO: 4.8 M/UL (ref 4.7–6.1)
SIGNIFICANT IND 70042: NORMAL
SIGNIFICANT IND 70042: NORMAL
SITE SITE: NORMAL
SITE SITE: NORMAL
SODIUM SERPL-SCNC: 135 MMOL/L (ref 135–145)
SOURCE SOURCE: NORMAL
SOURCE SOURCE: NORMAL
TSH SERPL DL<=0.005 MIU/L-ACNC: 0.73 UIU/ML (ref 0.38–5.33)
WBC # BLD AUTO: 7.3 K/UL (ref 4.8–10.8)

## 2021-10-31 PROCEDURE — 85025 COMPLETE CBC W/AUTO DIFF WBC: CPT

## 2021-10-31 PROCEDURE — 700102 HCHG RX REV CODE 250 W/ 637 OVERRIDE(OP): Performed by: INTERNAL MEDICINE

## 2021-10-31 PROCEDURE — A9270 NON-COVERED ITEM OR SERVICE: HCPCS | Performed by: NURSE PRACTITIONER

## 2021-10-31 PROCEDURE — 36415 COLL VENOUS BLD VENIPUNCTURE: CPT

## 2021-10-31 PROCEDURE — 700105 HCHG RX REV CODE 258: Performed by: FAMILY MEDICINE

## 2021-10-31 PROCEDURE — 80048 BASIC METABOLIC PNL TOTAL CA: CPT

## 2021-10-31 PROCEDURE — A9270 NON-COVERED ITEM OR SERVICE: HCPCS | Performed by: INTERNAL MEDICINE

## 2021-10-31 PROCEDURE — 99233 SBSQ HOSP IP/OBS HIGH 50: CPT | Performed by: FAMILY MEDICINE

## 2021-10-31 PROCEDURE — 700111 HCHG RX REV CODE 636 W/ 250 OVERRIDE (IP): Performed by: INTERNAL MEDICINE

## 2021-10-31 PROCEDURE — 84443 ASSAY THYROID STIM HORMONE: CPT

## 2021-10-31 PROCEDURE — 700102 HCHG RX REV CODE 250 W/ 637 OVERRIDE(OP): Performed by: NURSE PRACTITIONER

## 2021-10-31 PROCEDURE — 83036 HEMOGLOBIN GLYCOSYLATED A1C: CPT

## 2021-10-31 RX ORDER — AMOXICILLIN AND CLAVULANATE POTASSIUM 875; 125 MG/1; MG/1
1 TABLET, FILM COATED ORAL EVERY 12 HOURS
Qty: 28 TABLET | Refills: 0 | Status: SHIPPED | OUTPATIENT
Start: 2021-10-31 | End: 2021-11-14

## 2021-10-31 RX ADMIN — THERA TABS 1 TABLET: TAB at 05:56

## 2021-10-31 RX ADMIN — LAMIVUDINE 300 MG: 150 TABLET, FILM COATED ORAL at 05:57

## 2021-10-31 RX ADMIN — Medication 100 MG: at 05:56

## 2021-10-31 RX ADMIN — DEXTROSE AND SODIUM CHLORIDE: 5; 900 INJECTION, SOLUTION INTRAVENOUS at 01:54

## 2021-10-31 RX ADMIN — LORAZEPAM 1 MG: 2 INJECTION INTRAMUSCULAR; INTRAVENOUS at 05:59

## 2021-10-31 RX ADMIN — SENNOSIDES, DOCUSATE SODIUM 2 TABLET: 8.6; 5 TABLET ORAL at 05:56

## 2021-10-31 RX ADMIN — DOLUTEGRAVIR SODIUM 50 MG: 50 TABLET, FILM COATED ORAL at 05:57

## 2021-10-31 RX ADMIN — OXYCODONE 5 MG: 5 TABLET ORAL at 05:59

## 2021-10-31 RX ADMIN — AMOXICILLIN AND CLAVULANATE POTASSIUM 1 TABLET: 875; 125 TABLET, FILM COATED ORAL at 05:56

## 2021-10-31 RX ADMIN — ABACAVIR 600 MG: 300 TABLET, FILM COATED ORAL at 05:57

## 2021-10-31 ASSESSMENT — ENCOUNTER SYMPTOMS
COUGH: 0
SPEECH CHANGE: 0
HEARTBURN: 0
WEAKNESS: 0
DIAPHORESIS: 0
PALPITATIONS: 0
MYALGIAS: 0
FEVER: 0
SENSORY CHANGE: 0
SORE THROAT: 0
BLURRED VISION: 0
NERVOUS/ANXIOUS: 0
HEADACHES: 0
NECK PAIN: 0
WHEEZING: 0
FOCAL WEAKNESS: 0
VOMITING: 0
ABDOMINAL PAIN: 0
BACK PAIN: 0
DIARRHEA: 0
DIZZINESS: 0
NAUSEA: 0
FLANK PAIN: 0
CHILLS: 0
SHORTNESS OF BREATH: 0

## 2021-10-31 ASSESSMENT — PAIN DESCRIPTION - PAIN TYPE: TYPE: ACUTE PAIN;SURGICAL PAIN

## 2021-10-31 NOTE — PROGRESS NOTES
[facility] Medicine Daily Progress Note    Date of Service  10/31/2021    Chief Complaint  Sagrario Berkowitz is a 36 y.o. male admitted 10/26/2021 with acute encephalopathy.    Hospital Course  Admitted with acute encephalopathy, he was intubated for airway protection.  He initially required admission to the intensive care unit.  Prior to the admission, he was complaining of dental infection.  CT maxillofacial showed Right lateral incisor periapical abscess eroding through the anterior alveolar bone.  He was started on empiric coverage with IV Unasyn.  He was extubated on 10/27/2021.  Lumbar puncture was done which was noted to be negative.  He was negative for RPR but positive for treponemal antibody.  He has known history of HIV, with noncompliance with medications and follow-up.  Infectious disease was consulted on the case.  He was started on HIV medications.  Oral maxillofacial surgery was consulted on the case.    Interval Problem Update  Dental abscess -underwent surgery yesterday, pain controlled, tolerating full liquids  Hypoglycemia -   Respiratory failure - off O2    Updates given and plan of care discussed with patient's spouse who was at bedside.  Discussed probable discharge today once clearance from OMF surgery given.  Also discussed need for compliance with follow-up at the [facility].    I have personally seen and examined the patient at bedside. I discussed the plan of care with patient, family and bedside RN.    Consultants/Specialty  critical care, infectious disease and OMF surgery    Code Status  Full Code    Disposition  Patient is not medically cleared.   Anticipate discharge to to home with close outpatient follow-up.  I have placed the appropriate orders for post-discharge needs.    Review of Systems  Review of Systems   Constitutional: Negative for chills, diaphoresis, fever and malaise/fatigue.   HENT: Negative for congestion, hearing loss and sore throat.    Eyes: Negative for blurred  vision.   Respiratory: Negative for cough, shortness of breath and wheezing.    Cardiovascular: Negative for chest pain, palpitations and leg swelling.   Gastrointestinal: Negative for abdominal pain, diarrhea, heartburn, nausea and vomiting.   Genitourinary: Negative for dysuria, flank pain and hematuria.   Musculoskeletal: Negative for back pain, joint pain, myalgias and neck pain.   Skin: Negative for rash.   Neurological: Negative for dizziness, sensory change, speech change, focal weakness, weakness and headaches.   Psychiatric/Behavioral: The patient is not nervous/anxious.         Physical Exam  Temp:  [36.1 °C (97 °F)-37.3 °C (99.2 °F)] 36.7 °C (98 °F)  Pulse:  [] 87  Resp:  [17-20] 18  BP: (109-149)/(66-93) 111/69  SpO2:  [91 %-100 %] 92 %    Physical Exam  Vitals and nursing note reviewed.   HENT:      Head: Normocephalic and atraumatic.      Nose: No congestion.      Mouth/Throat:      Mouth: Mucous membranes are moist.   Eyes:      Extraocular Movements: Extraocular movements intact.      Conjunctiva/sclera: Conjunctivae normal.   Cardiovascular:      Rate and Rhythm: Normal rate and regular rhythm.   Pulmonary:      Effort: Pulmonary effort is normal.      Breath sounds: Normal breath sounds.   Abdominal:      General: There is no distension.      Tenderness: There is no abdominal tenderness. There is no guarding or rebound.   Musculoskeletal:      Cervical back: No tenderness.      Right lower leg: No edema.      Left lower leg: No edema.   Skin:     General: Skin is warm and dry.   Neurological:      General: No focal deficit present.      Mental Status: He is alert and oriented to person, place, and time.      Cranial Nerves: No cranial nerve deficit.         Fluids    Intake/Output Summary (Last 24 hours) at 10/31/2021 1142  Last data filed at 10/31/2021 0800  Gross per 24 hour   Intake 1610 ml   Output 1590 ml   Net 20 ml       Laboratory  Recent Labs     10/29/21  0431 10/30/21  3050  10/31/21  0400   WBC 7.1 7.0 7.3   RBC 4.55* 4.95 4.80   HEMOGLOBIN 14.3 15.5 15.1   HEMATOCRIT 41.0* 43.5 43.0   MCV 90.1 87.9 89.6   MCH 31.4 31.3 31.5   MCHC 34.9 35.6* 35.1   RDW 38.5 37.9 38.2   PLATELETCT 179 217 211   MPV 10.1 10.4 10.7     Recent Labs     10/29/21  0431 10/30/21  0515 10/31/21  0400   SODIUM 132* 135 135   POTASSIUM 4.2 3.8 4.0   CHLORIDE 97 100 101   CO2 24 23 24   GLUCOSE 106* 106* 152*   BUN 8 8 7*   CREATININE 0.70 0.71 0.66   CALCIUM 8.8 9.0 9.1             Recent Labs     10/29/21  0431   TRIGLYCERIDE 91       Imaging  [id number]   Final Result      1.  Small periapical abscess associated with the single remaining right mandibular molar      2.  Probable caries of bilateral maxillary molar      DX-CHEST-PORTABLE (1 VIEW)   Final Result         1.  Pulmonary edema and/or infiltrates are identified, which appear somewhat increased since the prior exam.      DX-CHEST-PORTABLE (1 VIEW)   Final Result         1.  No acute cardiopulmonary disease.      DX-ABDOMEN FOR TUBE PLACEMENT   Final Result      Enteric tube projects over the stomach.      CT-HEAD W/O   Final Result         1.  No acute intracranial abnormality.      CT-MAXILLOFACIAL WITH PLUS RECONS   Final Result         1.  Right lateral incisor periapical abscess eroding through the anterior alveolar bone   2.  Calcification the right optic nerve, appearance suggests meningioma, referral to ophthalmologist for further workup and evaluation recommended.      DX-CHEST-PORTABLE (1 VIEW)   Final Result         1.  No acute cardiopulmonary disease.           Assessment/Plan  * Acute encephalopathy- (present on admission)  Assessment & Plan  Avoid benzodiazepines and anticholinergics  Frequent orientation  Avoid early morning labs  Avoid vital signs during sleep  Ambulate if possible    Dental abscess - (present on admission)  Assessment & Plan  Extraction of teeth # 2, 7, 13, 15, 16 and 31. 10/30/2021  Augmentin x 6  weeks  Pain control      Acute respiratory failure with hypoxia (HCC)- (present on admission)  Assessment & Plan  Intubated 10/26/2021, Extubated 10/27/2021  Incentive spirometry, RT protocol    Polysubstance overdose- (present on admission)  Assessment & Plan  UDS + for Amphetamines and Cannabinoid      Noncompliance- (present on admission)  Assessment & Plan  Education and counseling    Serum positive for Treponema pallidum by PCR- (present on admission)  Assessment & Plan  Follow up with ID    Hypoglycemia- (present on admission)  Assessment & Plan  IVF D5    Meningioma (HCC)- (present on admission)  Assessment & Plan  Follow up with Neuro-ophthalmology    Symptomatic HIV infection (HCC)?- (present on admission)  Assessment & Plan  Ziagen, Tivicay, Epivir       VTE prophylaxis: enoxaparin ppx    I have performed a physical exam and reviewed and updated ROS and Plan today (10/31/2021). In review of yesterday's note (10/30/2021), there are no changes except as documented above.

## 2021-10-31 NOTE — PROGRESS NOTES
Patient left against medical advice. Followed patient to elevator. Verified the IV was out of his hand, educated patient on medication. Patient still insistent on leaving. Dr. Morales notified.

## 2021-10-31 NOTE — PROGRESS NOTES
Bedside report received.  Assessment complete.  A&O x4. Patient calls appropriately.  Patient ambulates with no assist. Bed alarm off.   Patient has 8/10 pain. Medicated per MAR.   Skin per flow sheet.  Tolerating full liquid diet. Denies N/V.  + void, + flatus, + BM. Last BM 10/29.  SCD's off.  Review plan with of care with patient. Call light and personal belongings within reach. Hourly rounding in place. All needs met at this time.

## 2021-10-31 NOTE — DISCHARGE SUMMARY
"Discharge Summary    CHIEF COMPLAINT ON ADMISSION  Chief Complaint   Patient presents with   • Dental Pain     \"hole in one of his back molars\", started last night. the pain got progressively worse throughout the day   • Altered Mental Status     pt unable to answer questions appropriately, hunched over and rocking back and forth. girlfriend answering most questions.       Reason for Admission  dental pain     Admission Date  10/26/2021    CODE STATUS  Full Code    HPI & HOSPITAL COURSE  This is a 36 y.o. male here with acute encephalopathy, he was intubated for airway protection.  He initially required admission to the intensive care unit.  Prior to the admission, he was complaining of dental infection.  CT maxillofacial showed Right lateral incisor periapical abscess eroding through the anterior alveolar bone.  He was started on empiric coverage with IV Unasyn.  He was extubated on 10/27/2021.  Lumbar puncture was done which was noted to be negative.  He was negative for RPR but positive for treponemal antibody.  He has known history of HIV, with noncompliance with medications and follow-up.  Infectious disease was consulted on the case.  He was started on HIV medications.  Oral maxillofacial surgery was consulted on the case.  Patient underwent  Extraction of teeth #2, 7, 13, 15, 16 and 31.  He was continued on the Augmentin.  He appeared to be tolerating full liquids.  Plan of care and discharge plan was discussed with the patient's spouse who was at bedside. Education and counseling was provided on compliance.  Apparently patient left AGAINST MEDICAL ADVICE.  The Augmentin prescription was sent to his pharmacy.    Therefore, he is discharged in fair and stable condition against medcial advice.    The patient met 2-midnight criteria for an inpatient stay at the time of discharge.    Discharge Date  10/31/2021    FOLLOW UP ITEMS POST DISCHARGE  Follow-up with ID    DISCHARGE DIAGNOSES  Principal Problem:    Acute " encephalopathy POA: Yes  Active Problems:    Polysubstance overdose POA: Yes    Acute respiratory failure with hypoxia (HCC) POA: Yes    Dental abscess  POA: Yes    Symptomatic HIV infection (HCC)? POA: Yes    Meningioma (HCC) POA: Yes    Hypoglycemia POA: Yes    Serum positive for Treponema pallidum by PCR POA: Yes    Noncompliance POA: Yes  Resolved Problems:    Thrombocytopenia (HCC) POA: Unknown      FOLLOW UP  No future appointments.  [facility]  [address] [id number] [phone]  Go to  to establish a primary care physician    [address] [id number] [phone]  Go to  to establish a primary care physician      MEDICATIONS ON DISCHARGE     Medication List      START taking these medications      Instructions   amoxicillin-clavulanate 875-125 MG Tabs  Commonly known as: AUGMENTIN   Take 1 Tablet by mouth every 12 hours for 14 days.  Dose: 1 Tablet        CONTINUE taking these medications      Instructions   Epzicom 600-300 MG per tablet  Generic drug: abacavir-lamivudine   Take 1 tablet by mouth every morning     Tivicay 50 MG Tabs tablet  Generic drug: dolutegravir   Take 1 tablet by mouth every morning.            Allergies  No Active Allergies    DIET  Orders Placed This Encounter   Procedures   • Diet Order Diet: Full Liquid     Standing Status:   Standing     Number of Occurrences:   1     Order Specific Question:   Diet:     Answer:   Full Liquid [11]       ACTIVITY  As tolerated.  Weight bearing as tolerated    CONSULTATIONS  Critical care  ID - Jeovanny  [facility] surgery - Muff    PROCEDURES  Extraction of teeth #2, 7, 13, 15, 16 and 31.    LABORATORY  Lab Results   Component Value Date    SODIUM 135 10/31/2021    POTASSIUM 4.0 10/31/2021    CHLORIDE 101 10/31/2021    CO2 24 10/31/2021    GLUCOSE 152 (H) 10/31/2021    BUN 7 (L) 10/31/2021    CREATININE 0.66 10/31/2021        Lab Results   Component Value Date    WBC 7.3  10/31/2021    HEMOGLOBIN 15.1 10/31/2021    HEMATOCRIT 43.0 10/31/2021    PLATELETCT 211 10/31/2021        Total time of the discharge process exceeds 30 minutes.

## 2021-11-01 ENCOUNTER — HOSPITAL ENCOUNTER (EMERGENCY)
Facility: MEDICAL CENTER | Age: 36
End: 2021-11-02
Attending: EMERGENCY MEDICINE
Payer: MEDICAID

## 2021-11-01 DIAGNOSIS — K04.7 DENTAL INFECTION: ICD-10-CM

## 2021-11-01 DIAGNOSIS — G89.18 POST-OP PAIN: ICD-10-CM

## 2021-11-01 LAB
ALBUMIN SERPL BCP-MCNC: 4.1 G/DL (ref 3.2–4.9)
ALBUMIN/GLOB SERPL: 1 G/DL
ALP SERPL-CCNC: 69 U/L (ref 30–99)
ALT SERPL-CCNC: 11 U/L (ref 2–50)
ANION GAP SERPL CALC-SCNC: 13 MMOL/L (ref 7–16)
AST SERPL-CCNC: 23 U/L (ref 12–45)
BASOPHILS # BLD AUTO: 0.2 % (ref 0–1.8)
BASOPHILS # BLD: 0.02 K/UL (ref 0–0.12)
BILIRUB SERPL-MCNC: 0.4 MG/DL (ref 0.1–1.5)
BUN SERPL-MCNC: 7 MG/DL (ref 8–22)
CALCIUM SERPL-MCNC: 9.6 MG/DL (ref 8.5–10.5)
CHLORIDE SERPL-SCNC: 102 MMOL/L (ref 96–112)
CO2 SERPL-SCNC: 20 MMOL/L (ref 20–33)
CREAT SERPL-MCNC: 0.56 MG/DL (ref 0.5–1.4)
EOSINOPHIL # BLD AUTO: 0.06 K/UL (ref 0–0.51)
EOSINOPHIL NFR BLD: 0.7 % (ref 0–6.9)
ERYTHROCYTE [DISTWIDTH] IN BLOOD BY AUTOMATED COUNT: 37.3 FL (ref 35.9–50)
GLOBULIN SER CALC-MCNC: 4.1 G/DL (ref 1.9–3.5)
GLUCOSE SERPL-MCNC: 101 MG/DL (ref 65–99)
HCT VFR BLD AUTO: 39 % (ref 42–52)
HGB BLD-MCNC: 14 G/DL (ref 14–18)
IMM GRANULOCYTES # BLD AUTO: 0.03 K/UL (ref 0–0.11)
IMM GRANULOCYTES NFR BLD AUTO: 0.4 % (ref 0–0.9)
LYMPHOCYTES # BLD AUTO: 1.84 K/UL (ref 1–4.8)
LYMPHOCYTES NFR BLD: 22.8 % (ref 22–41)
MCH RBC QN AUTO: 31.3 PG (ref 27–33)
MCHC RBC AUTO-ENTMCNC: 35.9 G/DL (ref 33.7–35.3)
MCV RBC AUTO: 87.1 FL (ref 81.4–97.8)
MONOCYTES # BLD AUTO: 0.97 K/UL (ref 0–0.85)
MONOCYTES NFR BLD AUTO: 12 % (ref 0–13.4)
NEUTROPHILS # BLD AUTO: 5.16 K/UL (ref 1.82–7.42)
NEUTROPHILS NFR BLD: 63.9 % (ref 44–72)
NRBC # BLD AUTO: 0 K/UL
NRBC BLD-RTO: 0 /100 WBC
PLATELET # BLD AUTO: 296 K/UL (ref 164–446)
PMV BLD AUTO: 9.7 FL (ref 9–12.9)
POTASSIUM SERPL-SCNC: 4 MMOL/L (ref 3.6–5.5)
PROT SERPL-MCNC: 8.2 G/DL (ref 6–8.2)
RBC # BLD AUTO: 4.48 M/UL (ref 4.7–6.1)
SODIUM SERPL-SCNC: 135 MMOL/L (ref 135–145)
WBC # BLD AUTO: 8.1 K/UL (ref 4.8–10.8)

## 2021-11-01 PROCEDURE — 80053 COMPREHEN METABOLIC PANEL: CPT

## 2021-11-01 PROCEDURE — 36415 COLL VENOUS BLD VENIPUNCTURE: CPT

## 2021-11-01 PROCEDURE — 99284 EMERGENCY DEPT VISIT MOD MDM: CPT

## 2021-11-01 PROCEDURE — 85025 COMPLETE CBC W/AUTO DIFF WBC: CPT

## 2021-11-01 PROCEDURE — 700105 HCHG RX REV CODE 258: Performed by: EMERGENCY MEDICINE

## 2021-11-01 PROCEDURE — 96365 THER/PROPH/DIAG IV INF INIT: CPT

## 2021-11-01 PROCEDURE — 700111 HCHG RX REV CODE 636 W/ 250 OVERRIDE (IP): Performed by: EMERGENCY MEDICINE

## 2021-11-01 RX ADMIN — AMPICILLIN SODIUM AND SULBACTAM SODIUM 3 G: 2; 1 INJECTION, POWDER, FOR SOLUTION INTRAMUSCULAR; INTRAVENOUS at 23:28

## 2021-11-01 ASSESSMENT — FIBROSIS 4 INDEX: FIB4 SCORE: 1.44

## 2021-11-02 VITALS
SYSTOLIC BLOOD PRESSURE: 119 MMHG | OXYGEN SATURATION: 100 % | DIASTOLIC BLOOD PRESSURE: 71 MMHG | BODY MASS INDEX: 25.73 KG/M2 | HEART RATE: 77 BPM | TEMPERATURE: 98 F | WEIGHT: 190 LBS | RESPIRATION RATE: 18 BRPM | HEIGHT: 72 IN

## 2021-11-02 RX ORDER — AMOXICILLIN AND CLAVULANATE POTASSIUM 875; 125 MG/1; MG/1
1 TABLET, FILM COATED ORAL 2 TIMES DAILY
Qty: 14 TABLET | Refills: 0 | Status: SHIPPED | OUTPATIENT
Start: 2021-11-02 | End: 2022-08-29

## 2021-11-02 NOTE — ED TRIAGE NOTES
"Sagrario Berkowitz  36 y.o.  Chief Complaint   Patient presents with   • Malaise   • Post-Op Complications     Patient to triage for above. Pt was d/c from hospital yesterday after being intubated and undergoing multiple tooth extractions. Left AMA prior to receiving discharge paperwork and Rx. States has not taken prescribed abx. Pt states \"I feel the sepsis in me\", \"I feel the infection in me\". Pt vague with symptoms.     Pt appears disheveled, arrives to triage room without shoes.    Triage process explained to patient, apologized for wait time, and returned to lobby.  Pt informed to notify staff of any change in condition. VSS. NAD at this time.  "

## 2021-11-02 NOTE — ED PROVIDER NOTES
"ED Provider Note    Scribed for Dr. Dell Billingsley M.D. by Jori Maciel. 11/1/2021  10:50 PM    Primary care provider: Patient states none  Means of arrival: Walk in   History obtained from: patient   History limited by: None     CHIEF COMPLAINT  Chief Complaint   Patient presents with    Malaise    Post-Op Complications       HPI  Sagrario Berkowitz is a 36 y.o. male who presents to the Emergency Department for evaluation of post op complications onset yesterday. Patient reports he was discharged from the hospital yesterday after multiple tooth extractions. Patient reports he had 2 dental abscesses that were treated yesterday. Patient left AMA after procedure without discharge notes and prescribed antibiotics. Patient has not taken his antibiotics that was prescribed to him yesterday. He reports that today he \"feels horrible\" and feels as though theres an infection in his body. Presents associated symptoms of mouth pain, weakness and subjective fever. Patient denies associated symptoms of difficulty breathing, chills, or other symptoms at this time. No alleviating factors noted at this time. History of Pulmonary Embolism          REVIEW OF SYSTEMS  Pertinent positives include mouth pain, weakness and subjective fever. Pertinent negatives include no shortness of breath or chills. As above, all other systems reviewed and are negative.   See HPI for further details.     PAST MEDICAL HISTORY   has a past medical history of Infectious disease and Pulmonary embolism (HCC).    SURGICAL HISTORY   has a past surgical history that includes dental surgery procedure (10/30/2021).    SOCIAL HISTORY  Social History     Tobacco Use    Smoking status: Current Every Day Smoker     Packs/day: 0.50     Types: Cigarettes    Smokeless tobacco: Former User    Tobacco comment: ppd   Vaping Use    Vaping Use: Former   Substance Use Topics    Alcohol use: Yes     Comment: Occasionally; \"2-3x/month maybe\"    Drug use: Not Currently     Types: " Inhaled     Comment: previously smoked [email or web address]      Social History     Substance and Sexual Activity   Drug Use Not Currently    Types: Inhaled    Comment: previously smoked marijauna       FAMILY HISTORY  Family History   Problem Relation Age of Onset    Thyroid Mother     Hypertension Mother     Hypertension Father        CURRENT MEDICATIONS  Current Outpatient Medications   Medication Instructions    abacavir-lamivudine (EPZICOM) 600-300 MG per tablet Take 1 tablet by mouth every morning    amoxicillin-clavulanate (AUGMENTIN) 875-125 MG Tab 1 Tablet, Oral, EVERY 12 HOURS    dolutegravir (TIVICAY) 50 MG Tab tablet Take 1 tablet by mouth every morning.        ALLERGIES  No Active Allergies    PHYSICAL EXAM  VITAL SIGNS: /89   Pulse 78   Temp 36.7 °C (98 °F) (Temporal)   Resp 20   Ht 1.829 m (6')   Wt 86.2 kg (190 lb)   SpO2 97%   BMI 25.77 kg/m²     Constitutional: Well developed, Well nourished, moderate distress, Non-toxic appearance.   HENT: Normocephalic, Atraumatic, Bilateral external ears normal, Oropharynx moist, Right and left upper molar extractions that appear normal with no infection, drainage, or swelling.    Eyes: PERRLA, EOMI, Conjunctiva normal, No discharge.   Neck: No tenderness, Supple, No stridor.   Lymphatic: No lymphadenopathy noted.   Cardiovascular: Normal heart rate, Normal rhythm.   Thorax & Lungs: Clear to auscultation bilaterally, No respiratory distress, No wheezing, No crackles.   Abdomen: Soft, No tenderness, No masses, No pulsatile masses.   Skin: Warm, Dry, No erythema, No rash.   Extremities:, No edema No cyanosis.   Musculoskeletal: No tenderness to palpation or major deformities noted.  Intact distal pulses  Neurologic: Awake, alert. Moves all extremities spontaneously.  Psychiatric: Affect normal, Judgment normal, Mood normal.     LABS  Results for orders placed or performed during the hospital encounter of 11/01/21   CBC WITH DIFFERENTIAL   Result Value Ref Range     WBC 8.1 4.8 - 10.8 K/uL    RBC 4.48 (L) 4.70 - 6.10 M/uL    Hemoglobin 14.0 14.0 - 18.0 g/dL    Hematocrit 39.0 (L) 42.0 - 52.0 %    MCV 87.1 81.4 - 97.8 fL    MCH 31.3 27.0 - 33.0 pg    MCHC 35.9 (H) 33.7 - 35.3 g/dL    RDW 37.3 35.9 - 50.0 fL    Platelet Count 296 164 - 446 K/uL    MPV 9.7 9.0 - 12.9 fL    Neutrophils-Polys 63.90 44.00 - 72.00 %    Lymphocytes 22.80 22.00 - 41.00 %    Monocytes 12.00 0.00 - 13.40 %    Eosinophils 0.70 0.00 - 6.90 %    Basophils 0.20 0.00 - 1.80 %    Immature Granulocytes 0.40 0.00 - 0.90 %    Nucleated RBC 0.00 /100 WBC    Neutrophils (Absolute) 5.16 1.82 - 7.42 K/uL    Lymphs (Absolute) 1.84 1.00 - 4.80 K/uL    Monos (Absolute) 0.97 (H) 0.00 - 0.85 K/uL    Eos (Absolute) 0.06 0.00 - 0.51 K/uL    Baso (Absolute) 0.02 0.00 - 0.12 K/uL    Immature Granulocytes (abs) 0.03 0.00 - 0.11 K/uL    NRBC (Absolute) 0.00 K/uL   COMP METABOLIC PANEL   Result Value Ref Range    Sodium 135 135 - 145 mmol/L    Potassium 4.0 3.6 - 5.5 mmol/L    Chloride 102 96 - 112 mmol/L    Co2 20 20 - 33 mmol/L    Anion Gap 13.0 7.0 - 16.0    Glucose 101 (H) 65 - 99 mg/dL    Bun 7 (L) 8 - 22 mg/dL    Creatinine 0.56 0.50 - 1.40 mg/dL    Calcium 9.6 8.5 - 10.5 mg/dL    AST(SGOT) 23 12 - 45 U/L    ALT(SGPT) 11 2 - 50 U/L    Alkaline Phosphatase 69 30 - 99 U/L    Total Bilirubin 0.4 0.1 - 1.5 mg/dL    Albumin 4.1 3.2 - 4.9 g/dL    Total Protein 8.2 6.0 - 8.2 g/dL    Globulin 4.1 (H) 1.9 - 3.5 g/dL    A-G Ratio 1.0 g/dL   ESTIMATED GFR   Result Value Ref Range    GFR If African American >60 >60 mL/min/1.73 m 2    GFR If Non African American >60 >60 mL/min/1.73 m 2      All labs reviewed by me.    COURSE & MEDICAL DECISION MAKING  Pertinent Labs & Imaging studies reviewed. (See chart for details)    10:50 PM - Patient seen and examined at bedside. Patient will be treated with Unasyn 3g. Ordered CBC with diff., and CMP to evaluate his symptoms. The differential diagnoses include but are not limited to: Infection  post dental extraction, or Medication noncompliance. Discussed with patient about administering basic labs. Patient verbalizes understanding and agreement to this plan of care.      12:01 AM - Patient was re-evaluated at bedside. Patient feels mildly improved. Discussed labs as shown above. I discussed with patient care of plan following discharge. Patient verbalizes understanding and agrees to care of plan.  Patient will be discharged at this time. Patient will be discharged with a prescription for Augmentin and a referral to establish PCP.        Decision Making:  She had dental infection and had extraction of some decayed teeth that yesterday.  Did not fill his prescriptions for antibiotics.  The patient appears to be doing well the extraction sites not appear to be excessively swollen or draining he has no fever I suspect this is doing well I went ahead and gave him a dose of IV Unasyn here and wrote a prescription for Augmentin.  Otherwise she should follow-up as originally scheduled    The patient will return for new or worsening symptoms and is stable at the time of discharge.    The patient is referred to a primary physician for blood pressure management, diabetic screening, and for all other preventative health concerns.    DISPOSITION:  Patient will be discharged home in stable condition.    FOLLOW UP:  No follow-up provider specified.6    OUTPATIENT MEDICATIONS:  Discharge Medication List as of 11/2/2021 12:54 AM        START taking these medications    Details   !! amoxicillin-clavulanate (AUGMENTIN) 875-125 MG Tab Take 1 Tablet by mouth 2 times a day., Disp-14 Tablet, R-0, Print Rx Paper       !! - Potential duplicate medications found. Please discuss with provider.           FINAL IMPRESSION  1. Post-op pain    2. Dental infection          Jori GIRON), am scribing for, and in the presence of, Dell Billingsley M.D..    Electronically signed by: Jori Kim), 11/1/2021    BREE  Dell Billingsley M.D. personally performed the services described in this documentation, as scribed by Jori Maciel in my presence, and it is both accurate and complete.    C    The note accurately reflects work and decisions made by me.  Dell Billingsley M.D.  11/2/2021  1:21 AM

## 2021-11-02 NOTE — ED NOTES
Patient educated on discharge instructions, follow up appointments, prescriptions, and home care. Patient verbalized understanding. Patient ambulated to [facility].

## 2022-08-29 ENCOUNTER — APPOINTMENT (OUTPATIENT)
Dept: RADIOLOGY | Facility: MEDICAL CENTER | Age: 37
End: 2022-08-29
Attending: EMERGENCY MEDICINE
Payer: MEDICAID

## 2022-08-29 ENCOUNTER — HOSPITAL ENCOUNTER (EMERGENCY)
Facility: MEDICAL CENTER | Age: 37
End: 2022-08-29
Attending: EMERGENCY MEDICINE
Payer: MEDICAID

## 2022-08-29 VITALS
DIASTOLIC BLOOD PRESSURE: 68 MMHG | WEIGHT: 185 LBS | OXYGEN SATURATION: 96 % | HEART RATE: 79 BPM | HEIGHT: 73 IN | SYSTOLIC BLOOD PRESSURE: 123 MMHG | TEMPERATURE: 97 F | RESPIRATION RATE: 16 BRPM | BODY MASS INDEX: 24.52 KG/M2

## 2022-08-29 DIAGNOSIS — F19.10 POLYSUBSTANCE ABUSE (HCC): ICD-10-CM

## 2022-08-29 DIAGNOSIS — T25.222A LEFT FOOT BURN, SECOND DEGREE, INITIAL ENCOUNTER: ICD-10-CM

## 2022-08-29 DIAGNOSIS — Z91.148 NONCOMPLIANCE WITH MEDICATION REGIMEN: ICD-10-CM

## 2022-08-29 LAB
ALBUMIN SERPL BCP-MCNC: 3.9 G/DL (ref 3.2–4.9)
ALBUMIN/GLOB SERPL: 1 G/DL
ALP SERPL-CCNC: 93 U/L (ref 30–99)
ALT SERPL-CCNC: 16 U/L (ref 2–50)
AMPHET UR QL SCN: POSITIVE
ANION GAP SERPL CALC-SCNC: 12 MMOL/L (ref 7–16)
APPEARANCE UR: CLEAR
AST SERPL-CCNC: 35 U/L (ref 12–45)
BARBITURATES UR QL SCN: NEGATIVE
BASOPHILS # BLD AUTO: 0.2 % (ref 0–1.8)
BASOPHILS # BLD: 0.01 K/UL (ref 0–0.12)
BENZODIAZ UR QL SCN: NEGATIVE
BILIRUB SERPL-MCNC: 0.3 MG/DL (ref 0.1–1.5)
BILIRUB UR QL STRIP.AUTO: NEGATIVE
BUN SERPL-MCNC: 8 MG/DL (ref 8–22)
BZE UR QL SCN: NEGATIVE
CALCIUM SERPL-MCNC: 9.3 MG/DL (ref 8.5–10.5)
CANNABINOIDS UR QL SCN: POSITIVE
CHLORIDE SERPL-SCNC: 102 MMOL/L (ref 96–112)
CO2 SERPL-SCNC: 26 MMOL/L (ref 20–33)
COHGB MFR BLD: 4 % (ref 0–4.9)
COLOR UR: YELLOW
CREAT SERPL-MCNC: 0.68 MG/DL (ref 0.5–1.4)
EOSINOPHIL # BLD AUTO: 0.02 K/UL (ref 0–0.51)
EOSINOPHIL NFR BLD: 0.4 % (ref 0–6.9)
ERYTHROCYTE [DISTWIDTH] IN BLOOD BY AUTOMATED COUNT: 41.7 FL (ref 35.9–50)
ETHANOL BLD-MCNC: 64.1 MG/DL
FLUAV RNA SPEC QL NAA+PROBE: NEGATIVE
FLUBV RNA SPEC QL NAA+PROBE: NEGATIVE
GFR SERPLBLD CREATININE-BSD FMLA CKD-EPI: 123 ML/MIN/1.73 M 2
GLOBULIN SER CALC-MCNC: 3.9 G/DL (ref 1.9–3.5)
GLUCOSE SERPL-MCNC: 85 MG/DL (ref 65–99)
GLUCOSE UR STRIP.AUTO-MCNC: NEGATIVE MG/DL
HCT VFR BLD AUTO: 47.9 % (ref 42–52)
HGB BLD-MCNC: 16.2 G/DL (ref 14–18)
IMM GRANULOCYTES # BLD AUTO: 0.01 K/UL (ref 0–0.11)
IMM GRANULOCYTES NFR BLD AUTO: 0.2 % (ref 0–0.9)
KETONES UR STRIP.AUTO-MCNC: NEGATIVE MG/DL
LACTATE SERPL-SCNC: 2.2 MMOL/L (ref 0.5–2)
LEUKOCYTE ESTERASE UR QL STRIP.AUTO: NEGATIVE
LYMPHOCYTES # BLD AUTO: 1.1 K/UL (ref 1–4.8)
LYMPHOCYTES NFR BLD: 21.7 % (ref 22–41)
MCH RBC QN AUTO: 30.1 PG (ref 27–33)
MCHC RBC AUTO-ENTMCNC: 33.8 G/DL (ref 33.7–35.3)
MCV RBC AUTO: 88.9 FL (ref 81.4–97.8)
METHADONE UR QL SCN: NEGATIVE
MICRO URNS: NORMAL
MONOCYTES # BLD AUTO: 0.36 K/UL (ref 0–0.85)
MONOCYTES NFR BLD AUTO: 7.1 % (ref 0–13.4)
NEUTROPHILS # BLD AUTO: 3.57 K/UL (ref 1.82–7.42)
NEUTROPHILS NFR BLD: 70.4 % (ref 44–72)
NITRITE UR QL STRIP.AUTO: NEGATIVE
NRBC # BLD AUTO: 0 K/UL
NRBC BLD-RTO: 0 /100 WBC
OPIATES UR QL SCN: NEGATIVE
OXYCODONE UR QL SCN: NEGATIVE
PCP UR QL SCN: NEGATIVE
PH UR STRIP.AUTO: 5.5 [PH] (ref 5–8)
PLATELET # BLD AUTO: 185 K/UL (ref 164–446)
PMV BLD AUTO: 9.9 FL (ref 9–12.9)
POTASSIUM SERPL-SCNC: 3.9 MMOL/L (ref 3.6–5.5)
PROPOXYPH UR QL SCN: NEGATIVE
PROT SERPL-MCNC: 7.8 G/DL (ref 6–8.2)
PROT UR QL STRIP: NEGATIVE MG/DL
RBC # BLD AUTO: 5.39 M/UL (ref 4.7–6.1)
RBC UR QL AUTO: NEGATIVE
RSV RNA SPEC QL NAA+PROBE: NEGATIVE
S PYO DNA SPEC NAA+PROBE: NOT DETECTED
SARS-COV-2 RNA RESP QL NAA+PROBE: NOTDETECTED
SODIUM SERPL-SCNC: 140 MMOL/L (ref 135–145)
SP GR UR STRIP.AUTO: 1.02
SPECIMEN SOURCE: NORMAL
UROBILINOGEN UR STRIP.AUTO-MCNC: 0.2 MG/DL
WBC # BLD AUTO: 5.1 K/UL (ref 4.8–10.8)

## 2022-08-29 PROCEDURE — 71045 X-RAY EXAM CHEST 1 VIEW: CPT

## 2022-08-29 PROCEDURE — 87651 STREP A DNA AMP PROBE: CPT

## 2022-08-29 PROCEDURE — 81003 URINALYSIS AUTO W/O SCOPE: CPT

## 2022-08-29 PROCEDURE — 80307 DRUG TEST PRSMV CHEM ANLYZR: CPT

## 2022-08-29 PROCEDURE — 87086 URINE CULTURE/COLONY COUNT: CPT

## 2022-08-29 PROCEDURE — 82077 ASSAY SPEC XCP UR&BREATH IA: CPT

## 2022-08-29 PROCEDURE — 87040 BLOOD CULTURE FOR BACTERIA: CPT

## 2022-08-29 PROCEDURE — 0241U HCHG SARS-COV-2 COVID-19 NFCT DS RESP RNA 4 TRGT MIC: CPT

## 2022-08-29 PROCEDURE — 36415 COLL VENOUS BLD VENIPUNCTURE: CPT

## 2022-08-29 PROCEDURE — 83605 ASSAY OF LACTIC ACID: CPT

## 2022-08-29 PROCEDURE — 70450 CT HEAD/BRAIN W/O DYE: CPT

## 2022-08-29 PROCEDURE — 99285 EMERGENCY DEPT VISIT HI MDM: CPT

## 2022-08-29 PROCEDURE — C9803 HOPD COVID-19 SPEC COLLECT: HCPCS | Performed by: EMERGENCY MEDICINE

## 2022-08-29 PROCEDURE — 700111 HCHG RX REV CODE 636 W/ 250 OVERRIDE (IP): Performed by: EMERGENCY MEDICINE

## 2022-08-29 PROCEDURE — 80053 COMPREHEN METABOLIC PANEL: CPT

## 2022-08-29 PROCEDURE — 96374 THER/PROPH/DIAG INJ IV PUSH: CPT

## 2022-08-29 PROCEDURE — 85025 COMPLETE CBC W/AUTO DIFF WBC: CPT

## 2022-08-29 PROCEDURE — 96375 TX/PRO/DX INJ NEW DRUG ADDON: CPT

## 2022-08-29 PROCEDURE — 82375 ASSAY CARBOXYHB QUANT: CPT

## 2022-08-29 RX ORDER — IBUPROFEN 600 MG/1
600 TABLET ORAL EVERY 6 HOURS PRN
Qty: 30 TABLET | Refills: 0 | Status: SHIPPED | OUTPATIENT
Start: 2022-08-29 | End: 2023-11-14

## 2022-08-29 RX ORDER — KETOROLAC TROMETHAMINE 30 MG/ML
30 INJECTION, SOLUTION INTRAMUSCULAR; INTRAVENOUS ONCE
Status: COMPLETED | OUTPATIENT
Start: 2022-08-29 | End: 2022-08-29

## 2022-08-29 RX ORDER — GINSENG 100 MG
1 CAPSULE ORAL 2 TIMES DAILY
Qty: 28 G | Refills: 0 | Status: SHIPPED | OUTPATIENT
Start: 2022-08-29 | End: 2023-11-14

## 2022-08-29 RX ORDER — HALOPERIDOL 5 MG/ML
5 INJECTION INTRAMUSCULAR ONCE
Status: COMPLETED | OUTPATIENT
Start: 2022-08-29 | End: 2022-08-29

## 2022-08-29 RX ORDER — DIPHENHYDRAMINE HYDROCHLORIDE 50 MG/ML
25 INJECTION INTRAMUSCULAR; INTRAVENOUS ONCE
Status: COMPLETED | OUTPATIENT
Start: 2022-08-29 | End: 2022-08-29

## 2022-08-29 RX ADMIN — DIPHENHYDRAMINE HYDROCHLORIDE 25 MG: 50 INJECTION, SOLUTION INTRAMUSCULAR; INTRAVENOUS at 12:45

## 2022-08-29 RX ADMIN — HALOPERIDOL LACTATE 5 MG: 5 INJECTION, SOLUTION INTRAMUSCULAR at 12:38

## 2022-08-29 RX ADMIN — KETOROLAC TROMETHAMINE 30 MG: 30 INJECTION, SOLUTION INTRAMUSCULAR; INTRAVENOUS at 19:06

## 2022-08-29 ASSESSMENT — FIBROSIS 4 INDEX: FIB4 SCORE: 0.87

## 2022-08-29 NOTE — ED NOTES
PIV established, 1st blood cultures and additional blood work collected and sent. Lab at bedside for second set of blood cultures.
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
## 2022-08-29 NOTE — ED NOTES
Pt swabbed for covid and strep. Pt very uncooperative and becoming agitated when awoken to be swabbed.

## 2022-08-29 NOTE — ED NOTES
"Spoke to pt's wife who says that the pt was, \"just fine,\" before she brought him over here and has no idea why he would be behaving this way. Wife stated that she and the pt's mother would be returning to the ER to check on him later today.   "
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
## 2022-08-30 NOTE — DISCHARGE INSTRUCTIONS
Wash wound daily with soap and water, apply antibiotic ointment and keep covered. Return for increasing pain, redness, fever or pus.

## 2022-08-31 LAB
BACTERIA UR CULT: NORMAL
SIGNIFICANT IND 70042: NORMAL
SITE SITE: NORMAL
SOURCE SOURCE: NORMAL

## 2023-03-25 ENCOUNTER — APPOINTMENT (OUTPATIENT)
Dept: RADIOLOGY | Facility: MEDICAL CENTER | Age: 38
End: 2023-03-25
Attending: EMERGENCY MEDICINE
Payer: MEDICAID

## 2023-03-25 ENCOUNTER — HOSPITAL ENCOUNTER (EMERGENCY)
Facility: MEDICAL CENTER | Age: 38
End: 2023-03-25
Attending: EMERGENCY MEDICINE
Payer: MEDICAID

## 2023-03-25 VITALS
TEMPERATURE: 97 F | HEART RATE: 100 BPM | OXYGEN SATURATION: 93 % | SYSTOLIC BLOOD PRESSURE: 127 MMHG | HEIGHT: 73 IN | BODY MASS INDEX: 24.52 KG/M2 | RESPIRATION RATE: 16 BRPM | WEIGHT: 185 LBS | DIASTOLIC BLOOD PRESSURE: 93 MMHG

## 2023-03-25 DIAGNOSIS — S21.111A: ICD-10-CM

## 2023-03-25 LAB
ALBUMIN SERPL BCP-MCNC: 3.8 G/DL (ref 3.2–4.9)
ALBUMIN/GLOB SERPL: 0.9 G/DL
ALP SERPL-CCNC: 96 U/L (ref 30–99)
ALT SERPL-CCNC: 13 U/L (ref 2–50)
ANION GAP SERPL CALC-SCNC: 11 MMOL/L (ref 7–16)
APTT PPP: 28 SEC (ref 24.7–36)
AST SERPL-CCNC: 26 U/L (ref 12–45)
BILIRUB SERPL-MCNC: 0.3 MG/DL (ref 0.1–1.5)
BUN SERPL-MCNC: 11 MG/DL (ref 8–22)
CALCIUM ALBUM COR SERPL-MCNC: 9.2 MG/DL (ref 8.5–10.5)
CALCIUM SERPL-MCNC: 9 MG/DL (ref 8.5–10.5)
CHLORIDE SERPL-SCNC: 99 MMOL/L (ref 96–112)
CO2 SERPL-SCNC: 23 MMOL/L (ref 20–33)
CREAT SERPL-MCNC: 0.85 MG/DL (ref 0.5–1.4)
ERYTHROCYTE [DISTWIDTH] IN BLOOD BY AUTOMATED COUNT: 42.1 FL (ref 35.9–50)
ETHANOL BLD-MCNC: <10.1 MG/DL
GFR SERPLBLD CREATININE-BSD FMLA CKD-EPI: 114 ML/MIN/1.73 M 2
GLOBULIN SER CALC-MCNC: 4.4 G/DL (ref 1.9–3.5)
GLUCOSE SERPL-MCNC: 104 MG/DL (ref 65–99)
HCT VFR BLD AUTO: 48.7 % (ref 42–52)
HGB BLD-MCNC: 16.2 G/DL (ref 14–18)
INR PPP: 0.98 (ref 0.87–1.13)
MCH RBC QN AUTO: 29.9 PG (ref 27–33)
MCHC RBC AUTO-ENTMCNC: 33.3 G/DL (ref 33.7–35.3)
MCV RBC AUTO: 89.9 FL (ref 81.4–97.8)
PLATELET # BLD AUTO: 201 K/UL (ref 164–446)
PMV BLD AUTO: 9.8 FL (ref 9–12.9)
POTASSIUM SERPL-SCNC: 4.2 MMOL/L (ref 3.6–5.5)
PROT SERPL-MCNC: 8.2 G/DL (ref 6–8.2)
PROTHROMBIN TIME: 12.9 SEC (ref 12–14.6)
RBC # BLD AUTO: 5.42 M/UL (ref 4.7–6.1)
SODIUM SERPL-SCNC: 133 MMOL/L (ref 135–145)
WBC # BLD AUTO: 7.1 K/UL (ref 4.8–10.8)

## 2023-03-25 PROCEDURE — 36415 COLL VENOUS BLD VENIPUNCTURE: CPT

## 2023-03-25 PROCEDURE — 96374 THER/PROPH/DIAG INJ IV PUSH: CPT | Mod: XU

## 2023-03-25 PROCEDURE — 90715 TDAP VACCINE 7 YRS/> IM: CPT | Performed by: EMERGENCY MEDICINE

## 2023-03-25 PROCEDURE — 700117 HCHG RX CONTRAST REV CODE 255: Performed by: EMERGENCY MEDICINE

## 2023-03-25 PROCEDURE — 71260 CT THORAX DX C+: CPT

## 2023-03-25 PROCEDURE — 99152 MOD SED SAME PHYS/QHP 5/>YRS: CPT

## 2023-03-25 PROCEDURE — 99285 EMERGENCY DEPT VISIT HI MDM: CPT

## 2023-03-25 PROCEDURE — 307744 HCHG RED TRAUMA TEAM SERVICES

## 2023-03-25 PROCEDURE — 80053 COMPREHEN METABOLIC PANEL: CPT

## 2023-03-25 PROCEDURE — 71045 X-RAY EXAM CHEST 1 VIEW: CPT

## 2023-03-25 PROCEDURE — 82077 ASSAY SPEC XCP UR&BREATH IA: CPT

## 2023-03-25 PROCEDURE — 85610 PROTHROMBIN TIME: CPT

## 2023-03-25 PROCEDURE — 700111 HCHG RX REV CODE 636 W/ 250 OVERRIDE (IP)

## 2023-03-25 PROCEDURE — 303747 HCHG EXTRA SUTURE

## 2023-03-25 PROCEDURE — 85027 COMPLETE CBC AUTOMATED: CPT

## 2023-03-25 PROCEDURE — 700101 HCHG RX REV CODE 250: Mod: UD | Performed by: EMERGENCY MEDICINE

## 2023-03-25 PROCEDURE — 94799 UNLISTED PULMONARY SVC/PX: CPT

## 2023-03-25 PROCEDURE — 96375 TX/PRO/DX INJ NEW DRUG ADDON: CPT | Mod: XU

## 2023-03-25 PROCEDURE — 12035 INTMD RPR S/A/T/EXT 12.6-20: CPT | Performed by: SURGERY

## 2023-03-25 PROCEDURE — 700111 HCHG RX REV CODE 636 W/ 250 OVERRIDE (IP): Performed by: EMERGENCY MEDICINE

## 2023-03-25 PROCEDURE — 304999 HCHG REPAIR-SIMPLE/INTERMED LEVEL 1

## 2023-03-25 PROCEDURE — 85730 THROMBOPLASTIN TIME PARTIAL: CPT

## 2023-03-25 PROCEDURE — 90471 IMMUNIZATION ADMIN: CPT

## 2023-03-25 PROCEDURE — 305308 HCHG STAPLER,SKIN,DISP.

## 2023-03-25 PROCEDURE — 304217 HCHG IRRIGATION SYSTEM

## 2023-03-25 RX ORDER — KETAMINE HYDROCHLORIDE 50 MG/ML
50 INJECTION, SOLUTION INTRAMUSCULAR; INTRAVENOUS ONCE
Status: COMPLETED | OUTPATIENT
Start: 2023-03-25 | End: 2023-03-25

## 2023-03-25 RX ORDER — SULFAMETHOXAZOLE AND TRIMETHOPRIM 800; 160 MG/1; MG/1
1 TABLET ORAL 2 TIMES DAILY
Qty: 14 TABLET | Refills: 0 | Status: ACTIVE | OUTPATIENT
Start: 2023-03-25 | End: 2023-04-01

## 2023-03-25 RX ORDER — CEFAZOLIN 2 G/1
2 INJECTION, POWDER, FOR SOLUTION INTRAMUSCULAR; INTRAVENOUS ONCE
Status: COMPLETED | OUTPATIENT
Start: 2023-03-25 | End: 2023-03-25

## 2023-03-25 RX ADMIN — CLOSTRIDIUM TETANI TOXOID ANTIGEN (FORMALDEHYDE INACTIVATED), CORYNEBACTERIUM DIPHTHERIAE TOXOID ANTIGEN (FORMALDEHYDE INACTIVATED), BORDETELLA PERTUSSIS TOXOID ANTIGEN (GLUTARALDEHYDE INACTIVATED), BORDETELLA PERTUSSIS FILAMENTOUS HEMAGGLUTININ ANTIGEN (FORMALDEHYDE INACTIVATED), BORDETELLA PERTUSSIS PERTACTIN ANTIGEN, AND BORDETELLA PERTUSSIS FIMBRIAE 2/3 ANTIGEN 0.5 ML: 5; 2; 2.5; 5; 3; 5 INJECTION, SUSPENSION INTRAMUSCULAR at 03:00

## 2023-03-25 RX ADMIN — CEFAZOLIN 2 G: 2 INJECTION, POWDER, FOR SOLUTION INTRAMUSCULAR; INTRAVENOUS at 03:00

## 2023-03-25 RX ADMIN — IOHEXOL 80 ML: 350 INJECTION, SOLUTION INTRAVENOUS at 03:30

## 2023-03-25 RX ADMIN — KETAMINE HYDROCHLORIDE 50 MG: 50 INJECTION INTRAMUSCULAR; INTRAVENOUS at 03:23

## 2023-03-25 RX ADMIN — KETAMINE HYDROCHLORIDE 50 MG: 50 INJECTION INTRAMUSCULAR; INTRAVENOUS at 03:20

## 2023-03-25 RX ADMIN — FENTANYL CITRATE 50 MCG: 50 INJECTION, SOLUTION INTRAMUSCULAR; INTRAVENOUS at 03:00

## 2023-03-25 NOTE — ED NOTES
Patient assisted onto commode. Patient assisted back onto bed with call bell within reach. Patient advised to notify staff for assistance.

## 2023-03-25 NOTE — DISCHARGE INSTRUCTIONS
- Call or seek medical attention for questions or concerns  - Follow up with the [facility] RETURN: 1 week  - Follow up with primary care provider within one weeks time  - Resume regular diet  - May take over the counter acetaminophen or ibuprofen as needed for pain  - Continue daily over the counter stool softener while on narcotics  - No operation of machinery or motorized vehicles while under the influence of narcotics  - No alcohol, marijuana or illicit drug use while under the influence of narcotics  - In the event of a narcotic overdose naloxone (Narcan) is available without a prescription from any [facility] or [facility]  - No swimming, hot tubs, baths or wound submersion until cleared by outpatient provider. May shower  - No contact sports, strenuous activities, or heavy lifting until cleared by outpatient provider  - If respiratory decompensation, persistent or worsening pain, or signs or symptoms of infection occur seek medical attention

## 2023-03-25 NOTE — ED NOTES
Patient revitaled and reassessed. VSS. Denies any changes at this time. Patient in no signs of distress. Patient told to call with any changes in symptoms or condition. Patient verbalized understanding.
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
## 2023-03-25 NOTE — PROCEDURES
WOUND CLOSURE PROCEDURE NOTE    Preop diagnosis: Right axilla laceration    Postop diagnosis: Same    Procedure: Intermediate repair of right axilla laceration totaling 15 cm    Anesthesia: Ketamine sedation    Surgeon: Bishop Vee D.O.    Indications: Open wound of the right axilla that is deep with some venous bleeding    Procedure: Patient was supine on the stretcher in the emergency department.  He was sedated with ketamine.  His right arm was extended and then the area was padded with absorbent drapes.  The right axilla was then irrigated with 1 L of saline.  Then prepped the area with Betadine and draped it out.  There was some venous bleeding in the wound which was about 6 cm deep into the apex of the axilla and about 12 cm in length.  A secondary wound that was measured 3 cm was just lateral to this.  Skin edges appear viable.  Venous bleeding was addressed as we closed the deep tissues and approximation using 3-0 Vicryl sutures.  About 12 cm were used to close the space of the deep wound and approximate the dermal edges of the skin.  The skin was then closed with staples..    The area was cleaned, dried and a sterile dressing was applied. The patient tolerated the procedure well and there were no immediate complications noted.

## 2023-03-25 NOTE — ED NOTES
Patient discharged home per ERP.  Discharge teaching and education discussed with patient. POC discussed.   Patient verbalized understanding of discharge teaching and education. No other questions at this time.     RX x 1 given to patient.   PIV removed.     VSS. Patient alert and oriented. Patient arranged ride for self. Able to ambulate off unit safely with steady gait.

## 2023-04-18 ENCOUNTER — HOSPITAL ENCOUNTER (EMERGENCY)
Facility: MEDICAL CENTER | Age: 38
End: 2023-04-18
Attending: EMERGENCY MEDICINE
Payer: MEDICAID

## 2023-04-18 ENCOUNTER — APPOINTMENT (OUTPATIENT)
Dept: RADIOLOGY | Facility: MEDICAL CENTER | Age: 38
End: 2023-04-18
Attending: EMERGENCY MEDICINE
Payer: MEDICAID

## 2023-04-18 VITALS
SYSTOLIC BLOOD PRESSURE: 126 MMHG | BODY MASS INDEX: 23.08 KG/M2 | WEIGHT: 174.16 LBS | TEMPERATURE: 98.2 F | HEIGHT: 73 IN | OXYGEN SATURATION: 98 % | RESPIRATION RATE: 14 BRPM | DIASTOLIC BLOOD PRESSURE: 88 MMHG | HEART RATE: 97 BPM

## 2023-04-18 DIAGNOSIS — L03.116 LEFT LEG CELLULITIS: ICD-10-CM

## 2023-04-18 LAB
ALBUMIN SERPL BCP-MCNC: 3.7 G/DL (ref 3.2–4.9)
ALBUMIN/GLOB SERPL: 0.8 G/DL
ALP SERPL-CCNC: 98 U/L (ref 30–99)
ALT SERPL-CCNC: 12 U/L (ref 2–50)
ANION GAP SERPL CALC-SCNC: 11 MMOL/L (ref 7–16)
AST SERPL-CCNC: 25 U/L (ref 12–45)
BASOPHILS # BLD AUTO: 0.1 % (ref 0–1.8)
BASOPHILS # BLD: 0.01 K/UL (ref 0–0.12)
BILIRUB SERPL-MCNC: 0.4 MG/DL (ref 0.1–1.5)
BUN SERPL-MCNC: 9 MG/DL (ref 8–22)
CALCIUM ALBUM COR SERPL-MCNC: 9.2 MG/DL (ref 8.5–10.5)
CALCIUM SERPL-MCNC: 9 MG/DL (ref 8.5–10.5)
CHLORIDE SERPL-SCNC: 97 MMOL/L (ref 96–112)
CO2 SERPL-SCNC: 23 MMOL/L (ref 20–33)
CREAT SERPL-MCNC: 0.77 MG/DL (ref 0.5–1.4)
CRP SERPL HS-MCNC: 3.07 MG/DL (ref 0–0.75)
EOSINOPHIL # BLD AUTO: 0.02 K/UL (ref 0–0.51)
EOSINOPHIL NFR BLD: 0.2 % (ref 0–6.9)
ERYTHROCYTE [DISTWIDTH] IN BLOOD BY AUTOMATED COUNT: 40.2 FL (ref 35.9–50)
GFR SERPLBLD CREATININE-BSD FMLA CKD-EPI: 118 ML/MIN/1.73 M 2
GLOBULIN SER CALC-MCNC: 4.6 G/DL (ref 1.9–3.5)
GLUCOSE SERPL-MCNC: 97 MG/DL (ref 65–99)
HCT VFR BLD AUTO: 43.9 % (ref 42–52)
HGB BLD-MCNC: 15.2 G/DL (ref 14–18)
IMM GRANULOCYTES # BLD AUTO: 0.02 K/UL (ref 0–0.11)
IMM GRANULOCYTES NFR BLD AUTO: 0.2 % (ref 0–0.9)
LYMPHOCYTES # BLD AUTO: 1.26 K/UL (ref 1–4.8)
LYMPHOCYTES NFR BLD: 14.3 % (ref 22–41)
MCH RBC QN AUTO: 30 PG (ref 27–33)
MCHC RBC AUTO-ENTMCNC: 34.6 G/DL (ref 33.7–35.3)
MCV RBC AUTO: 86.8 FL (ref 81.4–97.8)
MONOCYTES # BLD AUTO: 0.67 K/UL (ref 0–0.85)
MONOCYTES NFR BLD AUTO: 7.6 % (ref 0–13.4)
NEUTROPHILS # BLD AUTO: 6.83 K/UL (ref 1.82–7.42)
NEUTROPHILS NFR BLD: 77.6 % (ref 44–72)
NRBC # BLD AUTO: 0 K/UL
NRBC BLD-RTO: 0 /100 WBC
PLATELET # BLD AUTO: 181 K/UL (ref 164–446)
PMV BLD AUTO: 10 FL (ref 9–12.9)
POTASSIUM SERPL-SCNC: 4.1 MMOL/L (ref 3.6–5.5)
PROT SERPL-MCNC: 8.3 G/DL (ref 6–8.2)
RBC # BLD AUTO: 5.06 M/UL (ref 4.7–6.1)
SODIUM SERPL-SCNC: 131 MMOL/L (ref 135–145)
WBC # BLD AUTO: 8.8 K/UL (ref 4.8–10.8)

## 2023-04-18 PROCEDURE — 73562 X-RAY EXAM OF KNEE 3: CPT | Mod: LT

## 2023-04-18 PROCEDURE — 96365 THER/PROPH/DIAG IV INF INIT: CPT

## 2023-04-18 PROCEDURE — 85025 COMPLETE CBC W/AUTO DIFF WBC: CPT

## 2023-04-18 PROCEDURE — 99284 EMERGENCY DEPT VISIT MOD MDM: CPT

## 2023-04-18 PROCEDURE — 80053 COMPREHEN METABOLIC PANEL: CPT

## 2023-04-18 PROCEDURE — 93971 EXTREMITY STUDY: CPT | Mod: LT

## 2023-04-18 PROCEDURE — 700101 HCHG RX REV CODE 250: Mod: UD | Performed by: EMERGENCY MEDICINE

## 2023-04-18 PROCEDURE — 36415 COLL VENOUS BLD VENIPUNCTURE: CPT

## 2023-04-18 PROCEDURE — 86140 C-REACTIVE PROTEIN: CPT

## 2023-04-18 PROCEDURE — 93971 EXTREMITY STUDY: CPT | Mod: 26,LT | Performed by: INTERNAL MEDICINE

## 2023-04-18 RX ORDER — CLINDAMYCIN HYDROCHLORIDE 300 MG/1
300 CAPSULE ORAL 3 TIMES DAILY
Qty: 30 CAPSULE | Refills: 0 | Status: ACTIVE | OUTPATIENT
Start: 2023-04-18 | End: 2023-04-28

## 2023-04-18 RX ORDER — CLINDAMYCIN PHOSPHATE 600 MG/50ML
600 INJECTION, SOLUTION INTRAVENOUS ONCE
Status: COMPLETED | OUTPATIENT
Start: 2023-04-18 | End: 2023-04-18

## 2023-04-18 RX ADMIN — CLINDAMYCIN PHOSPHATE 600 MG: 600 INJECTION, SOLUTION INTRAVENOUS at 15:02

## 2023-04-18 ASSESSMENT — FIBROSIS 4 INDEX: FIB4 SCORE: 1.33

## 2023-04-18 ASSESSMENT — LIFESTYLE VARIABLES: DO YOU DRINK ALCOHOL: NO

## 2023-04-18 NOTE — ED NOTES
Discharge teaching and paperwork provided and all questions/concerns answered. VSS, assessment stable and PIV removed. Given information regarding Rx. Patient discharged to the care of self and ambulated out of the ED.

## 2023-04-18 NOTE — ED NOTES
Pt ambulated to the room with steady gait and without assistance. Agree with triage note. Changed into a gown and placed on monitor. Call light within reach. No further complaints at this time. Chart up for ERP.

## 2023-04-18 NOTE — ED TRIAGE NOTES
Chief Complaint   Patient presents with    Leg Swelling     Pt reports left leg pain/swelling for about 2 days to calf area. Pt denies trauma, hx of PE a few years ago and was on blood thinners but not now.      Explained to pt triage process, made pt aware to tell this RN/staff of any changes/concerns, pt verbalized understanding of process and instructions given. Pt to ER lobby.
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
## 2023-04-18 NOTE — DISCHARGE INSTRUCTIONS
Take Motrin and Tylenol as needed for pain control.  Return to the emergency department if you are acutely worse.  Follow-up with the [facility] in 48 hours.

## 2023-11-14 ENCOUNTER — APPOINTMENT (OUTPATIENT)
Dept: RADIOLOGY | Facility: MEDICAL CENTER | Age: 38
DRG: 189 | End: 2023-11-14
Attending: EMERGENCY MEDICINE
Payer: MEDICAID

## 2023-11-14 ENCOUNTER — HOSPITAL ENCOUNTER (INPATIENT)
Facility: MEDICAL CENTER | Age: 38
LOS: 2 days | DRG: 189 | End: 2023-11-16
Attending: EMERGENCY MEDICINE | Admitting: STUDENT IN AN ORGANIZED HEALTH CARE EDUCATION/TRAINING PROGRAM
Payer: MEDICAID

## 2023-11-14 DIAGNOSIS — R74.02 ELEVATED LDH: ICD-10-CM

## 2023-11-14 DIAGNOSIS — B20 SYMPTOMATIC HIV INFECTION (HCC): ICD-10-CM

## 2023-11-14 DIAGNOSIS — B37.0 THRUSH: ICD-10-CM

## 2023-11-14 DIAGNOSIS — R05.1 ACUTE COUGH: ICD-10-CM

## 2023-11-14 DIAGNOSIS — D72.829 LEUKOCYTOSIS, UNSPECIFIED TYPE: ICD-10-CM

## 2023-11-14 DIAGNOSIS — J96.01 ACUTE RESPIRATORY FAILURE WITH HYPOXIA (HCC): ICD-10-CM

## 2023-11-14 DIAGNOSIS — Z91.148 NONCOMPLIANCE WITH ANTIRETROVIRAL MEDICATION REGIMEN: ICD-10-CM

## 2023-11-14 PROBLEM — D72.825 BANDEMIA: Status: ACTIVE | Noted: 2023-11-14

## 2023-11-14 PROBLEM — F10.20 UNCOMPLICATED ALCOHOL DEPENDENCE (HCC): Status: ACTIVE | Noted: 2023-11-14

## 2023-11-14 PROBLEM — E86.0 DEHYDRATION: Status: ACTIVE | Noted: 2023-11-14

## 2023-11-14 PROBLEM — F15.10 METHAMPHETAMINE ABUSE (HCC): Status: ACTIVE | Noted: 2023-11-14

## 2023-11-14 PROBLEM — Z86.711 HISTORY OF PULMONARY EMBOLISM: Status: ACTIVE | Noted: 2023-11-14

## 2023-11-14 PROBLEM — R30.0 DYSURIA: Status: ACTIVE | Noted: 2023-11-14

## 2023-11-14 PROBLEM — D72.821 MONOCYTOSIS: Status: ACTIVE | Noted: 2023-11-14

## 2023-11-14 LAB
ALBUMIN SERPL BCP-MCNC: 4.2 G/DL (ref 3.2–4.9)
ALBUMIN/GLOB SERPL: 0.7 G/DL
ALP SERPL-CCNC: 93 U/L (ref 30–99)
ALT SERPL-CCNC: 9 U/L (ref 2–50)
AMPHET UR QL SCN: POSITIVE
ANION GAP SERPL CALC-SCNC: 13 MMOL/L (ref 7–16)
AST SERPL-CCNC: 30 U/L (ref 12–45)
BARBITURATES UR QL SCN: NEGATIVE
BASOPHILS # BLD AUTO: 0.2 % (ref 0–1.8)
BASOPHILS # BLD: 0.03 K/UL (ref 0–0.12)
BENZODIAZ UR QL SCN: NEGATIVE
BILIRUB SERPL-MCNC: 0.6 MG/DL (ref 0.1–1.5)
BUN SERPL-MCNC: 9 MG/DL (ref 8–22)
BZE UR QL SCN: NEGATIVE
CALCIUM ALBUM COR SERPL-MCNC: 9.6 MG/DL (ref 8.5–10.5)
CALCIUM SERPL-MCNC: 9.8 MG/DL (ref 8.5–10.5)
CANNABINOIDS UR QL SCN: POSITIVE
CHLORIDE SERPL-SCNC: 94 MMOL/L (ref 96–112)
CO2 SERPL-SCNC: 24 MMOL/L (ref 20–33)
CREAT SERPL-MCNC: 0.79 MG/DL (ref 0.5–1.4)
CREAT UR-MCNC: 122.65 MG/DL
CRP SERPL HS-MCNC: 16.3 MG/DL (ref 0–0.75)
D DIMER PPP IA.FEU-MCNC: 0.81 UG/ML (FEU) (ref 0–0.5)
EOSINOPHIL # BLD AUTO: 0.01 K/UL (ref 0–0.51)
EOSINOPHIL NFR BLD: 0.1 % (ref 0–6.9)
ERYTHROCYTE [DISTWIDTH] IN BLOOD BY AUTOMATED COUNT: 42.4 FL (ref 35.9–50)
ERYTHROCYTE [SEDIMENTATION RATE] IN BLOOD BY WESTERGREN METHOD: 2 MM/HOUR (ref 0–20)
FENTANYL UR QL: NEGATIVE
FLUAV RNA SPEC QL NAA+PROBE: NEGATIVE
FLUBV RNA SPEC QL NAA+PROBE: NEGATIVE
GFR SERPLBLD CREATININE-BSD FMLA CKD-EPI: 116 ML/MIN/1.73 M 2
GLOBULIN SER CALC-MCNC: 5.8 G/DL (ref 1.9–3.5)
GLUCOSE SERPL-MCNC: 94 MG/DL (ref 65–99)
HCT VFR BLD AUTO: 45.9 % (ref 42–52)
HGB BLD-MCNC: 15.7 G/DL (ref 14–18)
IMM GRANULOCYTES # BLD AUTO: 0.1 K/UL (ref 0–0.11)
IMM GRANULOCYTES NFR BLD AUTO: 0.5 % (ref 0–0.9)
LACTATE SERPL-SCNC: 1.7 MMOL/L (ref 0.5–2)
LDH SERPL L TO P-CCNC: 276 U/L (ref 107–266)
LYMPHOCYTES # BLD AUTO: 1.48 K/UL (ref 1–4.8)
LYMPHOCYTES NFR BLD: 7.8 % (ref 22–41)
MAGNESIUM SERPL-MCNC: 2 MG/DL (ref 1.5–2.5)
MCH RBC QN AUTO: 29.6 PG (ref 27–33)
MCHC RBC AUTO-ENTMCNC: 34.2 G/DL (ref 32.3–36.5)
MCV RBC AUTO: 86.6 FL (ref 81.4–97.8)
METHADONE UR QL SCN: NEGATIVE
MONOCYTES # BLD AUTO: 1.16 K/UL (ref 0–0.85)
MONOCYTES NFR BLD AUTO: 6.1 % (ref 0–13.4)
NEUTROPHILS # BLD AUTO: 16.3 K/UL (ref 1.82–7.42)
NEUTROPHILS NFR BLD: 85.3 % (ref 44–72)
NRBC # BLD AUTO: 0 K/UL
NRBC BLD-RTO: 0 /100 WBC (ref 0–0.2)
NT-PROBNP SERPL IA-MCNC: 406 PG/ML (ref 0–125)
OPIATES UR QL SCN: NEGATIVE
OSMOLALITY SERPL: 278 MOSM/KG H2O (ref 278–298)
OSMOLALITY UR: 587 MOSM/KG H2O (ref 300–900)
OXYCODONE UR QL SCN: POSITIVE
PCP UR QL SCN: NEGATIVE
PHOSPHATE SERPL-MCNC: 3.5 MG/DL (ref 2.5–4.5)
PLATELET # BLD AUTO: 228 K/UL (ref 164–446)
PMV BLD AUTO: 10.2 FL (ref 9–12.9)
POTASSIUM SERPL-SCNC: 4.2 MMOL/L (ref 3.6–5.5)
PROCALCITONIN SERPL-MCNC: 0.17 NG/ML
PROPOXYPH UR QL SCN: NEGATIVE
PROT SERPL-MCNC: 10 G/DL (ref 6–8.2)
RBC # BLD AUTO: 5.3 M/UL (ref 4.7–6.1)
RSV RNA SPEC QL NAA+PROBE: NEGATIVE
SARS-COV-2 RNA RESP QL NAA+PROBE: NOTDETECTED
SODIUM SERPL-SCNC: 131 MMOL/L (ref 135–145)
SODIUM UR-SCNC: 101 MMOL/L
SPECIMEN SOURCE: NORMAL
WBC # BLD AUTO: 19.1 K/UL (ref 4.8–10.8)

## 2023-11-14 PROCEDURE — 96374 THER/PROPH/DIAG INJ IV PUSH: CPT

## 2023-11-14 PROCEDURE — 700111 HCHG RX REV CODE 636 W/ 250 OVERRIDE (IP): Mod: JZ | Performed by: STUDENT IN AN ORGANIZED HEALTH CARE EDUCATION/TRAINING PROGRAM

## 2023-11-14 PROCEDURE — 82570 ASSAY OF URINE CREATININE: CPT

## 2023-11-14 PROCEDURE — 36415 COLL VENOUS BLD VENIPUNCTURE: CPT

## 2023-11-14 PROCEDURE — 83880 ASSAY OF NATRIURETIC PEPTIDE: CPT

## 2023-11-14 PROCEDURE — 0241U HCHG SARS-COV-2 COVID-19 NFCT DS RESP RNA 4 TRGT MIC: CPT

## 2023-11-14 PROCEDURE — 85379 FIBRIN DEGRADATION QUANT: CPT

## 2023-11-14 PROCEDURE — A9270 NON-COVERED ITEM OR SERVICE: HCPCS | Performed by: STUDENT IN AN ORGANIZED HEALTH CARE EDUCATION/TRAINING PROGRAM

## 2023-11-14 PROCEDURE — 86360 T CELL ABSOLUTE COUNT/RATIO: CPT

## 2023-11-14 PROCEDURE — 80307 DRUG TEST PRSMV CHEM ANLYZR: CPT

## 2023-11-14 PROCEDURE — 84145 PROCALCITONIN (PCT): CPT

## 2023-11-14 PROCEDURE — 700102 HCHG RX REV CODE 250 W/ 637 OVERRIDE(OP): Performed by: STUDENT IN AN ORGANIZED HEALTH CARE EDUCATION/TRAINING PROGRAM

## 2023-11-14 PROCEDURE — 83735 ASSAY OF MAGNESIUM: CPT

## 2023-11-14 PROCEDURE — A9270 NON-COVERED ITEM OR SERVICE: HCPCS | Mod: UD | Performed by: EMERGENCY MEDICINE

## 2023-11-14 PROCEDURE — 700105 HCHG RX REV CODE 258: Mod: UD | Performed by: EMERGENCY MEDICINE

## 2023-11-14 PROCEDURE — 83605 ASSAY OF LACTIC ACID: CPT

## 2023-11-14 PROCEDURE — 84100 ASSAY OF PHOSPHORUS: CPT

## 2023-11-14 PROCEDURE — 83615 LACTATE (LD) (LDH) ENZYME: CPT

## 2023-11-14 PROCEDURE — 700105 HCHG RX REV CODE 258: Performed by: STUDENT IN AN ORGANIZED HEALTH CARE EDUCATION/TRAINING PROGRAM

## 2023-11-14 PROCEDURE — 96375 TX/PRO/DX INJ NEW DRUG ADDON: CPT

## 2023-11-14 PROCEDURE — 84300 ASSAY OF URINE SODIUM: CPT

## 2023-11-14 PROCEDURE — 71045 X-RAY EXAM CHEST 1 VIEW: CPT

## 2023-11-14 PROCEDURE — 85652 RBC SED RATE AUTOMATED: CPT

## 2023-11-14 PROCEDURE — 770006 HCHG ROOM/CARE - MED/SURG/GYN SEMI*

## 2023-11-14 PROCEDURE — 86140 C-REACTIVE PROTEIN: CPT

## 2023-11-14 PROCEDURE — C9803 HOPD COVID-19 SPEC COLLECT: HCPCS | Performed by: EMERGENCY MEDICINE

## 2023-11-14 PROCEDURE — 86359 T CELLS TOTAL COUNT: CPT

## 2023-11-14 PROCEDURE — 85025 COMPLETE CBC W/AUTO DIFF WBC: CPT

## 2023-11-14 PROCEDURE — 99285 EMERGENCY DEPT VISIT HI MDM: CPT

## 2023-11-14 PROCEDURE — 700102 HCHG RX REV CODE 250 W/ 637 OVERRIDE(OP): Mod: UD | Performed by: EMERGENCY MEDICINE

## 2023-11-14 PROCEDURE — 83930 ASSAY OF BLOOD OSMOLALITY: CPT

## 2023-11-14 PROCEDURE — 83935 ASSAY OF URINE OSMOLALITY: CPT

## 2023-11-14 PROCEDURE — 80053 COMPREHEN METABOLIC PANEL: CPT

## 2023-11-14 PROCEDURE — 99223 1ST HOSP IP/OBS HIGH 75: CPT | Performed by: STUDENT IN AN ORGANIZED HEALTH CARE EDUCATION/TRAINING PROGRAM

## 2023-11-14 PROCEDURE — 700111 HCHG RX REV CODE 636 W/ 250 OVERRIDE (IP): Mod: UD | Performed by: EMERGENCY MEDICINE

## 2023-11-14 PROCEDURE — 87040 BLOOD CULTURE FOR BACTERIA: CPT

## 2023-11-14 RX ORDER — PROCHLORPERAZINE EDISYLATE 5 MG/ML
5-10 INJECTION INTRAMUSCULAR; INTRAVENOUS EVERY 4 HOURS PRN
Status: DISCONTINUED | OUTPATIENT
Start: 2023-11-14 | End: 2023-11-16 | Stop reason: HOSPADM

## 2023-11-14 RX ORDER — HYDROMORPHONE HYDROCHLORIDE 1 MG/ML
0.5 INJECTION, SOLUTION INTRAMUSCULAR; INTRAVENOUS; SUBCUTANEOUS ONCE
Status: COMPLETED | OUTPATIENT
Start: 2023-11-14 | End: 2023-11-14

## 2023-11-14 RX ORDER — PROMETHAZINE HYDROCHLORIDE 12.5 MG/1
12.5-25 SUPPOSITORY RECTAL EVERY 4 HOURS PRN
Status: DISCONTINUED | OUTPATIENT
Start: 2023-11-14 | End: 2023-11-16 | Stop reason: HOSPADM

## 2023-11-14 RX ORDER — PROMETHAZINE HYDROCHLORIDE 25 MG/1
12.5-25 TABLET ORAL EVERY 4 HOURS PRN
Status: DISCONTINUED | OUTPATIENT
Start: 2023-11-14 | End: 2023-11-16 | Stop reason: HOSPADM

## 2023-11-14 RX ORDER — POLYETHYLENE GLYCOL 3350 17 G/17G
1 POWDER, FOR SOLUTION ORAL
Status: DISCONTINUED | OUTPATIENT
Start: 2023-11-14 | End: 2023-11-16 | Stop reason: HOSPADM

## 2023-11-14 RX ORDER — SODIUM CHLORIDE 9 MG/ML
1000 INJECTION, SOLUTION INTRAVENOUS ONCE
Status: COMPLETED | OUTPATIENT
Start: 2023-11-14 | End: 2023-11-14

## 2023-11-14 RX ORDER — SODIUM CHLORIDE, SODIUM LACTATE, POTASSIUM CHLORIDE, CALCIUM CHLORIDE 600; 310; 30; 20 MG/100ML; MG/100ML; MG/100ML; MG/100ML
INJECTION, SOLUTION INTRAVENOUS CONTINUOUS
Status: DISCONTINUED | OUTPATIENT
Start: 2023-11-14 | End: 2023-11-16 | Stop reason: HOSPADM

## 2023-11-14 RX ORDER — ONDANSETRON 4 MG/1
4 TABLET, ORALLY DISINTEGRATING ORAL EVERY 4 HOURS PRN
Status: DISCONTINUED | OUTPATIENT
Start: 2023-11-14 | End: 2023-11-16 | Stop reason: HOSPADM

## 2023-11-14 RX ORDER — ACETAMINOPHEN 325 MG/1
650 TABLET ORAL ONCE
Status: COMPLETED | OUTPATIENT
Start: 2023-11-14 | End: 2023-11-14

## 2023-11-14 RX ORDER — ONDANSETRON 2 MG/ML
4 INJECTION INTRAMUSCULAR; INTRAVENOUS ONCE
Status: COMPLETED | OUTPATIENT
Start: 2023-11-14 | End: 2023-11-14

## 2023-11-14 RX ORDER — ONDANSETRON 2 MG/ML
4 INJECTION INTRAMUSCULAR; INTRAVENOUS EVERY 4 HOURS PRN
Status: DISCONTINUED | OUTPATIENT
Start: 2023-11-14 | End: 2023-11-16 | Stop reason: HOSPADM

## 2023-11-14 RX ORDER — ACETAMINOPHEN 325 MG/1
650 TABLET ORAL EVERY 6 HOURS PRN
Status: DISCONTINUED | OUTPATIENT
Start: 2023-11-14 | End: 2023-11-16 | Stop reason: HOSPADM

## 2023-11-14 RX ORDER — ACETAMINOPHEN 325 MG/1
650 TABLET ORAL
COMMUNITY

## 2023-11-14 RX ORDER — AMOXICILLIN 250 MG
2 CAPSULE ORAL 2 TIMES DAILY
Status: DISCONTINUED | OUTPATIENT
Start: 2023-11-14 | End: 2023-11-16 | Stop reason: HOSPADM

## 2023-11-14 RX ORDER — BISACODYL 10 MG
10 SUPPOSITORY, RECTAL RECTAL
Status: DISCONTINUED | OUTPATIENT
Start: 2023-11-14 | End: 2023-11-16 | Stop reason: HOSPADM

## 2023-11-14 RX ADMIN — RIVAROXABAN 10 MG: 10 TABLET, FILM COATED ORAL at 17:19

## 2023-11-14 RX ADMIN — SODIUM CHLORIDE 1000 ML: 9 INJECTION, SOLUTION INTRAVENOUS at 12:00

## 2023-11-14 RX ADMIN — AMPICILLIN AND SULBACTAM 3 G: 1; 2 INJECTION, POWDER, FOR SOLUTION INTRAMUSCULAR; INTRAVENOUS at 14:55

## 2023-11-14 RX ADMIN — AMPICILLIN AND SULBACTAM 3 G: 1; 2 INJECTION, POWDER, FOR SOLUTION INTRAMUSCULAR; INTRAVENOUS at 17:19

## 2023-11-14 RX ADMIN — ACETAMINOPHEN 650 MG: 325 TABLET, FILM COATED ORAL at 11:22

## 2023-11-14 RX ADMIN — DOCUSATE SODIUM 50 MG AND SENNOSIDES 8.6 MG 2 TABLET: 8.6; 5 TABLET, FILM COATED ORAL at 17:19

## 2023-11-14 RX ADMIN — HYDROMORPHONE HYDROCHLORIDE 0.5 MG: 1 INJECTION, SOLUTION INTRAMUSCULAR; INTRAVENOUS; SUBCUTANEOUS at 12:01

## 2023-11-14 RX ADMIN — SODIUM CHLORIDE, POTASSIUM CHLORIDE, SODIUM LACTATE AND CALCIUM CHLORIDE: 600; 310; 30; 20 INJECTION, SOLUTION INTRAVENOUS at 14:51

## 2023-11-14 RX ADMIN — ONDANSETRON 4 MG: 2 INJECTION INTRAMUSCULAR; INTRAVENOUS at 12:00

## 2023-11-14 ASSESSMENT — COGNITIVE AND FUNCTIONAL STATUS - GENERAL
SUGGESTED CMS G CODE MODIFIER MOBILITY: CH
SUGGESTED CMS G CODE MODIFIER DAILY ACTIVITY: CH
DAILY ACTIVITIY SCORE: 24
MOBILITY SCORE: 24

## 2023-11-14 ASSESSMENT — ENCOUNTER SYMPTOMS
INSOMNIA: 1
COUGH: 0
HEADACHES: 1
SHORTNESS OF BREATH: 0
PALPITATIONS: 0
BLURRED VISION: 1
DOUBLE VISION: 0
MYALGIAS: 1
SORE THROAT: 0
FEVER: 1
NAUSEA: 0
BRUISES/BLEEDS EASILY: 0
WEAKNESS: 1
ABDOMINAL PAIN: 0
NERVOUS/ANXIOUS: 1
DIZZINESS: 1
DIARRHEA: 0
MEMORY LOSS: 1
VOMITING: 0
CHILLS: 1
CONSTIPATION: 0
DEPRESSION: 1

## 2023-11-14 ASSESSMENT — PATIENT HEALTH QUESTIONNAIRE - PHQ9
1. LITTLE INTEREST OR PLEASURE IN DOING THINGS: NOT AT ALL
SUM OF ALL RESPONSES TO PHQ9 QUESTIONS 1 AND 2: 0
2. FEELING DOWN, DEPRESSED, IRRITABLE, OR HOPELESS: NOT AT ALL

## 2023-11-14 ASSESSMENT — PAIN DESCRIPTION - PAIN TYPE
TYPE: ACUTE PAIN

## 2023-11-14 ASSESSMENT — LIFESTYLE VARIABLES
HAVE YOU EVER FELT YOU SHOULD CUT DOWN ON YOUR DRINKING: NO
TOTAL SCORE: 0
AVERAGE NUMBER OF DAYS PER WEEK YOU HAVE A DRINK CONTAINING ALCOHOL: 0
EVER FELT BAD OR GUILTY ABOUT YOUR DRINKING: NO
HOW MANY TIMES IN THE PAST YEAR HAVE YOU HAD 5 OR MORE DRINKS IN A DAY: 0
HAVE PEOPLE ANNOYED YOU BY CRITICIZING YOUR DRINKING: NO
DOES PATIENT WANT TO STOP DRINKING: NO
TOTAL SCORE: 0
TOTAL SCORE: 0
ALCOHOL_USE: NO
CONSUMPTION TOTAL: NEGATIVE
EVER HAD A DRINK FIRST THING IN THE MORNING TO STEADY YOUR NERVES TO GET RID OF A HANGOVER: NO
ON A TYPICAL DAY WHEN YOU DRINK ALCOHOL HOW MANY DRINKS DO YOU HAVE: 0

## 2023-11-14 ASSESSMENT — FIBROSIS 4 INDEX: FIB4 SCORE: 1.52

## 2023-11-14 NOTE — ASSESSMENT & PLAN NOTE
States that he is drinking much less than he used to.  Currently drinking 2-3 drinks a day.    Patient will need to be counseled on decreasing alcohol intake or abstaining.  Does have history of fatty infiltration on previous CT scan back in 2016.

## 2023-11-14 NOTE — ED PROVIDER NOTES
"  ER Provider Note    Scribed for Jamil Jeff M.D. by Delores Jacobs. 11/14/2023   10:22 AM    Primary Care Provider: None noted    CHIEF COMPLAINT  Chief Complaint   Patient presents with    Dehydration     \"My mouth is so dry\"     Headache     Report to taking tylenol at 0000.     EXTERNAL RECORDS REVIEWED  Outpatient Notes: Past medical history of HIV.    HPI/ROS  LIMITATION TO HISTORY   Select: : None  OUTSIDE HISTORIAN(S):  None noted    Sagrario Berkowitz Jr. is a 38 y.o. male with a history of HIV who presents to the ED for evaluation of a malaise onset 2 days ago. He reports associated cough, headache, body aches, headache, hot and cold and fevers but denies any nausea or vomiting. The patient states he is non-compliant with his medications for the last several years. He adds that he took Tylenol last night at midnight with no relief.    PAST MEDICAL HISTORY  Past Medical History:   Diagnosis Date    HIV (human immunodeficiency virus infection) (HCC)     Infectious disease     HIV    Pulmonary embolism (HCC)        SURGICAL HISTORY  Past Surgical History:   Procedure Laterality Date    MD DENTAL SURGERY PROCEDURE  10/30/2021    Procedure: EXTRACTION, TOOTH, # 1,7,13,15,16,31 ;  Surgeon: Demetri Perez M.D.;  Location: SURGERY [facility];  Service: Dental       FAMILY HISTORY  Family History   Problem Relation Age of Onset    Thyroid Mother     Hypertension Mother     Hypertension Father        SOCIAL HISTORY   reports that he has been smoking cigarettes. He has quit using smokeless tobacco. He reports that he does not currently use alcohol. He reports that he does not currently use drugs after having used the following drugs: Inhaled.    CURRENT MEDICATIONS  Previous Medications    BACITRACIN 500 UNIT/GM OINTMENT    Apply 1 Each topically 2 times a day.    IBUPROFEN (MOTRIN) 600 MG TAB    Take 1 Tablet by mouth every 6 hours as needed for Moderate Pain.       ALLERGIES  No Known Allergies     PHYSICAL EXAM  BP " "125/76   Pulse (!) 109   Temp 36.6 °C (97.9 °F) (Temporal)   Resp 20   Ht 1.854 m (6' 1\")   Wt 78.9 kg (174 lb)   SpO2 94%   BMI 22.96 kg/m²      Nursing note and vitals reviewed.  Constitutional: Chronically ill appearing.  Uncomfortable.  HENT: Head is normocephalic and atraumatic. Oropharynx is clear and moist without exudate or erythema. Mucous membranes remarkable for white plaque consistent with thrush.  Neck: No meningismus.  Eyes: Pupils are equal, round, and reactive to light. Conjunctiva are normal.   Cardiovascular: Normal rate and regular rhythm. No murmur heard. Normal radial pulses.  Pulmonary/Chest: Breath sounds normal. No wheezes or rales.   Abdominal: Soft and non-tender. No distention    Musculoskeletal: Extremities exhibit normal range of motion without edema or tenderness.   Neurological: Awake, alert and oriented to person, place, and time. No focal deficits noted.  Skin: Skin is warm and dry. No rash.   Psychiatric: Normal mood and affect. Appropriate for clinical situation    DIAGNOSTIC STUDIES    Labs:   Results for orders placed or performed during the hospital encounter of 11/14/23   CBC WITH DIFFERENTIAL   Result Value Ref Range    WBC 19.1 (H) 4.8 - 10.8 K/uL    RBC 5.30 4.70 - 6.10 M/uL    Hemoglobin 15.7 14.0 - 18.0 g/dL    Hematocrit 45.9 42.0 - 52.0 %    MCV 86.6 81.4 - 97.8 fL    MCH 29.6 27.0 - 33.0 pg    MCHC 34.2 32.3 - 36.5 g/dL    RDW 42.4 35.9 - 50.0 fL    Platelet Count 228 164 - 446 K/uL    MPV 10.2 9.0 - 12.9 fL    Neutrophils-Polys 85.30 (H) 44.00 - 72.00 %    Lymphocytes 7.80 (L) 22.00 - 41.00 %    Monocytes 6.10 0.00 - 13.40 %    Eosinophils 0.10 0.00 - 6.90 %    Basophils 0.20 0.00 - 1.80 %    Immature Granulocytes 0.50 0.00 - 0.90 %    Nucleated RBC 0.00 0.00 - 0.20 /100 WBC    Neutrophils (Absolute) 16.30 (H) 1.82 - 7.42 K/uL    Lymphs (Absolute) 1.48 1.00 - 4.80 K/uL    Monos (Absolute) 1.16 (H) 0.00 - 0.85 K/uL    Eos (Absolute) 0.01 0.00 - 0.51 K/uL    Baso " (Absolute) 0.03 0.00 - 0.12 K/uL    Immature Granulocytes (abs) 0.10 0.00 - 0.11 K/uL    NRBC (Absolute) 0.00 K/uL   COMP METABOLIC PANEL   Result Value Ref Range    Sodium 131 (L) 135 - 145 mmol/L    Potassium 4.2 3.6 - 5.5 mmol/L    Chloride 94 (L) 96 - 112 mmol/L    Co2 24 20 - 33 mmol/L    Anion Gap 13.0 7.0 - 16.0    Glucose 94 65 - 99 mg/dL    Bun 9 8 - 22 mg/dL    Creatinine 0.79 0.50 - 1.40 mg/dL    Calcium 9.8 8.5 - 10.5 mg/dL    Correct Calcium 9.6 8.5 - 10.5 mg/dL    AST(SGOT) 30 12 - 45 U/L    ALT(SGPT) 9 2 - 50 U/L    Alkaline Phosphatase 93 30 - 99 U/L    Total Bilirubin 0.6 0.1 - 1.5 mg/dL    Albumin 4.2 3.2 - 4.9 g/dL    Total Protein 10.0 (H) 6.0 - 8.2 g/dL    Globulin 5.8 (H) 1.9 - 3.5 g/dL    A-G Ratio 0.7 g/dL   LACTIC ACID   Result Value Ref Range    Lactic Acid 1.7 0.5 - 2.0 mmol/L   CoV-2, FLU A/B, and RSV by PCR (2-4 Hours CEPHEID) : Collect NP swab in VTM    Specimen: Respirate   Result Value Ref Range    Influenza virus A RNA Negative Negative    Influenza virus B, PCR Negative Negative    RSV, PCR Negative Negative    SARS-CoV-2 by PCR NotDetected     SARS-CoV-2 Source NP Swab    LDH   Result Value Ref Range    LDH Total 276 (H) 107 - 266 U/L   ESTIMATED GFR   Result Value Ref Range    GFR (CKD-EPI) 116 >60 mL/min/1.73 m 2     INITIAL ASSESSMENT AND PLAN    10:22 AM - Patient was evaluated at bedside. The patient is non-compliant with his HIV medications. Ordered for Dx-chest, LDH, SARS-CoV-2, Flu A/B, and RSV, CD4/CD8 ratio, CBC with diff, CMP, Blood Culture and Lactic Acid to evaluate. The patient will be medicated with Tylenol 650 mg for his symptoms. Patient verbalizes understanding and support with my plan of care.  Differential diagnoses include but not limited to: Sepsis, pneumonia, COVID, Flu.      ED Observation Status? Yes; I am placing the patient in to an observation status due to a diagnostic uncertainty as well as therapeutic intensity. Patient placed in observation status at  10:22 AM, 11/14/2023.     Observation plan is as follows: The patient will be observed pending labs, imaging and treatment of his symptoms.    Upon Reevaluation, the patient's condition has: not improved; and will be escalated to hospitalization.    Patient discharged from ED Observation status at 12:59 PM (Time) 11/14/2023 (Date).      COURSE AND MEDICAL DECISION MAKING    11:41 AM - The patient will be treated with Zofran 4 mg and Dilaudid 0.5 mg.    12:58 PM - LDH is elevated concerning for PCP pneumonia. WBC elevated at 19. Will plan for hospitalization.    1:15 PM - I discussed the patient's case and the above findings with Dr. Finn (hospitalist) who will consult on the patient for hospitalization.      HYDRATION: Based on the patient's presentation of Dehydration the patient was given IV fluids. IV Hydration was used because oral hydration was not adequate alone. Upon recheck following hydration, the patient was improved.    The patient was treated with Zofran for nausea.  Placed on a cardiac monitor to monitor for any arrhythmia associated with Zofran and QT prolongation    DISPOSITION AND DISCUSSIONS    I have discussed management of the patient with the following physicians and PAUL's:  Dr. Finn (hospitalist)     Discussion of management with other [facility] or appropriate source(s): None     DISPOSITION:  Patient will be hospitalized by Dr. Finn (hospitalist)  in guarded condition.    FINAL DIAGNOSIS  1. Symptomatic HIV infection (HCC)    2. Acute cough    3. Leukocytosis, unspecified type    4. Noncompliance with antiretroviral medication regimen    5. Elevated LDH    6. Thrush         Delores GIRON (Janee), am scribing for, and in the presence of, Jamil Jeff M.D..    Electronically signed by: Delores Jacobs (Janee), 11/14/2023    Jamil GIRON M.D. personally performed the services described in this documentation, as scribed by Delores Jacobs in my presence, and it is both accurate and complete.      The  note accurately reflects work and decisions made by me.  Jamil Jeff M.D.  11/14/2023  4:23 PM

## 2023-11-14 NOTE — PROGRESS NOTES
4 Eyes Skin Assessment Completed by NELY Ames and NELY Lopez.    Head WDL  Ears WDL  Nose WDL  Mouth crusted sores, dry  Neck WDL  Breast/Chest WDL  Shoulder Blades WDL  Spine Scar  (R) Arm/Elbow/Hand Bruising, Scab, and Scar  (L) Arm/Elbow/Hand Bruising, Scab, and Scar  Abdomen WDL  Groin WDL  Scrotum/Coccyx/Buttocks WDL  (R) Leg Scar, Scab, and Bruising  (L) Leg Scar, Scab, and Bruising  (R) Heel/Foot/Toe Callousy, dry cracked skin  (L) Heel/Foot/Toe Callousy, dry cracked skin          Devices In Places Pulse Ox and Nasal Cannula      Interventions In Place NC W/Ear Foams    Possible Skin Injury No    Pictures Uploaded Into Epic N/A  Wound Consult Placed N/A  RN Wound Prevention Protocol Ordered No

## 2023-11-14 NOTE — PROGRESS NOTES
Assumed care of pt  at 1430. Report received from Becky MCKAY. Pt is A&O x 4 but is drowsy and required sternal rubs to wake up. Pt stated that this is due to receiving dilaudid in the ED, and that this is his first time. Patient stated that their pain is 0/10. Pt's pain comfort goal is 0/10. Significant other is at bedside. Vitals stable, on 1L NC statting in the 90s. Pt shows no signs or symptoms of distress or difficulty with respirations. Skin check and assessment completed. Pt is upself and independent and is stable and steady on his feet.     Pt educated on how to use the call light, pt verbalized understanding. Bed in lowest locked position, call light within reach, hourly rounding in place. Labs reviewed, orders reviewed, communication board updated. Pt declines any further needs at this time

## 2023-11-14 NOTE — ED NOTES
Med Rec complete per patient   Allergies reviewed  Antibiotics in the past 30 days:NO  Anticoagulant in past 14 days:NO  Pharmacy patient utilizes:[facility]    Pt states he does not take any RX medications

## 2023-11-14 NOTE — H&P
"Hospital Medicine History & Physical Note    Date of Service  11/14/2023    Primary Care Physician  Pcp Pt States None    Consultants       Code Status  Full Code    Chief Complaint  Chief Complaint   Patient presents with    Dehydration     \"My mouth is so dry\"     Headache     Report to taking tylenol at 0000.       History of Presenting Illness  Sagrario Berkowitz Jr. is a 38 y.o. male who presented 11/14/2023 with fevers, chills, and headache.  This is a pleasant gentleman with a history HIV with noncompliance with medications, alcohol use disorder with dependence, and pulmonary embolism in 2016 status post anticoagulation therapy.  Patient reports that he started having some fevers and chills for the past 3 to 4 days.  He also reports a progressive headache that has been worsening with cough and sneezing.  He states that he has not been taking his HIV medications for 2 years and when asked why states \"out of stupidity.\"  He does not member his last visit to the [facility] but believes it was approximately 2 years ago.  Denies any history of IV drug use but does report having sex with men and woman.  He also reports urinary urgency as well as dysuria.  No abdominal pain, nausea, vomiting, or diarrhea.  He does continue to drink 2-3 drinks a day.  His last use of methamphetamine was a few days ago.    In the emergency room, patient was noted to have a elevated white count of 19.1 with a left shift.  Leukopenia noted.  Low sodium 131, elevated total protein of 10 and elevated globulin of 5.8.  Elevated LDH of 276 possibly suggestive of pneumocystis.  COVID-19 and viral panel testing was negative.    Patient received 0.5 mg of IV Dilaudid, after which she was noted to be hypoxic down to 80% on room air requiring 1 LPM of oxygen via nasal.  Personally checked his oxygen levels off of oxygen and did back down to 79% on room air.      Chest x-ray per my read was clear with no significant infiltrates or pleural " effusions.    Per record review, his last CT angiogram of the chest on 2/1/2016 showed resolution of his pulmonary emboli.  Fatty infiltration of the liver noted on CT scan on 1/21/2016.  Per review of patient's discharge summary from 10/31/2021, patient was admitted for 5-day hospitalization course when he presented with acute encephalopathy requiring intubation for airway protection and admission to the intensive care unit.  He was found to have a dental abscess through the anterior alveolar bone, which was treated with IV Unasyn and teeth extraction.  RPR testing at that time was negative for treponemal antibody.  He was noted to be noncompliant with his medications at that time.    I discussed the plan of care with patient and ER physician Dr. Jeff .    Review of Systems  Review of Systems   Constitutional:  Positive for chills, fever and malaise/fatigue.   HENT:  Negative for ear pain and sore throat.    Eyes:  Positive for blurred vision. Negative for double vision.   Respiratory:  Negative for cough and shortness of breath.    Cardiovascular:  Negative for chest pain and palpitations.   Gastrointestinal:  Negative for abdominal pain, constipation, diarrhea, nausea and vomiting.   Genitourinary:  Positive for frequency and urgency. Negative for dysuria.   Musculoskeletal:  Positive for joint pain and myalgias.   Skin:  Positive for itching and rash.   Neurological:  Positive for dizziness, weakness and headaches.   Endo/Heme/Allergies:  Negative for environmental allergies. Does not bruise/bleed easily.   Psychiatric/Behavioral:  Positive for depression and memory loss. The patient is nervous/anxious and has insomnia.        Past Medical History   has a past medical history of HIV (human immunodeficiency virus infection) (HCC), Infectious disease, and Pulmonary embolism (HCC).    Surgical History   has a past surgical history that includes pr dental surgery procedure (10/30/2021).     Family  History  family history includes Hypertension in his father and mother; Thyroid in his mother.   Family history reviewed with patient. There is no family history that is pertinent to the chief complaint.     Social History   reports that he has been smoking cigarettes. He has quit using smokeless tobacco. He reports that he does not currently use alcohol. He reports that he does not currently use drugs after having used the following drugs: Inhaled.    Allergies  No Known Allergies    Medications  Prior to Admission Medications   Prescriptions Last Dose Informant Patient Reported? Taking?   bacitracin 500 UNIT/GM ointment   No No   Sig: Apply 1 Each topically 2 times a day.   ibuprofen (MOTRIN) 600 MG Tab   No No   Sig: Take 1 Tablet by mouth every 6 hours as needed for Moderate Pain.      Facility-Administered Medications: None       Physical Exam  Temp:  [36.6 °C (97.9 °F)-37.6 °C (99.6 °F)] 37.6 °C (99.6 °F)  Pulse:  [] 89  Resp:  [18-24] 22  BP: (112-133)/(67-83) 112/78  SpO2:  [92 %-100 %] 99 %  Blood Pressure: 120/67   Temperature: 36.6 °C (97.9 °F)   Pulse: 79   Respiration: 18   Pulse Oximetry: 100 %       Physical Exam  Vitals and nursing note reviewed.   Constitutional:       General: He is not in acute distress.     Appearance: He is ill-appearing. He is not toxic-appearing or diaphoretic.   HENT:      Head: Normocephalic and atraumatic.      Right Ear: External ear normal.      Left Ear: External ear normal.      Nose: Nose normal.      Mouth/Throat:      Mouth: Mucous membranes are moist.      Pharynx: No oropharyngeal exudate or posterior oropharyngeal erythema.      Comments: Poor dentition with ulcerations on the corners of his mouth.  No evidence of thrush.  Eyes:      General:         Right eye: No discharge.         Left eye: No discharge.      Conjunctiva/sclera: Conjunctivae normal.      Pupils: Pupils are equal, round, and reactive to light.   Neck:      Vascular: Hepatojugular reflux and  "JVD present.   Cardiovascular:      Rate and Rhythm: Normal rate and regular rhythm.      Pulses: Normal pulses.      Heart sounds: Murmur heard.      Systolic murmur is present with a grade of 2/6.      No gallop.   Pulmonary:      Effort: Pulmonary effort is normal.      Breath sounds: No wheezing or rhonchi.   Abdominal:      General: Abdomen is flat. Bowel sounds are normal.      Palpations: Abdomen is soft.      Tenderness: There is no abdominal tenderness.   Musculoskeletal:         General: No tenderness.      Cervical back: Neck supple. No tenderness.      Right lower leg: No edema.      Left lower leg: No edema.   Lymphadenopathy:      Cervical: No cervical adenopathy.   Skin:     General: Skin is warm and dry.      Coloration: Skin is pale.      Findings: No erythema.   Neurological:      Mental Status: He is alert and oriented to person, place, and time.      Sensory: No sensory deficit.      Motor: No weakness.   Psychiatric:         Behavior: Behavior normal.         Thought Content: Thought content normal.         Judgment: Judgment normal.         Laboratory:  Recent Labs     11/14/23  1138   WBC 19.1*   RBC 5.30   HEMOGLOBIN 15.7   HEMATOCRIT 45.9   MCV 86.6   MCH 29.6   MCHC 34.2   RDW 42.4   PLATELETCT 228   MPV 10.2     Recent Labs     11/14/23  1138   SODIUM 131*   POTASSIUM 4.2   CHLORIDE 94*   CO2 24   GLUCOSE 94   BUN 9   CREATININE 0.79   CALCIUM 9.8     Recent Labs     11/14/23  1138   ALTSGPT 9   ASTSGOT 30   ALKPHOSPHAT 93   TBILIRUBIN 0.6   GLUCOSE 94         No results for input(s): \"NTPROBNP\" in the last 72 hours.      No results for input(s): \"TROPONINT\" in the last 72 hours.    Imaging:  DX-CHEST-PORTABLE (1 VIEW)   Final Result      No acute cardiac or pulmonary abnormalities are identified.          I have personally reviewed the patient's chest x-ray.  Per my read, clear lung volumes bilaterally with no significant interstitial or focal infiltrates.  Sharp costophrenic angles " "bilaterally.    Assessment/Plan:  Justification for Admission Status  I anticipate this patient will require at least two midnights for appropriate medical management, necessitating inpatient admission because of acute respiratory failure with hypoxia in setting of HIV with noncompliance with medications with concern for immunocompromisationerv an opportunistic infection.    Patient will need a Med/Surg bed on MEDICAL service .  The need is secondary to acute respiratory failure with hypoxia.    * Acute respiratory failure with hypoxia (HCC)- (present on admission)  Assessment & Plan  Patient hypoxic down to 80% on room air requiring 1 LPM oxygen via nasal cannula.  Patient has a history of HIV noncompliant with medications for the past 2 years.  Unknown CD4 count, which is pending.  He is at high risk of PJP opportunistic infection.  Patient also has a leukocytosis although his chest x-ray is clear.  He does have a history of severe dental infection status post teeth extraction per discharge summary reviewed in October 2021.      Concern for HFrEF history of methamphetamine use and JVD.  Check NT proBNP, if elevated proceed with echocardiogram    Admit to medical floor inpatient status  Check ESR, CRP, and procalcitonin.  Blood cultures x2 and urinalysis ordered  Start Unasyn  Continue oxygen as per respiratory protocol.  Continuous pulse oximetry.  Respiratory therapy consult.  Incentive spirometry.  D-dimer ordered, if elevated consider CT angiogram of the chest.        Monocytosis- (present on admission)  Assessment & Plan  Etiology of patient's monocytosis remains unclear.    I have ordered ESR, CRP and procalcitonin levels.    Starting empiric treatment of an infection with Unasyn after blood cultures and urinalysis obtained.    Noncompliance with medication regimen- (present on admission)  Assessment & Plan  Patient states that he has been noncompliant with his medications out of \"stupidity.\"  However, he is " interested in resuming medications.    I requested a consultation with Dr. Jaxson Up of infectious diseases.    Uncomplicated alcohol dependence (HCC)- (present on admission)  Assessment & Plan  States that he is drinking much less than he used to.  Currently drinking 2-3 drinks a day.    Patient will need to be counseled on decreasing alcohol intake or abstaining.  Does have history of fatty infiltration on previous CT scan back in 2016.    Methamphetamine abuse (HCC)- (present on admission)  Assessment & Plan  Per history last use approximately a week ago.  Patient counseled on cessation      Dysuria- (present on admission)  Assessment & Plan  Reporting dysuria with urgency.  I ordered urinalysis and bladder scan.    Dehydration- (present on admission)  Assessment & Plan  After presentation.  Starting  mils per hour.    History of pulmonary embolism- (present on admission)  Assessment & Plan  As per history many years ago.  Repeat CT scan in 2016 shows resolution of pulmonary embolism.    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  Repeat CD4 count pending.    HIV (human immunodeficiency virus infection) (HCC)- (present on admission)  Assessment & Plan  Patient having persistent fevers and chills in setting of noncompliance of HIV medications.    He wants to start HIV medications again.    I have reached out to Dr. Up for consideration of restarting HIV medications.        VTE prophylaxis: SCDs/TEDs and Xarelto 10 mg daily as prophylaxis

## 2023-11-14 NOTE — ED NOTES
Pt ambulated to the room with steady gait and without assistance. Agree with triage note. No further complaints at this time. Chart up for ERP.

## 2023-11-14 NOTE — ED NOTES
Urine sample collected and sent. Patient to floor via transport tech. Patient in stable condition with no acute changes. Chart and all belongings with patient.

## 2023-11-14 NOTE — ED TRIAGE NOTES
"Chief Complaint   Patient presents with    Dehydration     \"My mouth is so dry\"     Headache     Report to taking tylenol at 0000.     Pt to triage for above complaint. Pt states he is dehydrated because his mouth is dry. Pt states he is able to tolerate PO intake. Pt also complaining of a headache for 2 days.     Pt educated on triage process and placed in lobby.      /76   Pulse (!) 109   Temp 36.6 °C (97.9 °F) (Temporal)   Resp 20   Ht 1.854 m (6' 1\")   Wt 78.9 kg (174 lb)   SpO2 94%       "

## 2023-11-14 NOTE — ASSESSMENT & PLAN NOTE
"Patient states that he has been noncompliant with his medications out of \"stupidity.\"  However, he is interested in resuming medications.  Needs to follow-up outpatient  "

## 2023-11-14 NOTE — ASSESSMENT & PLAN NOTE
CTA chest negative for PE  BNP elevated, TTE pending  Wean oxygen as tolerated  Continue on Bactrim and stop Unasyn per infectious disease

## 2023-11-15 ENCOUNTER — APPOINTMENT (OUTPATIENT)
Dept: CARDIOLOGY | Facility: MEDICAL CENTER | Age: 38
DRG: 189 | End: 2023-11-15
Attending: INTERNAL MEDICINE
Payer: MEDICAID

## 2023-11-15 ENCOUNTER — APPOINTMENT (OUTPATIENT)
Dept: RADIOLOGY | Facility: MEDICAL CENTER | Age: 38
DRG: 189 | End: 2023-11-15
Attending: INTERNAL MEDICINE
Payer: MEDICAID

## 2023-11-15 LAB
ALBUMIN SERPL BCP-MCNC: 3.6 G/DL (ref 3.2–4.9)
ALBUMIN/GLOB SERPL: 0.8 G/DL
ALP SERPL-CCNC: 75 U/L (ref 30–99)
ALT SERPL-CCNC: 10 U/L (ref 2–50)
ANION GAP SERPL CALC-SCNC: 6 MMOL/L (ref 7–16)
ANNOTATION COMMENT IMP: ABNORMAL
APPEARANCE UR: CLEAR
AST SERPL-CCNC: 26 U/L (ref 12–45)
BACTERIA #/AREA URNS HPF: NEGATIVE /HPF
BASOPHILS # BLD AUTO: 0.2 % (ref 0–1.8)
BASOPHILS # BLD: 0.02 K/UL (ref 0–0.12)
BILIRUB SERPL-MCNC: 0.4 MG/DL (ref 0.1–1.5)
BILIRUB UR QL STRIP.AUTO: NEGATIVE
BUN SERPL-MCNC: 9 MG/DL (ref 8–22)
C TRACH DNA SPEC QL NAA+PROBE: NEGATIVE
CALCIUM ALBUM COR SERPL-MCNC: 9.4 MG/DL (ref 8.5–10.5)
CALCIUM SERPL-MCNC: 9.1 MG/DL (ref 8.5–10.5)
CD3 CELLS # BLD: 792 CELLS/UL (ref 570–2400)
CD3+CD4+ CELLS # BLD: 61 CELLS/UL (ref 430–1800)
CD3+CD4+ CELLS/CD3+CD8+ CLL BLD: 0.09 RATIO (ref 0.8–3.9)
CD3+CD8+ CELLS # BLD: 654 CELLS/UL (ref 210–1200)
CHLORIDE SERPL-SCNC: 100 MMOL/L (ref 96–112)
CO2 SERPL-SCNC: 29 MMOL/L (ref 20–33)
COLOR UR: YELLOW
CREAT SERPL-MCNC: 0.73 MG/DL (ref 0.5–1.4)
EOSINOPHIL # BLD AUTO: 0.01 K/UL (ref 0–0.51)
EOSINOPHIL NFR BLD: 0.1 % (ref 0–6.9)
EPI CELLS #/AREA URNS HPF: NEGATIVE /HPF
ERYTHROCYTE [DISTWIDTH] IN BLOOD BY AUTOMATED COUNT: 44.2 FL (ref 35.9–50)
GFR SERPLBLD CREATININE-BSD FMLA CKD-EPI: 119 ML/MIN/1.73 M 2
GLOBULIN SER CALC-MCNC: 4.6 G/DL (ref 1.9–3.5)
GLUCOSE SERPL-MCNC: 104 MG/DL (ref 65–99)
GLUCOSE UR STRIP.AUTO-MCNC: NEGATIVE MG/DL
HCT VFR BLD AUTO: 42.3 % (ref 42–52)
HGB BLD-MCNC: 13.4 G/DL (ref 14–18)
HYALINE CASTS #/AREA URNS LPF: ABNORMAL /LPF
IMM GRANULOCYTES # BLD AUTO: 0.05 K/UL (ref 0–0.11)
IMM GRANULOCYTES NFR BLD AUTO: 0.4 % (ref 0–0.9)
KETONES UR STRIP.AUTO-MCNC: NEGATIVE MG/DL
LEUKOCYTE ESTERASE UR QL STRIP.AUTO: ABNORMAL
LV EJECT FRACT MOD 2C 99903: 65.94
LV EJECT FRACT MOD 4C 99902: 64.15
LV EJECT FRACT MOD BP 99901: 64.28
LYMPHOCYTES # BLD AUTO: 1.3 K/UL (ref 1–4.8)
LYMPHOCYTES NFR BLD: 10.9 % (ref 22–41)
MAGNESIUM SERPL-MCNC: 2.3 MG/DL (ref 1.5–2.5)
MCH RBC QN AUTO: 28.4 PG (ref 27–33)
MCHC RBC AUTO-ENTMCNC: 31.7 G/DL (ref 32.3–36.5)
MCV RBC AUTO: 89.6 FL (ref 81.4–97.8)
MICRO URNS: ABNORMAL
MONOCYTES # BLD AUTO: 1.05 K/UL (ref 0–0.85)
MONOCYTES NFR BLD AUTO: 8.8 % (ref 0–13.4)
N GONORRHOEA DNA SPEC QL NAA+PROBE: POSITIVE
NEUTROPHILS # BLD AUTO: 9.52 K/UL (ref 1.82–7.42)
NEUTROPHILS NFR BLD: 79.6 % (ref 44–72)
NITRITE UR QL STRIP.AUTO: NEGATIVE
NRBC # BLD AUTO: 0 K/UL
NRBC BLD-RTO: 0 /100 WBC (ref 0–0.2)
PH UR STRIP.AUTO: 7 [PH] (ref 5–8)
PLATELET # BLD AUTO: 198 K/UL (ref 164–446)
PMV BLD AUTO: 10.1 FL (ref 9–12.9)
POTASSIUM SERPL-SCNC: 4.2 MMOL/L (ref 3.6–5.5)
PROT SERPL-MCNC: 8.2 G/DL (ref 6–8.2)
PROT UR QL STRIP: NEGATIVE MG/DL
RBC # BLD AUTO: 4.72 M/UL (ref 4.7–6.1)
RBC # URNS HPF: ABNORMAL /HPF
RBC UR QL AUTO: NEGATIVE
RPR SER QL: NON REACTIVE
SODIUM SERPL-SCNC: 135 MMOL/L (ref 135–145)
SP GR UR STRIP.AUTO: 1.03
SPECIMEN SOURCE: ABNORMAL
T PALLIDUM AB SER QL IA: REACTIVE
UROBILINOGEN UR STRIP.AUTO-MCNC: 2 MG/DL
WBC # BLD AUTO: 12 K/UL (ref 4.8–10.8)
WBC #/AREA URNS HPF: ABNORMAL /HPF

## 2023-11-15 PROCEDURE — 86780 TREPONEMA PALLIDUM: CPT

## 2023-11-15 PROCEDURE — 700111 HCHG RX REV CODE 636 W/ 250 OVERRIDE (IP): Mod: JZ | Performed by: STUDENT IN AN ORGANIZED HEALTH CARE EDUCATION/TRAINING PROGRAM

## 2023-11-15 PROCEDURE — 700102 HCHG RX REV CODE 250 W/ 637 OVERRIDE(OP): Performed by: INTERNAL MEDICINE

## 2023-11-15 PROCEDURE — 99232 SBSQ HOSP IP/OBS MODERATE 35: CPT | Performed by: INTERNAL MEDICINE

## 2023-11-15 PROCEDURE — 85025 COMPLETE CBC W/AUTO DIFF WBC: CPT

## 2023-11-15 PROCEDURE — 80053 COMPREHEN METABOLIC PANEL: CPT

## 2023-11-15 PROCEDURE — 770006 HCHG ROOM/CARE - MED/SURG/GYN SEMI*

## 2023-11-15 PROCEDURE — 93306 TTE W/DOPPLER COMPLETE: CPT | Mod: 26 | Performed by: INTERNAL MEDICINE

## 2023-11-15 PROCEDURE — A9270 NON-COVERED ITEM OR SERVICE: HCPCS | Performed by: STUDENT IN AN ORGANIZED HEALTH CARE EDUCATION/TRAINING PROGRAM

## 2023-11-15 PROCEDURE — 36415 COLL VENOUS BLD VENIPUNCTURE: CPT

## 2023-11-15 PROCEDURE — 700105 HCHG RX REV CODE 258: Performed by: STUDENT IN AN ORGANIZED HEALTH CARE EDUCATION/TRAINING PROGRAM

## 2023-11-15 PROCEDURE — 87491 CHLMYD TRACH DNA AMP PROBE: CPT

## 2023-11-15 PROCEDURE — 83735 ASSAY OF MAGNESIUM: CPT

## 2023-11-15 PROCEDURE — 700102 HCHG RX REV CODE 250 W/ 637 OVERRIDE(OP)

## 2023-11-15 PROCEDURE — 700102 HCHG RX REV CODE 250 W/ 637 OVERRIDE(OP): Performed by: STUDENT IN AN ORGANIZED HEALTH CARE EDUCATION/TRAINING PROGRAM

## 2023-11-15 PROCEDURE — A9270 NON-COVERED ITEM OR SERVICE: HCPCS

## 2023-11-15 PROCEDURE — A9270 NON-COVERED ITEM OR SERVICE: HCPCS | Performed by: INTERNAL MEDICINE

## 2023-11-15 PROCEDURE — 86592 SYPHILIS TEST NON-TREP QUAL: CPT

## 2023-11-15 PROCEDURE — 87591 N.GONORRHOEAE DNA AMP PROB: CPT

## 2023-11-15 PROCEDURE — 93306 TTE W/DOPPLER COMPLETE: CPT

## 2023-11-15 PROCEDURE — 99253 IP/OBS CNSLTJ NEW/EST LOW 45: CPT | Mod: GC | Performed by: INTERNAL MEDICINE

## 2023-11-15 PROCEDURE — 700117 HCHG RX CONTRAST REV CODE 255: Performed by: INTERNAL MEDICINE

## 2023-11-15 PROCEDURE — 81001 URINALYSIS AUTO W/SCOPE: CPT

## 2023-11-15 PROCEDURE — 71275 CT ANGIOGRAPHY CHEST: CPT

## 2023-11-15 RX ORDER — SULFAMETHOXAZOLE AND TRIMETHOPRIM 800; 160 MG/1; MG/1
1 TABLET ORAL DAILY
Status: DISCONTINUED | OUTPATIENT
Start: 2023-11-15 | End: 2023-11-16 | Stop reason: HOSPADM

## 2023-11-15 RX ORDER — FLUCONAZOLE 200 MG/1
200 TABLET ORAL DAILY
Status: DISCONTINUED | OUTPATIENT
Start: 2023-11-15 | End: 2023-11-16 | Stop reason: HOSPADM

## 2023-11-15 RX ORDER — NICOTINE 21 MG/24HR
21 PATCH, TRANSDERMAL 24 HOURS TRANSDERMAL
Status: DISCONTINUED | OUTPATIENT
Start: 2023-11-15 | End: 2023-11-16 | Stop reason: HOSPADM

## 2023-11-15 RX ADMIN — SODIUM CHLORIDE, POTASSIUM CHLORIDE, SODIUM LACTATE AND CALCIUM CHLORIDE: 600; 310; 30; 20 INJECTION, SOLUTION INTRAVENOUS at 17:06

## 2023-11-15 RX ADMIN — IOHEXOL 60 ML: 350 INJECTION, SOLUTION INTRAVENOUS at 08:10

## 2023-11-15 RX ADMIN — SULFAMETHOXAZOLE AND TRIMETHOPRIM 1 TABLET: 800; 160 TABLET ORAL at 17:05

## 2023-11-15 RX ADMIN — NYSTATIN 500000 UNITS: 100000 SUSPENSION ORAL at 12:25

## 2023-11-15 RX ADMIN — DOCUSATE SODIUM 50 MG AND SENNOSIDES 8.6 MG 2 TABLET: 8.6; 5 TABLET, FILM COATED ORAL at 06:34

## 2023-11-15 RX ADMIN — AMPICILLIN AND SULBACTAM 3 G: 1; 2 INJECTION, POWDER, FOR SOLUTION INTRAMUSCULAR; INTRAVENOUS at 06:39

## 2023-11-15 RX ADMIN — NYSTATIN 500000 UNITS: 100000 SUSPENSION ORAL at 20:33

## 2023-11-15 RX ADMIN — SODIUM CHLORIDE, POTASSIUM CHLORIDE, SODIUM LACTATE AND CALCIUM CHLORIDE: 600; 310; 30; 20 INJECTION, SOLUTION INTRAVENOUS at 01:08

## 2023-11-15 RX ADMIN — FLUCONAZOLE 200 MG: 200 TABLET ORAL at 17:05

## 2023-11-15 RX ADMIN — NICOTINE TRANSDERMAL SYSTEM 21 MG: 21 PATCH, EXTENDED RELEASE TRANSDERMAL at 20:33

## 2023-11-15 RX ADMIN — ACETAMINOPHEN 650 MG: 325 TABLET, FILM COATED ORAL at 00:21

## 2023-11-15 RX ADMIN — NYSTATIN 500000 UNITS: 100000 SUSPENSION ORAL at 17:05

## 2023-11-15 RX ADMIN — AMPICILLIN AND SULBACTAM 3 G: 1; 2 INJECTION, POWDER, FOR SOLUTION INTRAMUSCULAR; INTRAVENOUS at 11:41

## 2023-11-15 RX ADMIN — AMPICILLIN AND SULBACTAM 3 G: 1; 2 INJECTION, POWDER, FOR SOLUTION INTRAMUSCULAR; INTRAVENOUS at 00:25

## 2023-11-15 RX ADMIN — DOCUSATE SODIUM 50 MG AND SENNOSIDES 8.6 MG 2 TABLET: 8.6; 5 TABLET, FILM COATED ORAL at 17:05

## 2023-11-15 RX ADMIN — RIVAROXABAN 10 MG: 10 TABLET, FILM COATED ORAL at 17:05

## 2023-11-15 ASSESSMENT — ENCOUNTER SYMPTOMS
FLANK PAIN: 0
SHORTNESS OF BREATH: 0
CONSTIPATION: 0
BLURRED VISION: 0
NAUSEA: 0
WEAKNESS: 1
BRUISES/BLEEDS EASILY: 1
NERVOUS/ANXIOUS: 1
SPUTUM PRODUCTION: 0
ABDOMINAL PAIN: 0
MYALGIAS: 1
SORE THROAT: 0
HEARTBURN: 0
COUGH: 1
NECK PAIN: 0
FEVER: 1
HEADACHES: 0
STRIDOR: 0
COUGH: 0
PALPITATIONS: 0
HEADACHES: 1
VOMITING: 0
DIAPHORESIS: 0
WHEEZING: 0
SINUS PAIN: 0
HEMOPTYSIS: 0
DIARRHEA: 0
CHILLS: 1
ORTHOPNEA: 0
WEIGHT LOSS: 0
BACK PAIN: 0
BLOOD IN STOOL: 0

## 2023-11-15 ASSESSMENT — PAIN DESCRIPTION - PAIN TYPE
TYPE: ACUTE PAIN

## 2023-11-15 NOTE — PROGRESS NOTES
Assumed care of patient. AO x4. With no complaints of pain. Routine assessment done. Vital signs within normal limits. Call light within reach and personal belongings within reach. Bed locked and in lowest position. Policies and procedures reinforced patient verbalized understanding.Treaded socks in place. Care plan ongoing.

## 2023-11-15 NOTE — CONSULTS
"Infectious Disease Consult Note:     Date of Service: 11/15/2023    Seen at the request of Attending: Kendell Arthur M.D.     Chief Complaint:   Headache, fevers/chills, painful urination    HPI:   39 yo male with PMH polysubstance abuse including methamphetamine, with known history of HIV spanning to at least 2015, last viral load 380,000 and 152 CD4 in 2021, has previously been on Triumeq therapy but has been on no HIV treatments or surveillance for at least 2 years. Initial exposure history endorsed to be multiple sexual partners, patient endorses marijuana and current methamphetamine use denies any IVDU in the past \"I hate needles!\"    Subjective fevers, chills, \"pounding headache\" for 2-3 days. Additionally had 1x painful urination with frequency, has milky substance noted once in otherwise normal yellow appearing urine, this did not occur again and otherwise normal appearance. No hematuria, no groin lesions, no rash. No complaint of shortness of breath. No sick contacts that patient is aware of.     Vitals in the ED notable for sinus tachycardia to 109 that resolved, vitals notable for hypoxia after arriving to floor requiring 1-2L NC, this has now resolved. Leukocytosis to 19 on admission. Received unasyn therapy for concern infectious source of sepsis - unknown etiology.    This morning thrush noted on examination by primary team, received nystatin swish and swallow, still angular cheilitis on our exam with some small amount thrush noted on side of cheek, but seems improved compared to description from primary medicine team.    Partner is at bedside and she is also HIV positive, neither of them are taking medications for their HIV. Impressed upon them strong recommendations to begin close follow for this, patient and family stated understanding.       Review of Systems:    Review of Systems   Constitutional:  Positive for chills, fever and malaise/fatigue. Negative for diaphoresis and weight loss.   HENT:  " Negative for congestion, hearing loss, nosebleeds, sinus pain and sore throat.    Eyes:  Negative for blurred vision.   Respiratory:  Positive for cough. Negative for hemoptysis, sputum production, shortness of breath, wheezing and stridor.    Cardiovascular:  Negative for chest pain, palpitations, orthopnea and leg swelling.   Gastrointestinal:  Negative for abdominal pain, blood in stool, constipation, diarrhea, heartburn, nausea and vomiting.   Genitourinary:  Positive for dysuria, frequency and urgency. Negative for flank pain and hematuria.   Musculoskeletal:  Positive for myalgias. Negative for back pain, joint pain and neck pain.   Skin:  Negative for itching and rash.   Neurological:  Positive for weakness and headaches.   Endo/Heme/Allergies:  Bruises/bleeds easily.   Psychiatric/Behavioral:  The patient is nervous/anxious.         Objective Data:   Physical Exam:   Vitals:   Temp:  [36.4 °C (97.5 °F)-37 °C (98.6 °F)] 37 °C (98.6 °F)  Pulse:  [76-80] 80  Resp:  [15-17] 15  BP: (104-105)/(61-75) 105/75  SpO2:  [92 %-98 %] 92 %    Physical Exam  Constitutional:       Comments: Thin appearance, no acute distress, laying in bed. Answers questions appropriately, cooperates with exam, appropriate affect and appropriate follow up questions - engages in the history.   HENT:      Nose: No congestion or rhinorrhea.      Mouth/Throat:      Mouth: Mucous membranes are moist.      Comments: Angular cheilitis bilaterally, left cheek with white lesion likely thrush, no pharyngeal erythema, some dental carries  Eyes:      General: No scleral icterus.     Pupils: Pupils are equal, round, and reactive to light.   Cardiovascular:      Rate and Rhythm: Normal rate and regular rhythm.      Heart sounds: No murmur heard.  Pulmonary:      Effort: Pulmonary effort is normal. No respiratory distress.      Breath sounds: Normal breath sounds. No wheezing or rales.   Abdominal:      General: Abdomen is flat. Bowel sounds are normal.  There is no distension.      Palpations: There is no mass.      Tenderness: There is no abdominal tenderness. There is no guarding.   Genitourinary:     Penis: Normal.       Testes: Normal.   Musculoskeletal:         General: No swelling or deformity. Normal range of motion.      Cervical back: Normal range of motion. No rigidity or tenderness.      Right lower leg: No edema.      Left lower leg: No edema.   Lymphadenopathy:      Cervical: No cervical adenopathy.   Skin:     General: Skin is warm and dry.      Coloration: Skin is not jaundiced.      Findings: Bruising present. No erythema, lesion or rash.   Neurological:      General: No focal deficit present.      Mental Status: He is oriented to person, place, and time.      Motor: No weakness.   Psychiatric:         Mood and Affect: Mood normal.         Behavior: Behavior normal.         Thought Content: Thought content normal.         Judgment: Judgment normal.            Labs:   Lab Results   Component Value Date/Time    WBC 12.0 (H) 11/15/2023 02:32 AM    RBC 4.72 11/15/2023 02:32 AM    HEMOGLOBIN 13.4 (L) 11/15/2023 02:32 AM    HEMATOCRIT 42.3 11/15/2023 02:32 AM    MCV 89.6 11/15/2023 02:32 AM    MCH 28.4 11/15/2023 02:32 AM    MCHC 31.7 (L) 11/15/2023 02:32 AM    MPV 10.1 11/15/2023 02:32 AM    NEUTSPOLYS 79.60 (H) 11/15/2023 02:32 AM    LYMPHOCYTES 10.90 (L) 11/15/2023 02:32 AM    MONOCYTES 8.80 11/15/2023 02:32 AM    EOSINOPHILS 0.10 11/15/2023 02:32 AM    BASOPHILS 0.20 11/15/2023 02:32 AM    ANISOCYTOSIS 1+ 05/14/2016 01:12 PM        Lab Results   Component Value Date/Time    SODIUM 135 11/15/2023 02:32 AM    POTASSIUM 4.2 11/15/2023 02:32 AM    CHLORIDE 100 11/15/2023 02:32 AM    CO2 29 11/15/2023 02:32 AM    GLUCOSE 104 (H) 11/15/2023 02:32 AM    BUN 9 11/15/2023 02:32 AM    CREATININE 0.73 11/15/2023 02:32 AM    BUNCREATRAT 11.3 12/17/2022 07:49 PM          Assessment and Plan:      #HIV  #Candidiasis  Cryptococcal infection could be contributing  given his co-morbidities, luckily his symptoms seem to have fully resolved so somewhat less likely, but will check this. Leukocytosis on admission could be STI such as gonorrhea or chlamydia given endorsed whitish discharge also somewhat less likely however, suspicion of his leukocytosis more squarely on nicanor etiology which he has clear evidence of. Agree with primary team suspicion that brief hypoxia likely not from infectious etiology such as a pneumonia. Pneumocystis for example would typically not have a leukocytosis, would have more advanced lymphopenia, more pronounced pulmonary symptoms and imaging findings. CD4 last was 150, with 300k viral load, so will require bactrim prophylaxis regardless.  -Fluconazole 10 day course  -Bactrim once daily - continue indefinitely until 6 months after CD4 remains above 200 as well as sustained undetectable viral load.  -May stop unasyn therapy  -[facility] follow up  - will consider Biktarvy vs. Triumeq or other combination antiretroviral therapy at that time - using this intermittently or incorrectly could lead to resistance, so will not be starting this at this admission given his history of noncompliance increasing his risk.  -Gonorrhea, chlamydia, RPR, cryptococcal antigen ordered. Follow up CD4 and HIV viral load - pending    John Knowles PGY-3 Internal Medicine  Patient case discussed with and patient seen with Attending Dr. Up

## 2023-11-15 NOTE — CARE PLAN
The patient is Stable - Low risk of patient condition declining or worsening    Shift Goals  Clinical Goals: Pain management  Patient Goals: Comfort  Family Goals: Get better    Progress made toward(s) clinical / shift goals:    Problem: Pain - Standard  Goal: Alleviation of pain or a reduction in pain to the patient’s comfort goal 4  Patient will state pain 4 on the scale 0-10 by the end of the shift.  Outcome: Progressing     Problem: Knowledge Deficit - Standard  Goal: Patient and family/care givers will demonstrate understanding of plan of care, disease process/condition, diagnostic tests and medications  Outcome: Progressing       Patient is not progressing towards the following goals:

## 2023-11-15 NOTE — PROGRESS NOTES
Hospital Medicine Daily Progress Note    Date of Service  11/15/2023    Chief Complaint  Sagrario Berkowitz Jr. is a 38 y.o. male admitted 11/14/2023 with cough    Hospital Course  37 yo man with HIV not taking medication, history of PE in 2016 off anticoagulation, alcohol dependence who presented with several days of fever and chills with cough.  He was found hypoxic.  Chest x-ray did not show any findings.  He had high LDH concerning for PJP.  COVID/flu/RSV screen was negative.  He was admitted for acute respiratory failure with hypoxia.  Dr. Up was consulted.  UDS was positive for amphetamine, cannabinoids, oxycodone.    Interval Problem Update  Has been weaned to room air, 92%. he says his cough and headache are doing a lot better.  WBC decreased to 12  Blood cultures and CD4 pending    I have discussed this patient's plan of care and discharge plan at IDT rounds today with Case Management, Nursing, Nursing leadership, and other members of the IDT team.    Consultants/Specialty  infectious disease    Code Status  Full Code    Disposition  The patient is not medically cleared for discharge to home or a post-acute facility.  Anticipate discharge to: home with close outpatient follow-up    I have placed the appropriate orders for post-discharge needs.    Review of Systems  Review of Systems   Constitutional:  Negative for malaise/fatigue.   Respiratory:  Negative for cough and shortness of breath.    Cardiovascular:  Negative for chest pain.   Gastrointestinal:  Negative for abdominal pain and nausea.   Genitourinary:  Positive for dysuria.   Neurological:  Negative for headaches.        Physical Exam  Temp:  [36.4 °C (97.5 °F)-37 °C (98.6 °F)] 37 °C (98.6 °F)  Pulse:  [76-80] 80  Resp:  [15-17] 15  BP: (104-105)/(61-75) 105/75  SpO2:  [92 %-98 %] 92 %    Physical Exam  Vitals and nursing note reviewed.   Constitutional:       General: He is not in acute distress.     Appearance: He is not toxic-appearing.   HENT:       Head: Normocephalic.      Mouth/Throat:      Mouth: Mucous membranes are moist.   Eyes:      General:         Right eye: No discharge.         Left eye: No discharge.   Cardiovascular:      Rate and Rhythm: Normal rate and regular rhythm.   Pulmonary:      Effort: Pulmonary effort is normal. No respiratory distress.      Breath sounds: No wheezing or rales.   Abdominal:      Palpations: Abdomen is soft.      Tenderness: There is no abdominal tenderness.   Musculoskeletal:         General: No swelling.      Cervical back: Neck supple.   Skin:     General: Skin is warm and dry.   Neurological:      Mental Status: He is alert and oriented to person, place, and time.         Fluids    Intake/Output Summary (Last 24 hours) at 11/15/2023 1548  Last data filed at 11/15/2023 1500  Gross per 24 hour   Intake 600 ml   Output 2075 ml   Net -1475 ml       Laboratory  Recent Labs     11/14/23  1138 11/15/23  0232   WBC 19.1* 12.0*   RBC 5.30 4.72   HEMOGLOBIN 15.7 13.4*   HEMATOCRIT 45.9 42.3   MCV 86.6 89.6   MCH 29.6 28.4   MCHC 34.2 31.7*   RDW 42.4 44.2   PLATELETCT 228 198   MPV 10.2 10.1     Recent Labs     11/14/23  1138 11/15/23  0232   SODIUM 131* 135   POTASSIUM 4.2 4.2   CHLORIDE 94* 100   CO2 24 29   GLUCOSE 94 104*   BUN 9 9   CREATININE 0.79 0.73   CALCIUM 9.8 9.1                   Imaging  EC-ECHOCARDIOGRAM COMPLETE W/O CONT         CT-CTA CHEST PULMONARY ARTERY W/ RECONS   Final Result      1.  No CT evidence of pulmonary emboli.            DX-CHEST-PORTABLE (1 VIEW)   Final Result      No acute cardiac or pulmonary abnormalities are identified.           Assessment/Plan  * Acute respiratory failure with hypoxia (HCC)- (present on admission)  Assessment & Plan  CTA chest negative for PE  BNP elevated, TTE pending  Wean oxygen as tolerated  Continue on Bactrim and stop Unasyn per infectious disease    Dysuria- (present on admission)  Assessment & Plan  UA with chlamydia and gonorrhea screen  "pending    Dehydration- (present on admission)  Assessment & Plan  After presentation.  Starting  mils per hour.    Uncomplicated alcohol dependence (HCC)- (present on admission)  Assessment & Plan  States that he is drinking much less than he used to.  Currently drinking 2-3 drinks a day.    Patient will need to be counseled on decreasing alcohol intake or abstaining.  Does have history of fatty infiltration on previous CT scan back in 2016.    Methamphetamine abuse (HCC)- (present on admission)  Assessment & Plan  Per history last use approximately a week ago.  Patient counseled on cessation      History of pulmonary embolism- (present on admission)  Assessment & Plan  CTA negative for PE    Monocytosis- (present on admission)  Assessment & Plan  Trend CBC    Noncompliance with medication regimen- (present on admission)  Assessment & Plan  Patient states that he has been noncompliant with his medications out of \"stupidity.\"  However, he is interested in resuming medications.  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  Repeat CD4 count pending.    HIV (human immunodeficiency virus infection) ([facility])- (present on admission)  Assessment & Plan  Infectious disease consulted, they will follow-up with him outpatient regarding ART         VTE prophylaxis:    Xarelto 10mg daily as prophylaxis      I have performed a physical exam and reviewed and updated ROS and Plan today (11/15/2023). In review of yesterday's note (11/14/2023), there are no changes except as documented above.        "

## 2023-11-15 NOTE — CARE PLAN
The patient is Stable - Low risk of patient condition declining or worsening    Shift Goals  Clinical Goals: Urinalysis, Respiratory monitoring  Patient Goals: Rest  Family Goals: Get better    Progress made toward(s) clinical / shift goals:      Problem: Pain - Standard  Goal: Alleviation of pain or a reduction in pain to the patient’s comfort goal  Outcome: Progressing   Pt has complained of a pain 9/10 in their head on admission. Pt had been medicated per MAR, and pt reports decrease to 0/10. Pt states that medication was very effective and that they are now able to get sleep that they had missed. Pt has also been implementing non-pharmacological interventions such as dimmed lights to help comfort and pain.   Problem: Knowledge Deficit - Standard  Goal: Patient and family/care givers will demonstrate understanding of plan of care, disease process/condition, diagnostic tests and medications  Outcome: Progressing     Patient is not progressing towards the following goals:

## 2023-11-16 ENCOUNTER — PATIENT OUTREACH (OUTPATIENT)
Dept: HEALTH INFORMATION MANAGEMENT | Facility: OTHER | Age: 38
End: 2023-11-16
Payer: MEDICAID

## 2023-11-16 ENCOUNTER — PHARMACY VISIT (OUTPATIENT)
Dept: PHARMACY | Facility: MEDICAL CENTER | Age: 38
End: 2023-11-16
Payer: OTHER GOVERNMENT

## 2023-11-16 VITALS
OXYGEN SATURATION: 95 % | TEMPERATURE: 97.5 F | SYSTOLIC BLOOD PRESSURE: 124 MMHG | DIASTOLIC BLOOD PRESSURE: 89 MMHG | RESPIRATION RATE: 18 BRPM | HEART RATE: 88 BPM | WEIGHT: 174 LBS | HEIGHT: 73 IN | BODY MASS INDEX: 23.06 KG/M2

## 2023-11-16 LAB
ALBUMIN SERPL BCP-MCNC: 3.2 G/DL (ref 3.2–4.9)
BASOPHILS # BLD AUTO: 0 % (ref 0–1.8)
BASOPHILS # BLD: 0 K/UL (ref 0–0.12)
BUN SERPL-MCNC: 11 MG/DL (ref 8–22)
BURR CELLS BLD QL SMEAR: NORMAL
CALCIUM ALBUM COR SERPL-MCNC: 9.6 MG/DL (ref 8.5–10.5)
CALCIUM SERPL-MCNC: 9 MG/DL (ref 8.5–10.5)
CHLORIDE SERPL-SCNC: 99 MMOL/L (ref 96–112)
CO2 SERPL-SCNC: 27 MMOL/L (ref 20–33)
CREAT SERPL-MCNC: 0.67 MG/DL (ref 0.5–1.4)
EOSINOPHIL # BLD AUTO: 0.04 K/UL (ref 0–0.51)
EOSINOPHIL NFR BLD: 0.9 % (ref 0–6.9)
ERYTHROCYTE [DISTWIDTH] IN BLOOD BY AUTOMATED COUNT: 44.1 FL (ref 35.9–50)
GFR SERPLBLD CREATININE-BSD FMLA CKD-EPI: 122 ML/MIN/1.73 M 2
GLUCOSE SERPL-MCNC: 94 MG/DL (ref 65–99)
HCT VFR BLD AUTO: 40.2 % (ref 42–52)
HGB BLD-MCNC: 13 G/DL (ref 14–18)
LYMPHOCYTES # BLD AUTO: 0.37 K/UL (ref 1–4.8)
LYMPHOCYTES NFR BLD: 8 % (ref 22–41)
MANUAL DIFF BLD: NORMAL
MCH RBC QN AUTO: 28.9 PG (ref 27–33)
MCHC RBC AUTO-ENTMCNC: 32.3 G/DL (ref 32.3–36.5)
MCV RBC AUTO: 89.3 FL (ref 81.4–97.8)
METAMYELOCYTES NFR BLD MANUAL: 0.9 %
MONOCYTES # BLD AUTO: 0.24 K/UL (ref 0–0.85)
MONOCYTES NFR BLD AUTO: 5.3 % (ref 0–13.4)
MORPHOLOGY BLD-IMP: NORMAL
NEUTROPHILS # BLD AUTO: 3.91 K/UL (ref 1.82–7.42)
NEUTROPHILS NFR BLD: 84.9 % (ref 44–72)
NRBC # BLD AUTO: 0 K/UL
NRBC BLD-RTO: 0 /100 WBC (ref 0–0.2)
PHOSPHATE SERPL-MCNC: 4 MG/DL (ref 2.5–4.5)
PLATELET # BLD AUTO: 185 K/UL (ref 164–446)
PLATELET BLD QL SMEAR: NORMAL
PMV BLD AUTO: 10.5 FL (ref 9–12.9)
POIKILOCYTOSIS BLD QL SMEAR: NORMAL
POTASSIUM SERPL-SCNC: 4.3 MMOL/L (ref 3.6–5.5)
RBC # BLD AUTO: 4.5 M/UL (ref 4.7–6.1)
RBC BLD AUTO: PRESENT
SODIUM SERPL-SCNC: 134 MMOL/L (ref 135–145)
WBC # BLD AUTO: 4.6 K/UL (ref 4.8–10.8)

## 2023-11-16 PROCEDURE — 85007 BL SMEAR W/DIFF WBC COUNT: CPT

## 2023-11-16 PROCEDURE — 700105 HCHG RX REV CODE 258: Performed by: INTERNAL MEDICINE

## 2023-11-16 PROCEDURE — 700111 HCHG RX REV CODE 636 W/ 250 OVERRIDE (IP): Mod: JZ | Performed by: INTERNAL MEDICINE

## 2023-11-16 PROCEDURE — 700105 HCHG RX REV CODE 258: Performed by: STUDENT IN AN ORGANIZED HEALTH CARE EDUCATION/TRAINING PROGRAM

## 2023-11-16 PROCEDURE — 700102 HCHG RX REV CODE 250 W/ 637 OVERRIDE(OP): Performed by: INTERNAL MEDICINE

## 2023-11-16 PROCEDURE — 80069 RENAL FUNCTION PANEL: CPT

## 2023-11-16 PROCEDURE — 700102 HCHG RX REV CODE 250 W/ 637 OVERRIDE(OP): Performed by: STUDENT IN AN ORGANIZED HEALTH CARE EDUCATION/TRAINING PROGRAM

## 2023-11-16 PROCEDURE — A9270 NON-COVERED ITEM OR SERVICE: HCPCS | Performed by: STUDENT IN AN ORGANIZED HEALTH CARE EDUCATION/TRAINING PROGRAM

## 2023-11-16 PROCEDURE — 99239 HOSP IP/OBS DSCHRG MGMT >30: CPT | Performed by: INTERNAL MEDICINE

## 2023-11-16 PROCEDURE — A9270 NON-COVERED ITEM OR SERVICE: HCPCS | Performed by: INTERNAL MEDICINE

## 2023-11-16 PROCEDURE — RXMED WILLOW AMBULATORY MEDICATION CHARGE: Performed by: INTERNAL MEDICINE

## 2023-11-16 PROCEDURE — 36415 COLL VENOUS BLD VENIPUNCTURE: CPT

## 2023-11-16 PROCEDURE — 85027 COMPLETE CBC AUTOMATED: CPT

## 2023-11-16 RX ORDER — SULFAMETHOXAZOLE AND TRIMETHOPRIM 800; 160 MG/1; MG/1
1 TABLET ORAL DAILY
Qty: 30 TABLET | Refills: 0 | Status: ACTIVE | OUTPATIENT
Start: 2023-11-17

## 2023-11-16 RX ORDER — FLUCONAZOLE 200 MG/1
200 TABLET ORAL DAILY
Qty: 7 TABLET | Refills: 0 | Status: ACTIVE | OUTPATIENT
Start: 2023-11-17 | End: 2023-11-24

## 2023-11-16 RX ADMIN — DOCUSATE SODIUM 50 MG AND SENNOSIDES 8.6 MG 2 TABLET: 8.6; 5 TABLET, FILM COATED ORAL at 05:01

## 2023-11-16 RX ADMIN — NYSTATIN 500000 UNITS: 100000 SUSPENSION ORAL at 08:11

## 2023-11-16 RX ADMIN — SULFAMETHOXAZOLE AND TRIMETHOPRIM 1 TABLET: 800; 160 TABLET ORAL at 05:00

## 2023-11-16 RX ADMIN — SODIUM CHLORIDE, POTASSIUM CHLORIDE, SODIUM LACTATE AND CALCIUM CHLORIDE: 600; 310; 30; 20 INJECTION, SOLUTION INTRAVENOUS at 03:13

## 2023-11-16 RX ADMIN — FLUCONAZOLE 200 MG: 200 TABLET ORAL at 05:00

## 2023-11-16 RX ADMIN — CEFTRIAXONE SODIUM 500 MG: 1 INJECTION, POWDER, FOR SOLUTION INTRAMUSCULAR; INTRAVENOUS at 09:40

## 2023-11-16 SDOH — ECONOMIC STABILITY: FOOD INSECURITY: WITHIN THE PAST 12 MONTHS, THE FOOD YOU BOUGHT JUST DIDN'T LAST AND YOU DIDN'T HAVE MONEY TO GET MORE.: OFTEN TRUE

## 2023-11-16 SDOH — ECONOMIC STABILITY: FOOD INSECURITY: WITHIN THE PAST 12 MONTHS, YOU WORRIED THAT YOUR FOOD WOULD RUN OUT BEFORE YOU GOT MONEY TO BUY MORE.: OFTEN TRUE

## 2023-11-16 SDOH — ECONOMIC STABILITY: INCOME INSECURITY: HOW HARD IS IT FOR YOU TO PAY FOR THE VERY BASICS LIKE FOOD, HOUSING, MEDICAL CARE, AND HEATING?: VERY HARD

## 2023-11-16 SDOH — ECONOMIC STABILITY: INCOME INSECURITY: IN THE LAST 12 MONTHS, WAS THERE A TIME WHEN YOU WERE NOT ABLE TO PAY THE MORTGAGE OR RENT ON TIME?: YES

## 2023-11-16 SDOH — ECONOMIC STABILITY: HOUSING INSECURITY
IN THE LAST 12 MONTHS, WAS THERE A TIME WHEN YOU DID NOT HAVE A STEADY PLACE TO SLEEP OR SLEPT IN A SHELTER (INCLUDING NOW)?: YES

## 2023-11-16 SDOH — ECONOMIC STABILITY: TRANSPORTATION INSECURITY
IN THE PAST 12 MONTHS, HAS LACK OF TRANSPORTATION KEPT YOU FROM MEETINGS, WORK, OR FROM GETTING THINGS NEEDED FOR DAILY LIVING?: YES

## 2023-11-16 SDOH — ECONOMIC STABILITY: HOUSING INSECURITY: IN THE LAST 12 MONTHS, HOW MANY PLACES HAVE YOU LIVED?: 1

## 2023-11-16 SDOH — ECONOMIC STABILITY: TRANSPORTATION INSECURITY
IN THE PAST 12 MONTHS, HAS THE LACK OF TRANSPORTATION KEPT YOU FROM MEDICAL APPOINTMENTS OR FROM GETTING MEDICATIONS?: YES

## 2023-11-16 NOTE — PROGRESS NOTES
OVIDIO Vora contacted pt to offer Community Care Management services as well as help set up appointment with [facility].   Housing: Pt states that he has been staying with a friend for a few months. Pt is interested in different housing options.   Transportation: Pt reports he currently takes the bus to and from his appointments. CHW provided MTM contact information.  Food: Pt states he received SNAP benefits in the past but is looking to reapply. CHW provided [employer] information.   Finances: Pt is currently receiving no income.   PCP Follow up Appointment: OVIDIO Vora spoke with [facility]. Mena states that pt is established with an infectious diease doctor at their clinic but has not been seen since 2016. Mena states that Audelia (intake ) will get in contact with pt and will assist in scheduling appointment and setting pt up with a PCP as well. CHW notified pt, pt agrees.   OVIDIO Vora provided CCM contact information and encouraged pt to call if anything else was need. CHW will contact pt at a later date to follow up on resources provided, appointment with [facility] and assist with any other needs.     Community Health Worker Intake    Identified barriers to food, housing, transportation, finances.  Resources provided to [employer], MTM, and low income housing.  Contact information provided to Sagrario Berkowitz Jr.  Has PCP appointment scheduled for: [facility] will contact pt to set up appointment.  Outpatient assessment completed.  Did the patient receive medications post discharge: Sent to pharmacy.     Plan:  OVIDIO Vora will contact pt at a later date to follow up on provided resources, [facility] appointment and assist with any other new needs.

## 2023-11-16 NOTE — DISCHARGE INSTRUCTIONS
Discharge Instructions per Kendell Arthur M.D.    Dr. Up recommends you take 7 more days of the antifungal medication fluconazole and continue on taking antibiotic Bactrim every day to prevent lung infection.  Please follow-up with Dr. Up at [facility] to start on HIV treatment.

## 2023-11-16 NOTE — PROGRESS NOTES
Patient is alert and oriented x 4. No complaint of pain, requesting nicotine patch and made the hospitalist made aware and waiting for response. IV site patent and flushing LR x 100 cc/hr. Echo EF of 65%, Chest Ct is negative  for PE. Plan to continue Bactrim abx and flucanozole x 10 days. Upself and no other concerns at this time. Godwin light and personal items within reached. Kept safe and continue monitoring.

## 2023-11-16 NOTE — CARE PLAN
Problem: Pain - Standard  Goal: Alleviation of pain or a reduction in pain to the patient’s comfort goal  Outcome: Progressing     Problem: Knowledge Deficit - Standard  Goal: Patient and family/care givers will demonstrate understanding of plan of care, disease process/condition, diagnostic tests and medications  Outcome: Progressing   The patient is Stable - Low risk of patient condition declining or worsening    Shift Goals  Clinical Goals: Chest CT, Echo  Patient Goals: comfort, advance diet  Family Goals: Get better    Progress made toward(s) clinical / shift goals:  Patient had both a chest CT and echo. No complaints of pain. Patient diet advanced to regular.     Patient is not progressing towards the following goals:

## 2023-11-16 NOTE — CARE PLAN
The patient is Stable - Low risk of patient condition declining or worsening    Shift Goals  Clinical Goals: nicotine patch, rest, oral antibiotics  Patient Goals: nicotine patch, rest  Family Goals: not present    Progress made toward(s) clinical / shift goals:    Problem: Pain - Standard  Goal: Alleviation of pain or a reduction in pain to the patient’s comfort goal  Outcome: Progressing     Problem: Knowledge Deficit - Standard  Goal: Patient and family/care givers will demonstrate understanding of plan of care, disease process/condition, diagnostic tests and medications  Outcome: Progressing     Patient is not progressing towards the following goals:     Patient is alert and oriented x 4. No complaint of pain, requesting nicotine patch and made the hospitalist made aware and waiting for response. IV site patent and flushing LR x 100 cc/hr. Echo EF of 65%, Chest Ct is negative  for PE. Plan to continue Bactrim abx and flucanozole x 10 days. Upself and no other concerns at this time. Godwin light and personal items within reached. Kept safe and continue monitoring.

## 2023-11-16 NOTE — DISCHARGE SUMMARY
"Discharge Summary    CHIEF COMPLAINT ON ADMISSION  Chief Complaint   Patient presents with    Dehydration     \"My mouth is so dry\"     Headache     Report to taking tylenol at 0000.       Reason for Admission  Headache     Admission Date  11/14/2023    CODE STATUS  Full    HPI & HOSPITAL COURSE  This is a 38 y.o. male here with untreated HIV, history of drug use, history of PE off anticoagulation who presented with cough and fever and chills.  He was found hypoxic needing oxygen.  Chest x-ray and CTA chest was negative for PE and no other findings.  COVID/flu/RSV screen was negative.  He quickly improved overnight and was weaned off oxygen.  Echocardiogram was unremarkable.  Urine drug screen was positive for amphetamine, cannabinoids, oxycodone.  Infectious disease was consulted.  Dr. Up recommended patient follow-up in clinic for treatment of his HIV, taking prophylactic Bactrim for PCP, completing 10 days of fluconazole for candidiasis.  Patient also had symptoms and tested positive for gonorrhea in his urine.  He received a dose of ceftriaxone.  Patient understands to follow-up with [facility] clinic.    Therefore, he is discharged in good and stable condition to home with close outpatient follow-up.    The patient met 2-midnight criteria for an inpatient stay at the time of discharge.    Discharge Date  11/16/2023    FOLLOW UP ITEMS POST DISCHARGE  Follow-up with Dr. Up at [facility] clinic    DISCHARGE DIAGNOSES  Principal Problem:    Acute respiratory failure with hypoxia (HCC) (POA: Yes)  Active Problems:    HIV (human immunodeficiency virus infection) (HCC) (POA: Yes)    CD4 T lymphocyte deficiency (POA: Yes)    Noncompliance with medication regimen (POA: Yes)    Monocytosis (POA: Yes)    History of pulmonary embolism (POA: Yes)    Methamphetamine abuse (HCC) (POA: Yes)    Uncomplicated alcohol dependence (HCC) (POA: Yes)    Dehydration (POA: Yes)    Dysuria (POA: Yes)  Resolved Problems:    * No resolved " hospital problems. *      FOLLOW UP  [facility]  [address] [address]  [phone]  Schedule an appointment as soon as possible for a visit in 1 week(s)        MEDICATIONS ON DISCHARGE     Medication List        START taking these medications        Instructions   fluconazole 200 MG Tabs  Start taking on: November 17, 2023  Commonly known as: Diflucan   Take 1 Tablet by mouth every day for 7 days.  Dose: 200 mg     sulfamethoxazole-trimethoprim 800-160 MG tablet  Start taking on: November 17, 2023  Commonly known as: Bactrim DS   Take 1 Tablet by mouth every day.  Dose: 1 Tablet            CONTINUE taking these medications        Instructions   acetaminophen 325 MG Tabs  Commonly known as: Tylenol   Take 650 mg by mouth one time as needed for Mild Pain or Fever. 650 mg = 2 tabs  Dose: 650 mg              Allergies  No Known Allergies    DIET  No orders of the defined types were placed in this encounter.      ACTIVITY  As tolerated.      CONSULTATIONS  Dr. Up    PROCEDURES  EC-ECHOCARDIOGRAM COMPLETE W/O CONT   Final Result      CT-CTA CHEST PULMONARY ARTERY W/ RECONS   Final Result      1.  No CT evidence of pulmonary emboli.            DX-CHEST-PORTABLE (1 VIEW)   Final Result      No acute cardiac or pulmonary abnormalities are identified.            LABORATORY  Lab Results   Component Value Date    SODIUM 134 (L) 11/16/2023    POTASSIUM 4.3 11/16/2023    CHLORIDE 99 11/16/2023    CO2 27 11/16/2023    GLUCOSE 94 11/16/2023    BUN 11 11/16/2023    CREATININE 0.67 11/16/2023        Lab Results   Component Value Date    WBC 4.6 (L) 11/16/2023    HEMOGLOBIN 13.0 (L) 11/16/2023    HEMATOCRIT 40.2 (L) 11/16/2023    PLATELETCT 185 11/16/2023        Total time of the discharge process exceeds 32 minutes.

## 2023-11-16 NOTE — DISCHARGE PLANNING
Case Management Discharge Planning    Admission Date: 11/14/2023  GMLOS: 3.6  ALOS: 2    6-Clicks ADL Score: 24  6-Clicks Mobility Score: 24      Anticipated Discharge Dispo: Discharge Disposition: Discharged to home/self care (01)    DME Needed: No    Action(s) Taken: RN CM met with pt at the bedside to complete assessment. Pt states he lives with SO in an apartment.  Pt independent with ADLs. No DME. Pt reports being unemployed at this time. Pt reports no PCP.  RN YOBANI reached out to community health worker to assist pt on calling [facility] for HIV follow up.  Pt notified to follow up as well.    Escalations Completed: None    Medically Clear: Yes    Next Steps: RNYOBANI to continue to follow up with pt and medical team to address dc needs and barriers      Barriers to Discharge: None

## 2023-11-17 ENCOUNTER — TELEPHONE (OUTPATIENT)
Dept: HEALTH INFORMATION MANAGEMENT | Facility: OTHER | Age: 38
End: 2023-11-17

## 2023-11-18 LAB — T PALLIDUM AB SER QL AGGL: REACTIVE

## 2023-11-22 ENCOUNTER — PATIENT OUTREACH (OUTPATIENT)
Dept: HEALTH INFORMATION MANAGEMENT | Facility: OTHER | Age: 38
End: 2023-11-22
Payer: MEDICAID

## 2023-11-23 NOTE — PROGRESS NOTES
CHW Vielka attempted to contact pt to follow up on resources provided. CHW was unable to reach pt. VM box is not set up. CHW will attempt to call back at a later date.

## 2023-12-07 NOTE — PROGRESS NOTES
CHW Vielka attempted to contact pt to follow up on resources provided on 11/16/2023. CHW was unable to reach pt. CHW was unable to leave  due to VM box not being set up. CCM contact information was provided during initial assessment. Due to CHW being unable to reach pt, CHW will not continue to follow at this time.

## 2024-01-30 ENCOUNTER — HOSPITAL ENCOUNTER (EMERGENCY)
Facility: MEDICAL CENTER | Age: 39
End: 2024-01-30
Attending: STUDENT IN AN ORGANIZED HEALTH CARE EDUCATION/TRAINING PROGRAM
Payer: MEDICAID

## 2024-01-30 VITALS
TEMPERATURE: 98.5 F | BODY MASS INDEX: 23.35 KG/M2 | HEART RATE: 109 BPM | RESPIRATION RATE: 19 BRPM | DIASTOLIC BLOOD PRESSURE: 86 MMHG | SYSTOLIC BLOOD PRESSURE: 134 MMHG | HEIGHT: 73 IN | WEIGHT: 176.15 LBS | OXYGEN SATURATION: 97 %

## 2024-01-30 DIAGNOSIS — L02.91 ABSCESS: ICD-10-CM

## 2024-01-30 PROCEDURE — 700101 HCHG RX REV CODE 250: Mod: UD | Performed by: STUDENT IN AN ORGANIZED HEALTH CARE EDUCATION/TRAINING PROGRAM

## 2024-01-30 PROCEDURE — 99282 EMERGENCY DEPT VISIT SF MDM: CPT | Mod: 25

## 2024-01-30 PROCEDURE — 303977 HCHG I & D

## 2024-01-30 RX ORDER — SULFAMETHOXAZOLE AND TRIMETHOPRIM 800; 160 MG/1; MG/1
1 TABLET ORAL 2 TIMES DAILY
Qty: 10 TABLET | Refills: 0 | Status: ACTIVE | OUTPATIENT
Start: 2024-01-30 | End: 2024-02-04

## 2024-01-30 RX ADMIN — LIDOCAINE HYDROCHLORIDE 20 ML: 10; .005 INJECTION, SOLUTION EPIDURAL; INFILTRATION; INTRACAUDAL; PERINEURAL at 19:59

## 2024-01-30 ASSESSMENT — FIBROSIS 4 INDEX: FIB4 SCORE: 1.69

## 2024-01-30 ASSESSMENT — PAIN DESCRIPTION - PAIN TYPE: TYPE: ACUTE PAIN
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
## 2024-01-31 NOTE — ED NOTES
Wound care on pt R armpit provided, and gauze applied as a dressing. Pt also provided wound care supplies for discharged.  Pt provided discharge instructions, and prescription. Pt verbalized understanding of all instructions. Pt ambulatory to lobby w/ steady gait.    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    Fever of unknown origin.   - CXR without consolidations or effusions; U/A unremarkable; GI PCR Neg   - Cultures remain negative to date   - LE VA duplex --> + For DVT;  V/Q scan -- Low probability   - Was on empiric treatment with Vanc/cefepime --> ID consulted; S/P Zosyn  - Hold home penicillin while on ABX   - ID to follow up, infectious work up as per ID  --> CMV PCR -- + C/w Ganciclovir per ID , CMV IGG (+) and IgM (-), Cryptococcal Ag --Neg, Quantiferon Tb -- Neg, Fungitell -- <31   - CT Chest non-contrast, noted, no acute pathology  - Shock, RRT, resume antibiotics IV fluid, MICU eval , ID follow up  --> Prednisone 50 PO Qd   - S/P Zosyn; Defer ABX as per ID   - Short course steroid per hematology -- On Prednisone 50 Qd   - IR eval for BM BX as per Heme/Onc --> done on 04/06 with IR, f/u path -- Pending   - ID ordered pan CT, noted questionable cystitis. UA is neg.  - Febrile 04/10 and 04/11 AM. 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 Repeat CT head     Anemia, Thrombocytopenia, Pancytopenia  - Drop in Hgb, Occult +  - Hold Hep gtt ; S/P 1 unit of PRBCs 03/28  - Maintain active T+S. Transfuse for Hgb < 7.0, and platelets < 10.K  - GI eval appreciated; F/u recs --> No plans for scope at this time   - Was on Xarelto, held due to drop in Platelets as per Heme/Onc. Monitor platelet count   - S/P 1 unit PRBCs and 1 unit Platelets 04/04, 04/05   - HIT ab: neg  - ASA on hold  - Plt of 7K, plan for 1 unit of PRBCs today     Afib  - Unable to be on AC 2/2 low platelets  - On Digoxin   - Monitor on tele   - Cardio follow up     HypoNa  - Cont to monitor and trend  - Appreciate renal eval.   - Serial labs     Multiple myeloma.   - pancytopenia largely at baseline although Hg 6.7 on arrival; s/p 1U PRBC with good response   - Was on acyclovir, now on Ganciclovir as per ID   - C/w home entecavir   - S/P dexamethasone, D/C'd by hematology, follow up outpatient after discharge  --> Now on Prednisone 50 Qd   - Pt reports his Revlimid was held  - Heme/Onc consulted; F/u recs   - Resume home aspirin. --> Now on hold   - s/p IR BM Bx  04/06; F/u results      Chronic diarrhea, now with Constipation   - Standing Imodium switched to PRN  - Monitor for BM   - no BM, discussed with GI, planned for enema    DVT   - Duplex + For R soleal vein DVT  --> Will consider CT A chest once creatinine permits as patient received contrast; Monitor for LOPEZ   - s/p Hep gtt; Monitor PTT; Monitor H/H closely --> Now on hold in view of drop in Hgb and severe thrombocytopenia  - VQ scan to R/O PE -- Low probability   - Vascular eval appreciated; AC as tolerated, on Xarelto 10, monitor H/H, plts too low to start.   - Serial Duplex to assess for propagation  -- Without propagation   - 04/03 Duplex without propagation   - Repeat Duplex 04/10 -- without propogation   - given severe thrombocytopenia, holding Xarelto for now. Risk of bleeding greater than benefit.     RK  - S/P Contrast on 03/23   - Monitor Cr closely; Avoid nephrotoxic agents   - Cr down-trending. s/p IVF   - Renal eval appreciated   - urinary retention s/p lloyd.   - Removed lloyd 4/7/23, TOV in progress. post void residual 550ml on bladder scan  - s/p straight cath. may need to replace lloyd if unable to urinarte.   - CT with hydronephrosis; Cr up-trending, discussed with renal -- S/P IVF  CC X 24 hours and re-assess Cr in AM, Lloyd replaced. Flomax started; F/u renal recs      Hypertension, now hypotensive   - C/w home metoprolol.  - C/w Midodrine 10 TID. Hold for SBP > 140     Hiccups  - Improved on Reglan, Monitor     Hyperlipidemia.   - C/w home atorvastatin.    Abnormal CT  - Outpatient follow up for CT findings    Prophylactic measure.   dvt ppx: DVT PPX   diet: regular  ambulate: with assistance    fall precautions  aspiration precautions.      Discussed with Patient, and family in detail at the bedside. Discussed with Attending, and ACPs.      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    Fever of unknown origin.   - CXR without consolidations or effusions; U/A unremarkable; GI PCR Neg   - Cultures remain negative to date   - LE VA duplex --> + For DVT;  V/Q scan -- Low probability   - Was on empiric treatment with Vanc/cefepime --> ID consulted; S/P Zosyn  - Hold home penicillin while on ABX   - ID to follow up, infectious work up as per ID  --> CMV PCR -- + C/w Ganciclovir per ID , CMV IGG (+) and IgM (-), Cryptococcal Ag --Neg, Quantiferon Tb -- Neg, Fungitell -- <31   - CT Chest non-contrast, noted, no acute pathology  - Shock, RRT, resume antibiotics IV fluid, MICU eval , ID follow up  --> Prednisone 50 PO Qd   - S/P Zosyn; Defer ABX as per ID   - Short course steroid per hematology -- On Prednisone 50 Qd   - IR eval for BM BX as per Heme/Onc --> done on 04/06 with IR, f/u path -- Pending   - ID ordered pan CT, noted questionable cystitis. UA is neg.  - Febrile 04/10 and 04/11 AM. 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 Repeat CT head     Anemia, Thrombocytopenia, Pancytopenia  - Drop in Hgb, Occult +  - Hold Hep gtt ; S/P 1 unit of PRBCs 03/28  - Maintain active T+S. Transfuse for Hgb < 7.0, and platelets < 10.K  - GI eval appreciated; F/u recs --> No plans for scope at this time   - Was on Xarelto, held due to drop in Platelets as per Heme/Onc. Monitor platelet count   - S/P 1 unit PRBCs and 1 unit Platelets 04/04, 04/05   - HIT ab: neg  - ASA on hold  - Plt of 7K, plan for 1 unit of PRBCs today     Afib  - Unable to be on AC 2/2 low platelets  - On Digoxin   - Monitor on tele   - Cardio follow up     HypoNa  - Cont to monitor and trend  - Appreciate renal eval.   - Serial labs     Multiple myeloma.   - pancytopenia largely at baseline although Hg 6.7 on arrival; s/p 1U PRBC with good response   - Was on acyclovir, now on Ganciclovir as per ID   - C/w home entecavir   - S/P dexamethasone, D/C'd by hematology, follow up outpatient after discharge  --> Now on Prednisone 50 Qd   - Pt reports his Revlimid was held  - Heme/Onc consulted; F/u recs   - Resume home aspirin. --> Now on hold   - s/p IR BM Bx  04/06; F/u results      Chronic diarrhea, now with Constipation   - Standing Imodium switched to PRN  - Monitor for BM   - no BM, discussed with GI, planned for enema    DVT   - Duplex + For R soleal vein DVT  --> Will consider CT A chest once creatinine permits as patient received contrast; Monitor for LOPEZ   - s/p Hep gtt; Monitor PTT; Monitor H/H closely --> Now on hold in view of drop in Hgb and severe thrombocytopenia  - VQ scan to R/O PE -- Low probability   - Vascular eval appreciated; AC as tolerated, on Xarelto 10, monitor H/H, plts too low to start.   - Serial Duplex to assess for propagation  -- Without propagation   - 04/03 Duplex without propagation   - Repeat Duplex 04/10 -- without propogation   - given severe thrombocytopenia, holding Xarelto for now. Risk of bleeding greater than benefit.     RK  - S/P Contrast on 03/23   - Monitor Cr closely; Avoid nephrotoxic agents   - Cr down-trending. s/p IVF   - Renal eval appreciated   - urinary retention s/p lloyd.   - Removed lloyd 4/7/23, TOV in progress. post void residual 550ml on bladder scan  - s/p straight cath. may need to replace lloyd if unable to urinarte.   - CT with hydronephrosis; Cr up-trending, discussed with renal -- S/P IVF  CC X 24 hours and re-assess Cr in AM, Lloyd replaced. Flomax started; F/u renal recs      Hypertension, now hypotensive   - C/w home metoprolol.  - C/w Midodrine 10 TID. Hold for SBP > 140     Hiccups  - Improved on Reglan, Monitor     Hyperlipidemia.   - C/w home atorvastatin.    Abnormal CT  - Outpatient follow up for CT findings    Prophylactic measure.   dvt ppx: DVT PPX   diet: regular  ambulate: with assistance    fall precautions  aspiration precautions.      Discussed with Patient, and family in detail at the bedside. Discussed with Attending, and ACPs.      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    Fever of unknown origin.   - CXR without consolidations or effusions; U/A unremarkable; GI PCR Neg   - Cultures remain negative to date   - LE VA duplex --> + For DVT;  V/Q scan -- Low probability   - Was on empiric treatment with Vanc/cefepime --> ID consulted; S/P Zosyn  - Hold home penicillin while on ABX   - ID to follow up, infectious work up as per ID  --> CMV PCR -- + C/w Ganciclovir per ID , CMV IGG (+) and IgM (-), Cryptococcal Ag --Neg, Quantiferon Tb -- Neg, Fungitell -- <31   - CT Chest non-contrast, noted, no acute pathology  - Shock, RRT, resume antibiotics IV fluid, MICU eval , ID follow up  --> Prednisone 50 PO Qd   - S/P Zosyn; Defer ABX as per ID   - Short course steroid per hematology -- On Prednisone 50 Qd   - IR eval for BM BX as per Heme/Onc --> done on 04/06 with IR, f/u path -- Pending   - ID ordered pan CT, noted questionable cystitis. UA is neg.  - Febrile 04/10 and 04/11 AM. 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 Transfuse for Hgb < 7.0, and platelets < 10.K  - GI eval appreciated; F/u recs --> No plans for scope at this time   - Was on Xarelto, held due to drop in Platelets as per Heme/Onc. Monitor platelet count   - S/P 1 unit PRBCs and 1 unit Platelets 04/04, 04/05   - HIT ab: neg  - ASA on hold  - Plt of 7K, plan for 1 unit of Plt today   - Hgb of 6.7 , 1 unit PRBC     Afib  - Unable to be on AC 2/2 low platelets  - On Digoxin   - Monitor on tele   - Cardio follow up     HypoNa  - Cont to monitor and trend  - Appreciate renal eval.   - Serial labs     Multiple myeloma.   - pancytopenia largely at baseline although Hg 6.7 on arrival; s/p 1U PRBC with good response   - Was on acyclovir, now on Ganciclovir as per ID   - C/w home entecavir   - S/P dexamethasone, D/C'd by hematology, follow up outpatient after discharge  --> Now on Prednisone 50 Qd   - Pt reports his Revlimid was held  - Heme/Onc consulted; F/u recs   - Resume home aspirin. --> Now on hold   - s/p IR BM Bx  04/06; F/u results      Chronic diarrhea, now with Constipation   - Standing Imodium switched to PRN  - Monitor for BM   - no BM, discussed with GI, planned for enema    DVT   - Duplex + For R soleal vein DVT  --> Will consider CT A chest once creatinine permits as patient received contrast; Monitor for LOPEZ   - s/p Hep gtt; Monitor PTT; Monitor H/H closely --> Now on hold in view of drop in Hgb and severe thrombocytopenia  - VQ scan to R/O PE -- Low probability   - Vascular eval appreciated; AC as tolerated, on Xarelto 10, monitor H/H, plts too low to start.   - Serial Duplex to assess for propagation  -- Without propagation   - 04/03 Duplex without propagation   - Repeat Duplex 04/10 -- without propogation   - given severe thrombocytopenia, holding Xarelto for now. Risk of bleeding greater than benefit.     RK  - S/P Contrast on 03/23   - Monitor Cr closely; Avoid nephrotoxic agents   - Cr down-trending. s/p IVF   - Renal eval appreciated   - urinary retention s/p lloyd.   - Removed lloyd 4/7/23, TOV in progress. post void residual 550ml on bladder scan  - s/p straight cath. may need to replace lloyd if unable to urinarte.   - CT with hydronephrosis; Cr up-trending, discussed with renal - Hydration as tolerated,  Flomax started; F/u renal recs      Hypertension, now hypotensive   - C/w home metoprolol.  - C/w Midodrine 10 TID. Hold for SBP > 140     Hiccups  - Improved on Reglan, Monitor     Hyperlipidemia.   - C/w home atorvastatin.    Abnormal CT  - Outpatient follow up for CT findings    Prophylactic measure.   dvt ppx: DVT PPX   diet: regular  ambulate: with assistance    fall precautions  aspiration precautions.      Discussed with Patient, and family in detail at the bedside. Discussed with Attending, and ACPs.

## 2024-05-14 ENCOUNTER — HOSPITAL ENCOUNTER (EMERGENCY)
Facility: MEDICAL CENTER | Age: 39
End: 2024-05-15
Attending: STUDENT IN AN ORGANIZED HEALTH CARE EDUCATION/TRAINING PROGRAM

## 2024-05-14 ENCOUNTER — APPOINTMENT (OUTPATIENT)
Dept: RADIOLOGY | Facility: MEDICAL CENTER | Age: 39
End: 2024-05-14
Attending: STUDENT IN AN ORGANIZED HEALTH CARE EDUCATION/TRAINING PROGRAM

## 2024-05-14 VITALS
HEART RATE: 98 BPM | RESPIRATION RATE: 14 BRPM | TEMPERATURE: 98.7 F | DIASTOLIC BLOOD PRESSURE: 72 MMHG | WEIGHT: 171.08 LBS | OXYGEN SATURATION: 91 % | SYSTOLIC BLOOD PRESSURE: 128 MMHG | HEIGHT: 73 IN | BODY MASS INDEX: 22.67 KG/M2

## 2024-05-14 DIAGNOSIS — J02.9 PHARYNGITIS, UNSPECIFIED ETIOLOGY: ICD-10-CM

## 2024-05-14 DIAGNOSIS — B20 CURRENTLY ASYMPTOMATIC HIV INFECTION, WITH HISTORY OF HIV-RELATED ILLNESS (HCC): ICD-10-CM

## 2024-05-14 LAB
ALBUMIN SERPL BCP-MCNC: 3.8 G/DL (ref 3.2–4.9)
ALBUMIN/GLOB SERPL: 1 G/DL
ALP SERPL-CCNC: 84 U/L (ref 30–99)
ALT SERPL-CCNC: 11 U/L (ref 2–50)
ANION GAP SERPL CALC-SCNC: 10 MMOL/L (ref 7–16)
AST SERPL-CCNC: 29 U/L (ref 12–45)
BASOPHILS # BLD AUTO: 0.2 % (ref 0–1.8)
BASOPHILS # BLD: 0.01 K/UL (ref 0–0.12)
BILIRUB SERPL-MCNC: 0.3 MG/DL (ref 0.1–1.5)
BUN SERPL-MCNC: 7 MG/DL (ref 8–22)
CALCIUM ALBUM COR SERPL-MCNC: 8.7 MG/DL (ref 8.5–10.5)
CALCIUM SERPL-MCNC: 8.5 MG/DL (ref 8.5–10.5)
CHLORIDE SERPL-SCNC: 100 MMOL/L (ref 96–112)
CO2 SERPL-SCNC: 26 MMOL/L (ref 20–33)
CREAT SERPL-MCNC: 0.9 MG/DL (ref 0.5–1.4)
EOSINOPHIL # BLD AUTO: 0.02 K/UL (ref 0–0.51)
EOSINOPHIL NFR BLD: 0.3 % (ref 0–6.9)
ERYTHROCYTE [DISTWIDTH] IN BLOOD BY AUTOMATED COUNT: 41.4 FL (ref 35.9–50)
FLUAV RNA SPEC QL NAA+PROBE: NEGATIVE
FLUBV RNA SPEC QL NAA+PROBE: NEGATIVE
GFR SERPLBLD CREATININE-BSD FMLA CKD-EPI: 111 ML/MIN/1.73 M 2
GLOBULIN SER CALC-MCNC: 3.9 G/DL (ref 1.9–3.5)
GLUCOSE SERPL-MCNC: 98 MG/DL (ref 65–99)
HCT VFR BLD AUTO: 40.9 % (ref 42–52)
HGB BLD-MCNC: 14.2 G/DL (ref 14–18)
IMM GRANULOCYTES # BLD AUTO: 0.02 K/UL (ref 0–0.11)
IMM GRANULOCYTES NFR BLD AUTO: 0.3 % (ref 0–0.9)
LYMPHOCYTES # BLD AUTO: 0.69 K/UL (ref 1–4.8)
LYMPHOCYTES NFR BLD: 10.5 % (ref 22–41)
MCH RBC QN AUTO: 30 PG (ref 27–33)
MCHC RBC AUTO-ENTMCNC: 34.7 G/DL (ref 32.3–36.5)
MCV RBC AUTO: 86.5 FL (ref 81.4–97.8)
MONOCYTES # BLD AUTO: 0.61 K/UL (ref 0–0.85)
MONOCYTES NFR BLD AUTO: 9.3 % (ref 0–13.4)
NEUTROPHILS # BLD AUTO: 5.22 K/UL (ref 1.82–7.42)
NEUTROPHILS NFR BLD: 79.4 % (ref 44–72)
NRBC # BLD AUTO: 0 K/UL
NRBC BLD-RTO: 0 /100 WBC (ref 0–0.2)
PLATELET # BLD AUTO: 154 K/UL (ref 164–446)
PMV BLD AUTO: 10.2 FL (ref 9–12.9)
POTASSIUM SERPL-SCNC: 4 MMOL/L (ref 3.6–5.5)
PROT SERPL-MCNC: 7.7 G/DL (ref 6–8.2)
RBC # BLD AUTO: 4.73 M/UL (ref 4.7–6.1)
RSV RNA SPEC QL NAA+PROBE: NEGATIVE
SARS-COV-2 RNA RESP QL NAA+PROBE: NOTDETECTED
SODIUM SERPL-SCNC: 136 MMOL/L (ref 135–145)
WBC # BLD AUTO: 6.6 K/UL (ref 4.8–10.8)

## 2024-05-14 RX ORDER — SODIUM CHLORIDE, SODIUM LACTATE, POTASSIUM CHLORIDE, CALCIUM CHLORIDE 600; 310; 30; 20 MG/100ML; MG/100ML; MG/100ML; MG/100ML
1000 INJECTION, SOLUTION INTRAVENOUS ONCE
Status: COMPLETED | OUTPATIENT
Start: 2024-05-14 | End: 2024-05-14

## 2024-05-14 RX ADMIN — SODIUM CHLORIDE, POTASSIUM CHLORIDE, SODIUM LACTATE AND CALCIUM CHLORIDE 1000 ML: 600; 310; 30; 20 INJECTION, SOLUTION INTRAVENOUS at 22:25

## 2024-05-14 RX ADMIN — LIDOCAINE HYDROCHLORIDE 30 ML: 20 SOLUTION ORAL; TOPICAL at 22:22

## 2024-05-14 RX ADMIN — IOHEXOL 80 ML: 350 INJECTION, SOLUTION INTRAVENOUS at 22:35

## 2024-05-14 ASSESSMENT — FIBROSIS 4 INDEX: FIB4 SCORE: 1.69

## 2024-05-15 LAB — S PYO DNA SPEC NAA+PROBE: NOT DETECTED

## 2024-05-15 RX ORDER — BICTEGRAVIR SODIUM, EMTRICITABINE, AND TENOFOVIR ALAFENAMIDE FUMARATE 50; 200; 25 MG/1; MG/1; MG/1
1 TABLET ORAL DAILY
Qty: 30 TABLET | Refills: 0 | Status: ACTIVE | OUTPATIENT
Start: 2024-05-15

## 2024-05-15 RX ORDER — SULFAMETHOXAZOLE AND TRIMETHOPRIM 800; 160 MG/1; MG/1
1 TABLET ORAL DAILY
Qty: 30 TABLET | Refills: 0 | Status: ACTIVE | OUTPATIENT
Start: 2024-05-15 | End: 2024-06-14

## 2024-05-15 NOTE — ED PROVIDER NOTES
"ED Provider Note    CHIEF COMPLAINT  Chief Complaint   Patient presents with    Sore Throat     Sore throat, painful swallowing started approx x3 days ago        EXTERNAL RECORDS REVIEWED  Reviewed inpatient admission discharge summary 11/16/2023, admitted to the hospital for hypoxia, history of pulmonary embolism not on anticoagulation  At that time was taking prophylactic Bactrim    Last CBC in our system, white blood cell count 4684% neutrophils, 8% lymphocytes  CD4 count was 61 at that time    HPI/ROS  LIMITATION TO HISTORY   Select: : None  OUTSIDE HISTORIAN(S):  Girlfriend providing clinically relevant collateral history    Sagrario Berkowitz Jr. is a 38 y.o. male with past medical history of HIV presenting to the emergency department for sore throat.  Symptoms started 3 days ago, no known sick contacts.  Does not know his CD4 count.  Noncompliant with antiretrovirals.  Endorses associated low-grade fevers chills.  Has pain when he swallows.  Also complaining of mild submandibular pain.    PAST MEDICAL HISTORY   has a past medical history of HIV (human immunodeficiency virus infection) (HCC), Infectious disease, and Pulmonary embolism (HCC).    SURGICAL HISTORY   has a past surgical history that includes dental surgery procedure (10/30/2021).    FAMILY HISTORY  Family History   Problem Relation Age of Onset    Thyroid Mother     Hypertension Mother     Hypertension Father        SOCIAL HISTORY  Social History     Tobacco Use    Smoking status: Every Day     Current packs/day: 0.50     Types: Cigarettes    Smokeless tobacco: Former    Tobacco comments:     ppd   Vaping Use    Vaping status: Former   Substance and Sexual Activity    Alcohol use: Yes     Comment: \"3-4 times a week\"    Drug use: Yes     Types: Inhaled     Comment: daily marijauna    Sexual activity: Not Currently     Partners: Female       CURRENT MEDICATIONS  Home Medications       Reviewed by Karyna Bridges R.N. (Registered Nurse) on 05/14/24 " "at 2136  Med List Status: Partial     Medication Last Dose Status   acetaminophen (TYLENOL) 325 MG Tab  Active   sulfamethoxazole-trimethoprim (BACTRIM DS) 800-160 MG tablet  Active                    ALLERGIES  No Known Allergies    PHYSICAL EXAM  VITAL SIGNS: /72   Pulse 98   Temp 37.1 °C (98.7 °F) (Temporal)   Resp 14   Ht 1.854 m (6' 1\")   Wt 77.6 kg (171 lb 1.2 oz)   SpO2 91%   BMI 22.57 kg/m²    General: non-toxic, no acute distress  Neuro: oriented x 3, moving all extremities.   HEENT:   - Head: Normocephalic, atraumatic  - Eyes: PERRL  - Ears/Nose: normal external nose and ears  - Mouth: Dry mucosal membranes, erythema to the posterior oropharynx, no asymmetry, uvula midline, no obvious plaques, exudate to the tonsils or posterior oropharynx.  He has no trismus.  No sublingual edema.  Neck: Mild submandibular adenopathy.  Resp: clear to auscultation, no increased work of breathing  CV: Regular rate and rhythm  Abd: Soft, non-tender, non-distended  Extremities: No peripheral edema  Psych: lucid and conversational       DIAGNOSTIC STUDIES / PROCEDURES    EKG  My independent EKG interpretation:  Results for orders placed or performed during the hospital encounter of 10/26/21   EKG (NOW)   Result Value Ref Range    Report       [facility]    Test Date:  2021-10-26  Pt Name:    EARL MCDONNELL                Department: ER  MRN:        [id number]                      Room:        03  Gender:     Male                         Technician: [id number]  :        1985                   Requested By:RONNY PORRAS  Order #:    [id number]                    Reading MD:    Measurements  Intervals                                Axis  Rate:       96                           P:          81  FL:         140                          QRS:        87  QRSD:       92                           T:          50  QT:         364  QTc:        460    Interpretive Statements  SINUS " RHYTHM  RIGHT ATRIAL ABNORMALITY  LATE PRECORDIAL R/S TRANSITION  CONSIDER LEFT VENTRICULAR HYPERTROPHY  Compared to ECG 01/04/2018 18:45:06  ST (T wave) deviation no longer present         LABS  Results for orders placed or performed during the hospital encounter of 05/14/24   CBC WITH DIFFERENTIAL   Result Value Ref Range    WBC 6.6 4.8 - 10.8 K/uL    RBC 4.73 4.70 - 6.10 M/uL    Hemoglobin 14.2 14.0 - 18.0 g/dL    Hematocrit 40.9 (L) 42.0 - 52.0 %    MCV 86.5 81.4 - 97.8 fL    MCH 30.0 27.0 - 33.0 pg    MCHC 34.7 32.3 - 36.5 g/dL    RDW 41.4 35.9 - 50.0 fL    Platelet Count 154 (L) 164 - 446 K/uL    MPV 10.2 9.0 - 12.9 fL    Neutrophils-Polys 79.40 (H) 44.00 - 72.00 %    Lymphocytes 10.50 (L) 22.00 - 41.00 %    Monocytes 9.30 0.00 - 13.40 %    Eosinophils 0.30 0.00 - 6.90 %    Basophils 0.20 0.00 - 1.80 %    Immature Granulocytes 0.30 0.00 - 0.90 %    Nucleated RBC 0.00 0.00 - 0.20 /100 WBC    Neutrophils (Absolute) 5.22 1.82 - 7.42 K/uL    Lymphs (Absolute) 0.69 (L) 1.00 - 4.80 K/uL    Monos (Absolute) 0.61 0.00 - 0.85 K/uL    Eos (Absolute) 0.02 0.00 - 0.51 K/uL    Baso (Absolute) 0.01 0.00 - 0.12 K/uL    Immature Granulocytes (abs) 0.02 0.00 - 0.11 K/uL    NRBC (Absolute) 0.00 K/uL   COMP METABOLIC PANEL   Result Value Ref Range    Sodium 136 135 - 145 mmol/L    Potassium 4.0 3.6 - 5.5 mmol/L    Chloride 100 96 - 112 mmol/L    Co2 26 20 - 33 mmol/L    Anion Gap 10.0 7.0 - 16.0    Glucose 98 65 - 99 mg/dL    Bun 7 (L) 8 - 22 mg/dL    Creatinine 0.90 0.50 - 1.40 mg/dL    Calcium 8.5 8.5 - 10.5 mg/dL    Correct Calcium 8.7 8.5 - 10.5 mg/dL    AST(SGOT) 29 12 - 45 U/L    ALT(SGPT) 11 2 - 50 U/L    Alkaline Phosphatase 84 30 - 99 U/L    Total Bilirubin 0.3 0.1 - 1.5 mg/dL    Albumin 3.8 3.2 - 4.9 g/dL    Total Protein 7.7 6.0 - 8.2 g/dL    Globulin 3.9 (H) 1.9 - 3.5 g/dL    A-G Ratio 1.0 g/dL   ESTIMATED GFR   Result Value Ref Range    GFR (CKD-EPI) 111 >60 mL/min/1.73 m 2   Group A Strep by PCR    Specimen: Throat    Result Value Ref Range    Group A Strep by PCR Not Detected Not Detected   POC CoV-2, FLU A/B, RSV by PCR   Result Value Ref Range    POC Influenza A RNA, PCR Negative Negative    POC Influenza B RNA, PCR Negative Negative    POC RSV, by PCR Negative Negative    POC SARS-CoV-2, PCR NotDetected        RADIOLOGY  I have independently interpreted the diagnostic imaging associated with this visit and am waiting the final reading from the radiologist.   My preliminary interpretation is as follows:   - Reviewed CT scan of the  neck which shows no obvious fluid collection, asymmetry.  Radiologist interpretation:   CT-SOFT TISSUE NECK WITH   Final Result      1.  Multiple dental caries and periapical lucencies. No drainable soft tissue abscess.   2.  Left greater than right maxillary sinusitis. This may be odontogenic.   3.  Calcification of the right optic nerve sheath, nonspecific.   4.  Possible glossitis. Correlate with exam.              MEDICAL DECISION MAKING      ED COURSE AND PLAN    Sagrario Berkowitz is a 38 y.o. male presenting to the emergency department for a sore throat.  Tachycardic on arrival to the emergency department.  Patient has a history of HIV, noncompliant with antiretrovirals.  Last CD4 was 61 back in November 2023, has not been on any antiretroviral since then.  Not taking prophylactic antibodies.  On exam of posterior oropharynx he has erythema but no obvious plaques or exudate.   Considered broad differential including CMV esophagitis, Candida esophagitis, RPA, PTA, Damion's, epiglottitis among others.  Will plan to obtain labs, updated CD4 count, administer IV fluids and obtain a CT scan of the neck.      ---Pertinent ED Course---:    10:22 PM I reviewed the patient's old records in Epic, medication list, allergies, past medical history and performed a physical examination.     12:07 AM reviewed labs.  CBC with normal white blood cell, slightly decreased lymphocyte count at 10.5%.  Chemistry is  unremarkable.  COVID flu RSV testing is negative.  CD4 is still pending.  I reviewed the CT of the neck which shows no obvious fluid collection, inflammation of the esophagus.  Does show possible glossitis, on exam, patient has no obvious candidal appearance to the back of the oropharynx or the tongue.  Patient's heart rate normalized after treatment with IV fluids, symptoms improved after GI cocktail.         Due to epic downtime, will review hardcopy of the CT soft tissue neck and dispo patient accordingly.  If CT is normal, patient will likely be appropriate for discharge home    1230 CT scan shows mild glossitis but no fluid collection, no actionable abnormalities in the posterior oropharynx.  He does have poor dentition.  I considered antibiotics, there is no evidence of an acute infection, relatively low suspicion for esophagitis given clinical exam and supporting imaging and labs.    I represcribed his Bactrim and I prescribed him a 30-day course of Biktarvy    Patient is appropriate for discharge home, return precautions discussed.  Advised to follow-up with infectious disease for his HIV    Procedures:      ----------------------------------------------------------------------------------  DISCUSSIONS    I have discussed management of the patient with the following physicians and PAUL's:      Discussion of management with other QHP or appropriate source(s):     Escalation of care considered, and ultimately not performed:    Barriers to care at this time, including but not limited to: Medication noncompliance, methamphetamine use disorder, history of HIV    Decision tools and prescription drugs considered including, but not limited to: Represcribed Bactrim, prescribed Biktarvy for HIV.    FINAL IMPRESSION    1. Pharyngitis, unspecified etiology    2. Currently asymptomatic HIV infection, with history of HIV-related illness (HCC)        Discharge Medication List as of 5/15/2024  1:12 AM        START taking  these medications    Details   bictegravir-emtricitab-TAF (BIKTARVY) -25 mg Tab tablet Take 1 Tablet by mouth every day., Disp-30 Tablet, R-0, Normal               DISPOSITION          This chart was dictated using an electronic voice recognition software. The chart has been reviewed and edited but there is still possibility for dictation errors due to limitation of software.    Bishop Rios, DO 5/14/2024

## 2024-05-15 NOTE — DISCHARGE INSTRUCTIONS
You were seen in the emergency department for evaluation of a sore throat.    Your symptoms are most likely consistent with a viral infection.  Your labs and CT of your neck were reassuring.    Please follow-up with your primary care physician as well as your infectious disease specialist.  We  represcribed your Bactrim and I wrote a prescription for antiretrovirals.    Take Tylenol and ibuprofen to help with your symptoms.

## 2024-05-15 NOTE — ED TRIAGE NOTES
"Chief Complaint   Patient presents with    Sore Throat     Sore throat, painful swallowing started approx x3 days ago      /78   Pulse (!) 110   Temp 37.1 °C (98.7 °F) (Temporal)   Resp 16   Ht 1.854 m (6' 1\")   Wt 77.6 kg (171 lb 1.2 oz)   SpO2 95%   BMI 22.57 kg/m²     Pt here for above cc  Reports feeling like it is strep throat- s/s started x3 days ago  Pt states he has had strep throat in the past  Throat noted to be red and inflamed but airway not compromised     Pt to [facility], educated on rooming process, notify staff of any changes  "

## 2024-05-16 LAB
ANNOTATION COMMENT IMP: ABNORMAL
CD3 CELLS # BLD: 428 CELLS/UL (ref 570–2400)
CD3+CD4+ CELLS # BLD: 37 CELLS/UL (ref 430–1800)
CD3+CD4+ CELLS/CD3+CD8+ CLL BLD: 0.1 RATIO (ref 0.8–3.9)
CD3+CD8+ CELLS # BLD: 361 CELLS/UL (ref 210–1200)

## 2024-09-26 NOTE — CARE PLAN
Problem: Nutritional:  Goal: Nutrition support tolerated and meeting greater than 85% of estimated needs  Outcome: Met    TF off and diet started     Problem: Nutritional:  Goal: Achieve adequate nutritional intake  Description: Patient will consume 50% of meals  Outcome: Not Met    Regular diet started     
  Problem: Nutritional:  Goal: Nutrition support tolerated and meeting greater than 85% of estimated needs  Outcome: Not Met     
  Problem: Pain - Standard  Goal: Alleviation of pain or a reduction in pain to the patient’s comfort goal  Outcome: Progressing     Problem: Skin Integrity  Goal: Skin integrity is maintained or improved  Outcome: Progressing     Problem: Fall Risk  Goal: Patient will remain free from falls  Outcome: Progressing       The patient is Stable    Shift Goals  Clinical Goals: remain calm  Patient Goals: rest and go home  Family Goals: unable to assess    Progress made toward(s) clinical / shift goals: Patient has remained calm, and vital signs have remained stable.    Patient is not progressing towards the following goals:      
Problem: Pain - Standard  Goal: Alleviation of pain or a reduction in pain to the patient’s comfort goal  Outcome: Progressing     Problem: Skin Integrity  Goal: Skin integrity is maintained or improved  Outcome: Progressing     Problem: Fall Risk  Goal: Patient will remain free from falls  Outcome: Progressing       The patient is Stable - Low risk of patient condition declining or worsening    Shift Goals  Clinical Goals: oral surgery in AM  Patient Goals: rest, go home  Family Goals: unable to assess    Progress made toward(s) clinical / shift goals: Pt pain controlled by prn pain medication. Pt moves frequently in bed. Pt bed locked in lowest position. Pt has call light within reach.      
The patient is Stable - Low risk of patient condition declining or worsening    Shift Goals  Clinical Goals: Attempt to ween off of propofol, cover for detox of alcohol   Patient Goals: unable to assess  Family Goals: unable to assess    Progress made toward(s) clinical / shift goals:    Problem: Knowledge Deficit - Standard  Goal: Patient and family/care givers will demonstrate understanding of plan of care, disease process/condition, diagnostic tests and medications  Outcome: Progressing     Problem: Pain - Standard  Goal: Alleviation of pain or a reduction in pain to the patient’s comfort goal  Outcome: Progressing     Problem: Skin Integrity  Goal: Skin integrity is maintained or improved  Outcome: Progressing           
The patient is Watcher - Medium risk of patient condition declining or worsening    Shift Goals  Clinical Goals: Oral Surgery, Pain management, Access to stay in place  Patient Goals: Pain management, Discharge  Family Goals: unable to assess    Progress made toward(s) clinical / shift goals:  Oral Surgery, Pain management, Access to stay in place      Problem: Knowledge Deficit - Standard  Goal: Patient and family/care givers will demonstrate understanding of plan of care, disease process/condition, diagnostic tests and medications  Outcome: Progressing     Problem: Pain - Standard  Goal: Alleviation of pain or a reduction in pain to the patient’s comfort goal  Outcome: Progressing     Problem: Skin Integrity  Goal: Skin integrity is maintained or improved  Outcome: Progressing     Problem: Fall Risk  Goal: Patient will remain free from falls  Outcome: Progressing       
The patient is Watcher - Medium risk of patient condition declining or worsening    Shift Goals  Clinical Goals: hemodynamic stability   Patient Goals: unable to assess  Family Goals: unable to assess    Progress made toward(s) clinical / shift goals:    Problem: Knowledge Deficit - Standard  Goal: Patient and family/care givers will demonstrate understanding of plan of care, disease process/condition, diagnostic tests and medications  Outcome: Progressing     Problem: Pain - Standard  Goal: Alleviation of pain or a reduction in pain to the patient’s comfort goal  Outcome: Progressing     Problem: Skin Integrity  Goal: Skin integrity is maintained or improved  Outcome: Progressing             
Imaging Studies/Medications

## (undated) DEVICE — GLOVE SZ 7 BIOGEL PI MICRO - PF LF (50PR/BX 4BX/CA)

## (undated) DEVICE — PACK MINOR BASIN - (2EA/CA)

## (undated) DEVICE — GLOVE BIOGEL INDICATOR SZ 7SURGICAL PF LTX - (50/BX 4BX/CA)

## (undated) DEVICE — SET EXTENSION WITH 2 PORTS (48EA/CA) ***PART #2C8610 IS A SUBSTITUTE*****

## (undated) DEVICE — GLOVE BIOGEL SZ 8 SURGICAL PF LTX - (50PR/BX 4BX/CA)

## (undated) DEVICE — GOWN WARMING STANDARD FLEX - (30/CA)

## (undated) DEVICE — SODIUM CHL IRRIGATION 0.9% 1000ML (12EA/CA)

## (undated) DEVICE — NEPTUNE 4 PORT MANIFOLD - (20/PK)

## (undated) DEVICE — BLADE SURGICAL #15 - (50/BX 3BX/CA)

## (undated) DEVICE — GLOVE SZ 6.5 BIOGEL PI MICRO - PF LF (50PR/BX)

## (undated) DEVICE — DRAIN PENROSE STERILE 1/4 X - 18 IN  (25EA/BX)

## (undated) DEVICE — SLEEVE, VASO, THIGH, MED

## (undated) DEVICE — ELECTRODE DUAL RETURN W/ CORD - (50/PK)

## (undated) DEVICE — HEAD HOLDER JUNIOR/ADULT

## (undated) DEVICE — GOWN SURGEONS X-LARGE - DISP. (30/CA)

## (undated) DEVICE — TUBING CLEARLINK DUO-VENT - C-FLO (48EA/CA)

## (undated) DEVICE — SPONGE PEANUT - (5/PK 50PK/CA)

## (undated) DEVICE — DRAPE LARGE 3 QUARTER - (20/CA)

## (undated) DEVICE — SUTURE GENERAL

## (undated) DEVICE — TOWEL STOP TIMEOUT SAFETY FLAG (40EA/CA)

## (undated) DEVICE — SUCTION INSTRUMENT YANKAUER BULBOUS TIP W/O VENT (50EA/CA)

## (undated) DEVICE — CONTAINER SPECIMEN BAG OR - STERILE 4 OZ W/LID (100EA/CA)

## (undated) DEVICE — BAG SPONGE COUNT 10.25 X 32 - BLUE (250/CA)

## (undated) DEVICE — CANISTER SUCTION 3000ML MECHANICAL FILTER AUTO SHUTOFF MEDI-VAC NONSTERILE LF DISP  (40EA/CA)

## (undated) DEVICE — SUTURE 3-0 CHROMIC GUT FS-2 27 (36PK/BX)"

## (undated) DEVICE — PROTECTOR ULNA NERVE - (36PR/CA)

## (undated) DEVICE — MASK ANESTHESIA ADULT  - (100/CA)

## (undated) DEVICE — ELECTRODE 850 FOAM ADHESIVE - HYDROGEL RADIOTRNSPRNT (50/PK)

## (undated) DEVICE — SET LEADWIRE 5 LEAD BEDSIDE DISPOSABLE ECG (1SET OF 5/EA)

## (undated) DEVICE — SENSOR SPO2 NEO LNCS ADHESIVE (20/BX) SEE USER NOTES

## (undated) DEVICE — DRAPE SURGICAL U 77X120 - (10/CA)

## (undated) DEVICE — LACTATED RINGERS INJ 1000 ML - (14EA/CA 60CA/PF)

## (undated) DEVICE — GLOVE BIOGEL INDICATOR SZ 8 SURGICAL PF LTX - (50/BX 4BX/CA)

## (undated) DEVICE — KIT ANESTHESIA W/CIRCUIT & 3/LT BAG W/FILTER (20EA/CA)